# Patient Record
Sex: FEMALE | Race: BLACK OR AFRICAN AMERICAN | HISPANIC OR LATINO | Employment: FULL TIME | URBAN - METROPOLITAN AREA
[De-identification: names, ages, dates, MRNs, and addresses within clinical notes are randomized per-mention and may not be internally consistent; named-entity substitution may affect disease eponyms.]

---

## 2017-05-19 ENCOUNTER — OFFICE VISIT (OUTPATIENT)
Dept: URGENT CARE | Facility: URGENT CARE | Age: 37
End: 2017-05-19
Payer: COMMERCIAL

## 2017-05-19 VITALS
TEMPERATURE: 98.7 F | SYSTOLIC BLOOD PRESSURE: 130 MMHG | WEIGHT: 275 LBS | BODY MASS INDEX: 45.07 KG/M2 | DIASTOLIC BLOOD PRESSURE: 80 MMHG | HEART RATE: 73 BPM

## 2017-05-19 DIAGNOSIS — L21.0 DANDRUFF, ADULT: Primary | ICD-10-CM

## 2017-05-19 PROCEDURE — 99203 OFFICE O/P NEW LOW 30 MIN: CPT | Performed by: NURSE PRACTITIONER

## 2017-05-19 RX ORDER — LABETALOL 200 MG/1
TABLET, FILM COATED ORAL
Refills: 3 | COMMUNITY
Start: 2017-04-02 | End: 2018-02-08

## 2017-05-19 NOTE — MR AVS SNAPSHOT
"              After Visit Summary   2017    Tiffany Sanz    MRN: [id number]           Patient Information     Date Of Birth          1980        Visit Information        Provider Department      2017 12:45 PM Kate Mcneil NP [facility]        Today's Diagnoses     Dandruff, adult    -  1       Follow-ups after your visit        Follow-up notes from your care team     Return if symptoms worsen or fail to improve.      Who to contact     If you have questions or need follow up information about today's clinic visit or your schedule please contact [facility] directly at [phone].  Normal or non-critical lab and imaging results will be communicated to you by Techcafe.iohart, letter or phone within 4 business days after the clinic has received the results. If you do not hear from us within 7 days, please contact the clinic through Techcafe.iohart or phone. If you have a critical or abnormal lab result, we will notify you by phone as soon as possible.  Submit refill requests through oboxo or call your pharmacy and they will forward the refill request to us. Please allow 3 business days for your refill to be completed.          Additional Information About Your Visit        MyChart Information     oboxo lets you send messages to your doctor, view your test results, renew your prescriptions, schedule appointments and more. To sign up, go to [email or web address] . Click on \"Log in\" on the left side of the screen, which will take you to the Welcome page. Then click on \"Sign up Now\" on the right side of the page.     You will be asked to enter the access code listed below, as well as some personal information. Please follow the directions to create your username and password.     Your access code is: [email or web address]  Expires: 2017  1:54 PM     Your access code will  in 90 days. If you need help or a new code, please call your [facility] or [phone].      "   Care EveryWhere ID     This is your Care EveryWhere ID. This could be used by other organizations to access your [place] medical records  [id number]        Your Vitals Were     Pulse Temperature Breastfeeding? BMI (Body Mass Index)          73 98.7  F (37.1  C) (Oral) Yes 45.07 kg/m2         Blood Pressure from Last 3 Encounters:   05/19/17 130/80   05/11/10 120/70   03/03/10 122/60    Weight from Last 3 Encounters:   05/19/17 275 lb (124.7 kg)   05/11/10 224 lb (101.6 kg)   03/03/10 216 lb (98 kg)              Today, you had the following     No orders found for display         Today's Medication Changes          These changes are accurate as of: 5/19/17  1:54 PM.  If you have any questions, ask your nurse or doctor.               Start taking these medicines.        Dose/Directions    pyrithione zinc 1 % Sham   Commonly known as:  AVEENO NOURISH+DANDRUFF CONT   Used for:  Dandruff, adult   Started by:  Kate Mcneil NP        Dose:  1 applicator   Apply 1 mL topically daily as needed for irritation   Quantity:  207 mL   Refills:  0            Where to get your medicines      These medications were sent to [facility]/pharmacy #[id number] - [place], [place] - [address]  [address] [address]     Phone:  [phone]     pyrithione zinc 1 % Sham                Primary Care Provider    None Specified       No primary provider on file.        Thank you!     Thank you for choosing [facility]  for your care. Our goal is always to provide you with excellent care. Hearing back from our patients is one way we can continue to improve our services. Please take a few minutes to complete the written survey that you may receive in the mail after your visit with us. Thank you!             Your Updated Medication List - Protect others around you: Learn how to safely use, store and throw away your medicines at www.disposemymeds.org.          This list is accurate as of: 5/19/17  1:54 PM.   Always use your most recent med list.                   Brand Name Dispense Instructions for use    labetalol 200 MG tablet    NORMODYNE     TAKE 1 TABLET BY MOUTH TWICE A DAY       pyrithione zinc 1 % Sham    AVEENO NOURISH+DANDRUFF CONT    207 mL    Apply 1 mL topically daily as needed for irritation

## 2017-05-19 NOTE — PROGRESS NOTES
SUBJECTIVE:                                                    Tiffany Sanz is a 36 year old female who presents to clinic today for the following health issues:      Rash      Duration: 3 days    Description  Location: scalp   Itching: mild    Intensity:  moderate    Accompanying signs and symptoms: None    History (similar episodes/previous evaluation): None    Precipitating or alleviating factors:  New exposures:  None  Recent travel: was in [place] recently      Therapies tried and outcome: none            Allergies   Allergen Reactions     Nkda [No Known Drug Allergies]        Past Medical History:   Diagnosis Date     NO ACTIVE PROBLEMS          No current outpatient prescriptions on file prior to visit.  No current facility-administered medications on file prior to visit.     Social History   Substance Use Topics     Smoking status: Never Smoker     Smokeless tobacco: Not on file      Comment: Roommate smokes     Alcohol use Yes       ROS:  General: negative for fever  SKIN: + as above      Physcial Exam:  /80 (BP Location: Left arm, Patient Position: Chair, Cuff Size: Adult Large)  Pulse 73  Temp 98.7  F (37.1  C) (Oral)  Wt 275 lb (124.7 kg)  Breastfeeding? Yes  BMI 45.07 kg/m2    GENERAL: alert, no acute distress  EYES: conjunctival clear  RESP: Regular breathing rate  NEURO: awake .  SKIN: patchy 3 rounded scaly areas on the scalp size of a dime, with particles of dandruff.    ASSESSMENT:    ICD-10-CM    1. Dandruff, adult L21.0 pyrithione zinc (AVEENO NOURISH+DANDRUFF CONT) 1 % SHAM       PLAN:  See today's orders.  Follow-up with primary clinic if not improving.  Advised about symptoms which might herald more serious problems.    Kate Mcneil  FNP-BC  Family Nurse Practitoner

## 2017-05-19 NOTE — NURSING NOTE
"Chief Complaint   Patient presents with     Derm Problem     Pt c/o dry skin concern.       Initial /80 (BP Location: Left arm, Patient Position: Chair, Cuff Size: Adult Large)  Pulse 73  Temp 98.7  F (37.1  C) (Oral)  Wt 275 lb (124.7 kg)  Breastfeeding? Yes  BMI 45.07 kg/m2 Estimated body mass index is 45.07 kg/(m^2) as calculated from the following:    Height as of 3/3/10: 5' 5.5\" (1.664 m).    Weight as of this encounter: 275 lb (124.7 kg).  Medication Reconciliation: complete     Ofelia Kaiser CMA (AAMA)      "

## 2017-06-07 ENCOUNTER — OFFICE VISIT - HEALTHEAST (OUTPATIENT)
Dept: FAMILY MEDICINE | Facility: CLINIC | Age: 37
End: 2017-06-07

## 2017-06-07 DIAGNOSIS — R87.610 ATYPICAL SQUAMOUS CELLS OF UNDETERMINED SIGNIFICANCE (ASCUS) ON PAPANICOLAOU SMEAR OF CERVIX: ICD-10-CM

## 2017-06-07 DIAGNOSIS — Z30.432 ENCOUNTER FOR IUD REMOVAL: ICD-10-CM

## 2017-06-07 LAB
HPV ABSTRACT: NORMAL
PAP-ABSTRACT: NORMAL

## 2017-06-07 ASSESSMENT — MIFFLIN-ST. JEOR: SCORE: 1936.62

## 2017-06-10 LAB
HPV INTERPRETATION - HISTORICAL: NORMAL
HPV INTERPRETER - HISTORICAL: NORMAL

## 2017-06-14 LAB
BKR LAB AP ABNORMAL BLEEDING: NO
BKR LAB AP BIRTH CONTROL/HORMONES: NORMAL
BKR LAB AP CERVICAL APPEARANCE: NORMAL
BKR LAB AP GYN ADEQUACY: NORMAL
BKR LAB AP GYN INTERPRETATION: NORMAL
BKR LAB AP GYN OTHER FINDINGS: NORMAL
BKR LAB AP HPV REFLEX: NORMAL
BKR LAB AP LMP: NORMAL
BKR LAB AP PATIENT STATUS: NORMAL
BKR LAB AP PREVIOUS ABNORMAL: NORMAL
BKR LAB AP PREVIOUS NORMAL: NORMAL
HIGH RISK?: NO
PATH REPORT.COMMENTS IMP SPEC: NORMAL
RESULT FLAG (HE HISTORICAL CONVERSION): NORMAL

## 2017-06-15 ENCOUNTER — COMMUNICATION - HEALTHEAST (OUTPATIENT)
Dept: HEALTH INFORMATION MANAGEMENT | Facility: CLINIC | Age: 37
End: 2017-06-15

## 2017-06-16 ENCOUNTER — COMMUNICATION - HEALTHEAST (OUTPATIENT)
Dept: FAMILY MEDICINE | Facility: CLINIC | Age: 37
End: 2017-06-16

## 2017-06-19 ENCOUNTER — OFFICE VISIT - HEALTHEAST (OUTPATIENT)
Dept: FAMILY MEDICINE | Facility: CLINIC | Age: 37
End: 2017-06-19

## 2017-06-19 DIAGNOSIS — R35.0 URINARY FREQUENCY: ICD-10-CM

## 2017-06-19 DIAGNOSIS — Z86.32 HISTORY OF GESTATIONAL DIABETES: ICD-10-CM

## 2017-06-19 DIAGNOSIS — E66.9 OBESITY, UNSPECIFIED: ICD-10-CM

## 2017-06-19 LAB — HBA1C MFR BLD: 5.7 % (ref 3.5–6)

## 2017-06-19 ASSESSMENT — MIFFLIN-ST. JEOR: SCORE: 1923.01

## 2017-07-12 ENCOUNTER — OFFICE VISIT - HEALTHEAST (OUTPATIENT)
Dept: FAMILY MEDICINE | Facility: CLINIC | Age: 37
End: 2017-07-12

## 2017-07-12 DIAGNOSIS — E66.9 OBESITY, UNSPECIFIED: ICD-10-CM

## 2017-07-12 DIAGNOSIS — M25.552 HIP PAIN, ACUTE, LEFT: ICD-10-CM

## 2017-07-12 DIAGNOSIS — N91.2 AMENORRHEA: ICD-10-CM

## 2017-07-12 DIAGNOSIS — M70.62 TROCHANTERIC BURSITIS OF LEFT HIP: ICD-10-CM

## 2017-07-12 ASSESSMENT — MIFFLIN-ST. JEOR: SCORE: 1947.5

## 2017-07-13 ENCOUNTER — COMMUNICATION - HEALTHEAST (OUTPATIENT)
Dept: SURGERY | Facility: CLINIC | Age: 37
End: 2017-07-13

## 2017-07-17 ENCOUNTER — COMMUNICATION - HEALTHEAST (OUTPATIENT)
Dept: SURGERY | Facility: CLINIC | Age: 37
End: 2017-07-17

## 2017-08-31 ENCOUNTER — AMBULATORY - HEALTHEAST (OUTPATIENT)
Dept: SURGERY | Facility: CLINIC | Age: 37
End: 2017-08-31

## 2017-08-31 ENCOUNTER — OFFICE VISIT - HEALTHEAST (OUTPATIENT)
Dept: SURGERY | Facility: CLINIC | Age: 37
End: 2017-08-31

## 2017-08-31 DIAGNOSIS — R06.83 SNORING: ICD-10-CM

## 2017-08-31 DIAGNOSIS — R60.0 LOWER LEG EDEMA: ICD-10-CM

## 2017-08-31 DIAGNOSIS — R20.2 PARESTHESIA OF BOTH FEET: ICD-10-CM

## 2017-08-31 DIAGNOSIS — N92.0 MENORRHAGIA: ICD-10-CM

## 2017-08-31 DIAGNOSIS — E66.01 MORBID OBESITY WITH BMI OF 40.0-44.9, ADULT (H): ICD-10-CM

## 2017-08-31 DIAGNOSIS — D64.9 ANEMIA: ICD-10-CM

## 2017-08-31 DIAGNOSIS — E78.5 DYSLIPIDEMIA: ICD-10-CM

## 2017-08-31 ASSESSMENT — MIFFLIN-ST. JEOR: SCORE: 1923.01

## 2017-09-05 ENCOUNTER — AMBULATORY - HEALTHEAST (OUTPATIENT)
Dept: LAB | Facility: CLINIC | Age: 37
End: 2017-09-05

## 2017-09-05 DIAGNOSIS — E66.01 MORBID OBESITY WITH BMI OF 40.0-44.9, ADULT (H): ICD-10-CM

## 2017-09-05 DIAGNOSIS — N92.0 MENORRHAGIA: ICD-10-CM

## 2017-09-05 DIAGNOSIS — R20.2 PARESTHESIA OF BOTH FEET: ICD-10-CM

## 2017-09-08 ENCOUNTER — COMMUNICATION - HEALTHEAST (OUTPATIENT)
Dept: SURGERY | Facility: CLINIC | Age: 37
End: 2017-09-08

## 2017-10-03 ENCOUNTER — OFFICE VISIT - HEALTHEAST (OUTPATIENT)
Dept: FAMILY MEDICINE | Facility: CLINIC | Age: 37
End: 2017-10-03

## 2017-10-03 DIAGNOSIS — Z11.3 SCREEN FOR STD (SEXUALLY TRANSMITTED DISEASE): ICD-10-CM

## 2017-10-03 DIAGNOSIS — N89.8 VAGINAL DISCHARGE: ICD-10-CM

## 2017-10-03 DIAGNOSIS — B96.89 BACTERIAL VAGINOSIS: ICD-10-CM

## 2017-10-03 DIAGNOSIS — N76.0 BACTERIAL VAGINOSIS: ICD-10-CM

## 2017-10-03 LAB — HIV 1+2 AB+HIV1 P24 AG SERPL QL IA: NEGATIVE

## 2017-10-03 ASSESSMENT — MIFFLIN-ST. JEOR: SCORE: 1852.7

## 2017-10-04 LAB — SYPHILIS RPR SCREEN - HISTORICAL: NORMAL

## 2017-10-31 ENCOUNTER — COMMUNICATION - HEALTHEAST (OUTPATIENT)
Dept: SURGERY | Facility: CLINIC | Age: 37
End: 2017-10-31

## 2017-11-28 ENCOUNTER — OFFICE VISIT - HEALTHEAST (OUTPATIENT)
Dept: FAMILY MEDICINE | Facility: CLINIC | Age: 37
End: 2017-11-28

## 2017-11-28 DIAGNOSIS — Z11.3 SCREEN FOR STD (SEXUALLY TRANSMITTED DISEASE): ICD-10-CM

## 2017-11-28 DIAGNOSIS — E78.5 DYSLIPIDEMIA: ICD-10-CM

## 2017-11-28 DIAGNOSIS — Z00.00 WELL ADULT EXAM: ICD-10-CM

## 2017-11-28 DIAGNOSIS — R11.0 NAUSEA: ICD-10-CM

## 2017-11-28 DIAGNOSIS — D64.9 ANEMIA: ICD-10-CM

## 2017-11-28 DIAGNOSIS — N89.8 VAGINAL DISCHARGE: ICD-10-CM

## 2017-11-28 DIAGNOSIS — E66.01 MORBID OBESITY WITH BMI OF 40.0-44.9, ADULT (H): ICD-10-CM

## 2017-11-28 DIAGNOSIS — L65.9 HAIR LOSS: ICD-10-CM

## 2017-11-28 DIAGNOSIS — R73.03 PREDIABETES: ICD-10-CM

## 2017-11-28 DIAGNOSIS — R35.0 URINARY FREQUENCY: ICD-10-CM

## 2017-11-28 LAB
CHOLEST SERPL-MCNC: 217 MG/DL
FASTING STATUS PATIENT QL REPORTED: NO
HBA1C MFR BLD: 5.6 % (ref 3.5–6)
HDLC SERPL-MCNC: 53 MG/DL
HIV 1+2 AB+HIV1 P24 AG SERPL QL IA: NEGATIVE
LDLC SERPL CALC-MCNC: 141 MG/DL
TRIGL SERPL-MCNC: 116 MG/DL

## 2017-11-28 ASSESSMENT — MIFFLIN-ST. JEOR: SCORE: 1911.61

## 2017-11-29 LAB — SYPHILIS RPR SCREEN - HISTORICAL: NORMAL

## 2018-01-02 ENCOUNTER — OFFICE VISIT - HEALTHEAST (OUTPATIENT)
Dept: FAMILY MEDICINE | Facility: CLINIC | Age: 38
End: 2018-01-02

## 2018-01-02 DIAGNOSIS — R11.0 NAUSEA: ICD-10-CM

## 2018-01-02 DIAGNOSIS — L65.9 HAIR LOSS: ICD-10-CM

## 2018-01-02 DIAGNOSIS — D64.9 ANEMIA: ICD-10-CM

## 2018-01-02 DIAGNOSIS — R19.8 ANAL DISCHARGE: ICD-10-CM

## 2018-01-02 DIAGNOSIS — E66.01 MORBID OBESITY WITH BMI OF 40.0-44.9, ADULT (H): ICD-10-CM

## 2018-01-02 ASSESSMENT — MIFFLIN-ST. JEOR: SCORE: 1929.47

## 2018-01-05 LAB
MISCELLANEOUS TEST DEPT. - HE HISTORICAL: NORMAL
PERFORMING LAB: NORMAL
SPECIMEN STATUS: NORMAL
TEST NAME: NORMAL

## 2018-02-01 ENCOUNTER — OFFICE VISIT - HEALTHEAST (OUTPATIENT)
Dept: SURGERY | Facility: CLINIC | Age: 38
End: 2018-02-01

## 2018-02-01 DIAGNOSIS — D50.9 IRON DEFICIENCY ANEMIA: ICD-10-CM

## 2018-02-01 DIAGNOSIS — E66.01 MORBID OBESITY WITH BMI OF 40.0-44.9, ADULT (H): ICD-10-CM

## 2018-02-01 DIAGNOSIS — N92.4 EXCESSIVE BLEEDING IN PREMENOPAUSAL PERIOD: ICD-10-CM

## 2018-02-01 ASSESSMENT — MIFFLIN-ST. JEOR: SCORE: 1937.75

## 2018-02-08 ENCOUNTER — OFFICE VISIT (OUTPATIENT)
Dept: URGENT CARE | Facility: URGENT CARE | Age: 38
End: 2018-02-08
Payer: COMMERCIAL

## 2018-02-08 VITALS
TEMPERATURE: 98.8 F | DIASTOLIC BLOOD PRESSURE: 86 MMHG | BODY MASS INDEX: 44.57 KG/M2 | WEIGHT: 272 LBS | HEART RATE: 83 BPM | SYSTOLIC BLOOD PRESSURE: 131 MMHG | OXYGEN SATURATION: 100 %

## 2018-02-08 DIAGNOSIS — R07.0 THROAT PAIN: ICD-10-CM

## 2018-02-08 DIAGNOSIS — J06.9 VIRAL URI WITH COUGH: Primary | ICD-10-CM

## 2018-02-08 LAB
DEPRECATED S PYO AG THROAT QL EIA: NORMAL
SPECIMEN SOURCE: NORMAL

## 2018-02-08 PROCEDURE — 99213 OFFICE O/P EST LOW 20 MIN: CPT | Performed by: PHYSICIAN ASSISTANT

## 2018-02-08 PROCEDURE — 87880 STREP A ASSAY W/OPTIC: CPT | Performed by: PHYSICIAN ASSISTANT

## 2018-02-08 PROCEDURE — 87081 CULTURE SCREEN ONLY: CPT | Performed by: PHYSICIAN ASSISTANT

## 2018-02-08 RX ORDER — METRONIDAZOLE 500 MG/1
500 TABLET ORAL
COMMUNITY
Start: 2018-01-25 | End: 2019-02-26

## 2018-02-08 ASSESSMENT — ENCOUNTER SYMPTOMS
PSYCHIATRIC NEGATIVE: 1
CARDIOVASCULAR NEGATIVE: 1
EYES NEGATIVE: 1
NEUROLOGICAL NEGATIVE: 1
GASTROINTESTINAL NEGATIVE: 1
MUSCULOSKELETAL NEGATIVE: 1

## 2018-02-08 NOTE — MR AVS SNAPSHOT
"              After Visit Summary   2018    Tiffany Sanz    MRN: [id number]           Patient Information     Date Of Birth          1980        Visit Information        Provider Department      2018 1:45 PM Berna Hendricks PA-C [facility]        Today's Diagnoses     Viral URI with cough    -  1    Throat pain           Follow-ups after your visit        Who to contact     If you have questions or need follow up information about today's clinic visit or your schedule please contact [facility] directly at [phone].  Normal or non-critical lab and imaging results will be communicated to you by PayrollHerohart, letter or phone within 4 business days after the clinic has received the results. If you do not hear from us within 7 days, please contact the clinic through PayrollHerohart or phone. If you have a critical or abnormal lab result, we will notify you by phone as soon as possible.  Submit refill requests through Wallop or call your pharmacy and they will forward the refill request to us. Please allow 3 business days for your refill to be completed.          Additional Information About Your Visit        MyChart Information     Wallop lets you send messages to your doctor, view your test results, renew your prescriptions, schedule appointments and more. To sign up, go to [email or web address] . Click on \"Log in\" on the left side of the screen, which will take you to the Welcome page. Then click on \"Sign up Now\" on the right side of the page.     You will be asked to enter the access code listed below, as well as some personal information. Please follow the directions to create your username and password.     Your access code is: [email or web address]  Expires: 2018  3:56 PM     Your access code will  in 90 days. If you need help or a new code, please call your [facility] or [phone].        Care EveryWhere ID     This is your Care EveryWhere ID. " This could be used by other organizations to access your [place] medical records  [id number]        Your Vitals Were     Pulse Temperature Pulse Oximetry Breastfeeding? BMI (Body Mass Index)       83 98.8  F (37.1  C) (Oral) 100% No 44.57 kg/m2        Blood Pressure from Last 3 Encounters:   02/08/18 131/86   05/19/17 130/80   05/11/10 120/70    Weight from Last 3 Encounters:   02/08/18 272 lb (123.4 kg)   05/19/17 275 lb (124.7 kg)   05/11/10 224 lb (101.6 kg)              We Performed the Following     Beta strep group A culture     Rapid strep screen        Primary Care Provider Fax #    Physician No Ref-Primary [phone]       No address on file        Equal Access to Services     YUNG ARREOLA : Vignesh Trammell, waaxda luqadaha, qaybta kaalmada valentinoyajulio, talya ching . So [facility] [phone].    ATENCIÓN: Si habla español, tiene a duke disposición servicios gratuitos de asistencia lingüística. Llame al [phone].    We comply with applicable federal civil rights laws and [place] laws. We do not discriminate on the basis of race, color, national origin, age, disability, sex, sexual orientation, or gender identity.            Thank you!     [facility] Our goal is always to provide you with excellent care. Hearing back from our patients is one way we can continue to improve our services. Please take a few minutes to complete the written survey that you may receive in the mail after your visit with us. Thank you!             Your Updated Medication List - Protect others around you: Learn how to safely use, store and throw away your medicines at www.disposemymeds.org.          This list is accurate as of 2/8/18  3:56 PM.  Always use your most recent med list.                   Brand Name Dispense Instructions for use Diagnosis    IRON SUPPLEMENT PO           metroNIDAZOLE 500 MG tablet    FLAGYL     Take 500  mg by mouth        PRENATAL MULTIVIT-IRON PO

## 2018-02-08 NOTE — PROGRESS NOTES
SUBJECTIVE:   Tiffany Sanz is a 37 year old female presenting with a chief complaint of   Chief Complaint   Patient presents with     Throat Pain     Pt c/o throat pain and cough since yesterday.    .    Onset of symptoms was 1 day(s) ago.  Course of illness is worsening.    Severity moderate  Current and Associated symptoms: chills, throat pain, body aches, cough   Treatment measures tried include Fluids and Rest, cough drops.  Predisposing factors include None.    This started feeling ill two days ago  She had a sore throat, body aches, chills, not sure about a fever  Cough - productive with yellow sputum and throat hurts with coughing   No shortness of breath or wheezing  No history of asthma, non smoker    Review of Systems   Constitutional:        As in HPI   HENT:        As in HPI   Eyes: Negative.    Respiratory:        As in HPI   Cardiovascular: Negative.    Gastrointestinal: Negative.    Genitourinary: Negative.    Musculoskeletal: Negative.    Skin: Negative.    Neurological: Negative.    Psychiatric/Behavioral: Negative.          Past Medical History:   Diagnosis Date     NO ACTIVE PROBLEMS      Current Outpatient Prescriptions   Medication Sig Dispense Refill     metroNIDAZOLE (FLAGYL) 500 MG tablet Take 500 mg by mouth       Prenatal Vit-Fe Sulfate-FA (PRENATAL MULTIVIT-IRON PO)        Ferrous Sulfate (IRON SUPPLEMENT PO)        Social History   Substance Use Topics     Smoking status: Never Smoker     Smokeless tobacco: Never Used      Comment: Roommate smokes     Alcohol use Yes       OBJECTIVE  /86 (BP Location: Left arm, Patient Position: Chair, Cuff Size: Adult Large)  Pulse 83  Temp 98.8  F (37.1  C) (Oral)  Wt 272 lb (123.4 kg)  SpO2 100%  Breastfeeding? No  BMI 44.57 kg/m2    Physical Exam   Constitutional: She is oriented to person, place, and time and well-developed, well-nourished, and in no distress.   HENT:   Head: Normocephalic and atraumatic.   Right Ear: Tympanic membrane  and ear canal normal.   Left Ear: Tympanic membrane and ear canal normal.   Nose: Nose normal.   Mouth/Throat: Uvula is midline and mucous membranes are normal. Oropharyngeal exudate, posterior oropharyngeal edema and posterior oropharyngeal erythema present.   Eyes: Conjunctivae and EOM are normal. Pupils are equal, round, and reactive to light.   Neck: Normal range of motion. Neck supple.   Cardiovascular: Normal rate, regular rhythm and normal heart sounds.    Pulmonary/Chest: Effort normal and breath sounds normal.   Neurological: She is alert and oriented to person, place, and time. Gait normal.   Skin: Skin is warm and dry.   Psychiatric: Mood and affect normal.       Labs:  Results for orders placed or performed in visit on 02/08/18 (from the past 24 hour(s))   Rapid strep screen   Result Value Ref Range    Specimen Description Throat     Rapid Strep A Screen       NEGATIVE: No Group A streptococcal antigen detected by immunoassay, await culture report.           ASSESSMENT:      ICD-10-CM    1. Viral URI with cough J06.9     B97.89    2. Throat pain R07.0 Rapid strep screen     Beta strep group A culture        PLAN:    URI Adult:  Tylenol, Ibuprofen, Fluids and Rest    Followup:    If not improving or if condition worsens, follow up with your Primary Care Provider    There are no Patient Instructions on file for this visit.    Berna eHndricks PA-C

## 2018-02-08 NOTE — LETTER
.            [facility]  [address]  [facility] [address]  Phone: [phone]    02/10/18    Tiffany ROACH Yvon  [address] [place]   [facility] [address]      To whom it may concern:     The results of your recent lab results were normal. Enclosed are a copy of the results. Please call us with any questions/concerns regarding your results. Thank you for choosing [facility]. Have a great day!  Results for orders placed or performed in visit on 02/08/18   Rapid strep screen   Result Value Ref Range    Specimen Description Throat     Rapid Strep A Screen       NEGATIVE: No Group A streptococcal antigen detected by immunoassay, await culture report.   Beta strep group A culture   Result Value Ref Range    Specimen Description Throat     Culture Micro No beta hemolytic Streptococcus Group A isolated          Sincerely,      Berna Hendricks PA-C, PAOlvinC

## 2018-02-08 NOTE — NURSING NOTE
"Chief Complaint   Patient presents with     Throat Pain     Pt c/o throat pain and cough since yesterday.        Initial /86 (BP Location: Left arm, Patient Position: Chair, Cuff Size: Adult Large)  Pulse 83  Temp 98.8  F (37.1  C) (Oral)  Wt 272 lb (123.4 kg)  SpO2 100%  Breastfeeding? No  BMI 44.57 kg/m2 Estimated body mass index is 44.57 kg/(m^2) as calculated from the following:    Height as of 3/3/10: 5' 5.5\" (1.664 m).    Weight as of this encounter: 272 lb (123.4 kg).  Medication Reconciliation: complete     Ofelia Kaiser CMA (AAMA)      "

## 2018-02-09 LAB
BACTERIA SPEC CULT: NORMAL
SPECIMEN SOURCE: NORMAL

## 2018-02-21 ENCOUNTER — RECORDS - HEALTHEAST (OUTPATIENT)
Dept: ADMINISTRATIVE | Facility: OTHER | Age: 38
End: 2018-02-21

## 2018-03-02 ENCOUNTER — COMMUNICATION - HEALTHEAST (OUTPATIENT)
Dept: SURGERY | Facility: CLINIC | Age: 38
End: 2018-03-02

## 2018-08-19 ENCOUNTER — TRANSFERRED RECORDS (OUTPATIENT)
Dept: HEALTH INFORMATION MANAGEMENT | Facility: CLINIC | Age: 38
End: 2018-08-19

## 2018-08-19 LAB
C TRACH DNA SPEC QL PROBE+SIG AMP: NEGATIVE
N GONORRHOEA DNA SPEC QL PROBE+SIG AMP: NEGATIVE
SPECIMEN DESCRIP: NORMAL
SPECIMEN DESCRIPTION: NORMAL

## 2018-09-06 ENCOUNTER — OFFICE VISIT (OUTPATIENT)
Dept: FAMILY MEDICINE | Facility: CLINIC | Age: 38
End: 2018-09-06
Payer: COMMERCIAL

## 2018-09-06 VITALS
BODY MASS INDEX: 46.67 KG/M2 | RESPIRATION RATE: 18 BRPM | WEIGHT: 290.4 LBS | TEMPERATURE: 98.3 F | HEART RATE: 90 BPM | HEIGHT: 66 IN | OXYGEN SATURATION: 100 % | DIASTOLIC BLOOD PRESSURE: 82 MMHG | SYSTOLIC BLOOD PRESSURE: 140 MMHG

## 2018-09-06 DIAGNOSIS — E66.01 MORBID OBESITY (H): ICD-10-CM

## 2018-09-06 DIAGNOSIS — R21 RASH: Primary | ICD-10-CM

## 2018-09-06 PROCEDURE — 99213 OFFICE O/P EST LOW 20 MIN: CPT | Performed by: PHYSICIAN ASSISTANT

## 2018-09-06 RX ORDER — HYDROCORTISONE 2.5 %
CREAM (GRAM) TOPICAL
Qty: 30 G | Refills: 0 | Status: SHIPPED | OUTPATIENT
Start: 2018-09-06 | End: 2019-07-02

## 2018-09-06 ASSESSMENT — PAIN SCALES - GENERAL: PAINLEVEL: NO PAIN (0)

## 2018-09-06 NOTE — MR AVS SNAPSHOT
After Visit Summary   9/6/2018    Tiffany Sanz    MRN: [id number]           Patient Information     Date Of Birth          1980        Visit Information        Provider Department      9/6/2018 11:20 AM Fernandez Bennett PA [facility]        Today's Diagnoses     Rash    -  1    Morbid obesity (H)          Care Instructions        Dermatitis (Non-Specific)  Dermatitis is an inflammation of the skin. The exact cause of your rash is not certain. However, this rash does not appear to be an infection or contagious illness. Taking care of the rash at home should help relieve your symptoms.  Home Care:    Keep the areas of rash clean by washing it daily. This also helps to keep the skin moist.    Use a neutral pH soap such as Dove or Lever 2000.    Apply a moisturizing lotion after bathing to prevent dry skin.     Avoid skin irritants (wool or silk clothing, grease, oils, some medicines, harsh soaps, and detergents). Wear absorbent, soft fabrics next to the skin rather than rough or scratchy materials.    Unless another medicine was prescribed, you may use Hydrocortisone cream (which you can get without a prescription) to reduce the inflammation.  Follow Up:  Make an appointment with your doctor in the next 1 to 2 weeks if your symptoms do not improve with the above measures.  Get Prompt Medical Attention  if any of the following occur:    Increasing area of redness or pain in the skin    Yellow crusts or drainage from the rash    Joint pain    New rash that appears in other areas of the body    Fever of 100.4 F (38 C) or higher, or as directed by your healthcare provider    [id number] [address], [place] [address]. All rights reserved. This information is not intended as a substitute for professional medical care. Always follow your healthcare professional's instructions.                  Follow-ups after your visit        Follow-up notes from  "your care team     Return if symptoms worsen or fail to improve.      Your next 10 appointments already scheduled     Sep 06, 2018 11:20 AM CDT   [facility]    [address] [place] [id number] [phone]           Bring a current list of meds and any records pertaining to this visit. For Physicals, please bring immunization records and any forms needing to be filled out. Please arrive 10 minutes early to complete paperwork.              Who to contact     If you have questions or need follow up information about today's clinic visit or your schedule please contact [facility] directly at [phone].  Normal or non-critical lab and imaging results will be communicated to you by MyChart, letter or phone within 4 business days after the clinic has received the results. If you do not hear from us within 7 days, please contact the clinic through MyChart or phone. If you have a critical or abnormal lab result, we will notify you by phone as soon as possible.  Submit refill requests through Digital Marketing Solutions or call your pharmacy and they will forward the refill request to us. Please allow 3 business days for your refill to be completed.          Additional Information About Your Visit        Digital Marketing Solutions Information     Digital Marketing Solutions lets you send messages to your doctor, view your test results, renew your prescriptions, schedule appointments and more. To sign up, go to [email or web address] . Click on \"Log in\" on the left side of the screen, which will take you to the Welcome page. Then click on \"Sign up Now\" on the right side of the page.     You will be asked to enter the access code listed below, as well as some personal information. Please follow the directions to create your username and password.     Your access code is: [email or web address]  Expires: 12/5/2018 10:53 AM     Your access code will " " in 90 days. If you need help or a new code, please call your [place] clinic or [phone].        Care EveryWhere ID     This is your Care EveryWhere ID. This could be used by other organizations to access your [place] medical records  [id number]        Your Vitals Were     Pulse Temperature Respirations Height Last Period Pulse Oximetry    90 98.3  F (36.8  C) (Oral) 18 5' 5.5\" (1.664 m) 2018 (Approximate) 100%    BMI (Body Mass Index)                   47.59 kg/m2            Blood Pressure from Last 3 Encounters:   18 140/82   18 131/86   17 130/80    Weight from Last 3 Encounters:   18 290 lb 6.4 oz (131.7 kg)   18 272 lb (123.4 kg)   17 275 lb (124.7 kg)              Today, you had the following     No orders found for display         Today's Medication Changes          These changes are accurate as of 18 10:53 AM.  If you have any questions, ask your nurse or doctor.               Start taking these medicines.        Dose/Directions    hydrocortisone 2.5 % cream   Used for:  Rash   Started by:  Fernandez Bennett PA        Apply BID to affected region(s) for 7-10 days.   Quantity:  30 g   Refills:  0            Where to get your medicines      These medications were sent to [facility] [address] [place] [address]  [address] [place] [id number]     Phone:  [phone]     hydrocortisone 2.5 % cream                Primary Care Provider Fax #    Physician No Ref-Primary [phone]       No address on file        Equal Access to Services     YUNG ARREOLA : Vignesh Trammell, lyn diggs, hugo cabrera, talya harden. So [facility] [phone].    ATENCIÓN: Si habla español, tiene a duke disposición servicios gratuitos de asistencia lingüística. Llame al [phone].    We comply with applicable federal civil rights laws " and [place] laws. We do not discriminate on the basis of race, color, national origin, age, disability, sex, sexual orientation, or gender identity.            Thank you!     Thank you for choosing [facility]  for your care. Our goal is always to provide you with excellent care. Hearing back from our patients is one way we can continue to improve our services. Please take a few minutes to complete the written survey that you may receive in the mail after your visit with us. Thank you!             Your Updated Medication List - Protect others around you: Learn how to safely use, store and throw away your medicines at www.disposemymeds.org.          This list is accurate as of 9/6/18 10:53 AM.  Always use your most recent med list.                   Brand Name Dispense Instructions for use Diagnosis    hydrocortisone 2.5 % cream     30 g    Apply BID to affected region(s) for 7-10 days.    Rash       IRON SUPPLEMENT PO           metroNIDAZOLE 500 MG tablet    FLAGYL     Take 500 mg by mouth        PRENATAL MULTIVIT-IRON PO

## 2018-09-06 NOTE — PROGRESS NOTES
"  SUBJECTIVE:   Tiffany Sanz is a 38 year old female who presents to clinic today for the following health issues:      Rash  Onset: about 4 months ago    Description:   Location: Lt medial  foot  Character: Blisters  Itching (Pruritis): YES    Progression of Symptoms:  same    Accompanying Signs & Symptoms:  Fever: no   Body aches or joint pain: no   Sore throat symptoms: no   Recent cold symptoms: no     History:   Previous similar rash: no     Precipitating factors:   Exposure to similar rash: YES- poss   New exposures: None   Recent travel: no     Therapies Tried and outcome: None       Pap w/HPV  7/2016 [facility] everywhere was normal     weight increasing           Allergies   Allergen Reactions     Nkda [No Known Drug Allergies]      Sulfa Drugs Itching       Past Medical History:   Diagnosis Date     NO ACTIVE PROBLEMS          Current Outpatient Prescriptions on File Prior to Visit:  Ferrous Sulfate (IRON SUPPLEMENT PO)    metroNIDAZOLE (FLAGYL) 500 MG tablet Take 500 mg by mouth   Prenatal Vit-Fe Sulfate-FA (PRENATAL MULTIVIT-IRON PO)      No current facility-administered medications on file prior to visit.     Social History   Substance Use Topics     Smoking status: Never Smoker     Smokeless tobacco: Never Used      Comment: Roommate smokes     Alcohol use Yes       ROS:  General: negative for fever  SKIN: + as above  GYN- pap 2016 wnl    Physcial Exam:  /82  Pulse 90  Temp 98.3  F (36.8  C) (Oral)  Resp 18  Ht 5' 5.5\" (1.664 m)  Wt 290 lb 6.4 oz (131.7 kg)  LMP 08/29/2018 (Approximate)  SpO2 100%  BMI 47.59 kg/m2    GENERAL: alert, no acute distress  EYES: conjunctival clear  RESP: Regular breathing rate  NEURO: awake .  SKIN: medial left foot with some dry, slightly hyperpigmented patches    ASSESSMENT:    ICD-10-CM    1. Rash R21 hydrocortisone 2.5 % cream   2. Morbid obesity (H) E66.01        PLAN:  See today's orders.  Follow-up with primary clinic routinely for " BP recheck.    Advised about symptoms which might herald more serious problems.

## 2018-09-06 NOTE — PATIENT INSTRUCTIONS
Dermatitis (Non-Specific)  Dermatitis is an inflammation of the skin. The exact cause of your rash is not certain. However, this rash does not appear to be an infection or contagious illness. Taking care of the rash at home should help relieve your symptoms.  Home Care:    Keep the areas of rash clean by washing it daily. This also helps to keep the skin moist.    Use a neutral pH soap such as Dove or Lever 2000.    Apply a moisturizing lotion after bathing to prevent dry skin.     Avoid skin irritants (wool or silk clothing, grease, oils, some medicines, harsh soaps, and detergents). Wear absorbent, soft fabrics next to the skin rather than rough or scratchy materials.    Unless another medicine was prescribed, you may use Hydrocortisone cream (which you can get without a prescription) to reduce the inflammation.  Follow Up:  Make an appointment with your doctor in the next 1 to 2 weeks if your symptoms do not improve with the above measures.  Get Prompt Medical Attention  if any of the following occur:    Increasing area of redness or pain in the skin    Yellow crusts or drainage from the rash    Joint pain    New rash that appears in other areas of the body    Fever of 100.4 F (38 C) or higher, or as directed by your healthcare provider    [id number] [facility], [address] [place] [address], [place] [address]. All rights reserved. This information is not intended as a substitute for professional medical care. Always follow your healthcare professional's instructions.

## 2019-01-10 ENCOUNTER — NURSE TRIAGE (OUTPATIENT)
Dept: NURSING | Facility: CLINIC | Age: 39
End: 2019-01-10

## 2019-01-10 ENCOUNTER — OFFICE VISIT (OUTPATIENT)
Dept: URGENT CARE | Facility: URGENT CARE | Age: 39
End: 2019-01-10
Payer: COMMERCIAL

## 2019-01-10 VITALS
BODY MASS INDEX: 48.15 KG/M2 | HEART RATE: 76 BPM | TEMPERATURE: 98.6 F | SYSTOLIC BLOOD PRESSURE: 153 MMHG | OXYGEN SATURATION: 99 % | WEIGHT: 293 LBS | DIASTOLIC BLOOD PRESSURE: 94 MMHG

## 2019-01-10 DIAGNOSIS — N93.9 VAGINAL BLEEDING: ICD-10-CM

## 2019-01-10 DIAGNOSIS — N89.8 VAGINAL IRRITATION: ICD-10-CM

## 2019-01-10 DIAGNOSIS — D62 ANEMIA DUE TO BLOOD LOSS, ACUTE: Primary | ICD-10-CM

## 2019-01-10 DIAGNOSIS — R30.0 DYSURIA: ICD-10-CM

## 2019-01-10 DIAGNOSIS — R82.90 NONSPECIFIC FINDING ON EXAMINATION OF URINE: ICD-10-CM

## 2019-01-10 LAB
ALBUMIN UR-MCNC: 30 MG/DL
APPEARANCE UR: ABNORMAL
BACTERIA #/AREA URNS HPF: ABNORMAL /HPF
BETA HCG QUAL IFA URINE: NEGATIVE
BILIRUB UR QL STRIP: ABNORMAL
COLOR UR AUTO: ABNORMAL
GLUCOSE UR STRIP-MCNC: NEGATIVE MG/DL
HGB BLD-MCNC: 9.3 G/DL (ref 11.7–15.7)
HGB UR QL STRIP: ABNORMAL
KETONES UR STRIP-MCNC: ABNORMAL MG/DL
LEUKOCYTE ESTERASE UR QL STRIP: ABNORMAL
NITRATE UR QL: POSITIVE
PH UR STRIP: 6.5 PH (ref 5–7)
RBC #/AREA URNS AUTO: >100 /HPF
SOURCE: ABNORMAL
SP GR UR STRIP: >1.03 (ref 1–1.03)
SPECIMEN SOURCE: NORMAL
UROBILINOGEN UR STRIP-ACNC: 0.2 EU/DL (ref 0.2–1)
WBC #/AREA URNS AUTO: ABNORMAL /HPF
WET PREP SPEC: NORMAL

## 2019-01-10 PROCEDURE — 36415 COLL VENOUS BLD VENIPUNCTURE: CPT | Performed by: FAMILY MEDICINE

## 2019-01-10 PROCEDURE — 81001 URINALYSIS AUTO W/SCOPE: CPT | Performed by: FAMILY MEDICINE

## 2019-01-10 PROCEDURE — 87088 URINE BACTERIA CULTURE: CPT | Performed by: FAMILY MEDICINE

## 2019-01-10 PROCEDURE — 85018 HEMOGLOBIN: CPT | Performed by: FAMILY MEDICINE

## 2019-01-10 PROCEDURE — 87210 SMEAR WET MOUNT SALINE/INK: CPT | Performed by: FAMILY MEDICINE

## 2019-01-10 PROCEDURE — 87086 URINE CULTURE/COLONY COUNT: CPT | Performed by: FAMILY MEDICINE

## 2019-01-10 PROCEDURE — 84703 CHORIONIC GONADOTROPIN ASSAY: CPT | Performed by: FAMILY MEDICINE

## 2019-01-10 PROCEDURE — 99214 OFFICE O/P EST MOD 30 MIN: CPT | Performed by: FAMILY MEDICINE

## 2019-01-10 RX ORDER — FERROUS SULFATE 325(65) MG
325 TABLET ORAL
Qty: 90 TABLET | Refills: 1 | Status: SHIPPED | OUTPATIENT
Start: 2019-01-10 | End: 2019-02-26 | Stop reason: ALTCHOICE

## 2019-01-10 ASSESSMENT — ENCOUNTER SYMPTOMS
HEMATURIA: 0
COLOR CHANGE: 0
ARTHRALGIAS: 0
FLANK PAIN: 0
CHILLS: 0
HEADACHES: 1
NAUSEA: 0
SORE THROAT: 0
FEVER: 0
DIARRHEA: 0
COUGH: 0
VOMITING: 0
SHORTNESS OF BREATH: 0
DYSURIA: 1
RHINORRHEA: 0

## 2019-01-10 NOTE — TELEPHONE ENCOUNTER
Has fibroids. Bleeding is bad this week. Passed some large clots today. She wanted to know level of care. She declined a clinic appointment today.  I advised the ER as soon as possible.  She will get there.  She is calling from work.  She is wondering what can be done short of an hysterectomy.  Kassi Calvert RN-[facility]      Reason for Disposition    SEVERE vaginal bleeding (i.e., soaking 2 pads or tampons per hour and present 2 or more hours)    Additional Information    Negative: Shock suspected (e.g., cold/pale/clammy skin, too weak to stand, low BP, rapid pulse)    Negative: Difficult to awaken or acting confused  (e.g., disoriented, slurred speech)    Negative: Passed out (i.e., lost consciousness, collapsed and was not responding)    Negative: Sounds like a life-threatening emergency to the triager    Negative: Followed a genital area injury    Negative: Pregnant > 20 weeks  (5 months or more)    Negative: Pregnant < 20 weeks  (less than 5 months)    Negative: Bleeding occurring > 12 months after menopause    Negative: Bleeding from sexual abuse or rape    Negative: [1] Vaginal discharge is main symptom AND [2] small amount of blood    Negative: SEVERE abdominal pain    Negative: SEVERE dizziness (e.g., unable to stand, requires support to walk, feels like passing out now)    Protocols used: VAGINAL BLEEDING - ABNORMAL-ADULT-

## 2019-01-10 NOTE — PROGRESS NOTES
"SUBJECTIVE:   Tiffany Sanz is a 38 year old female presenting with a chief complaint of   Chief Complaint   Patient presents with     Headache     Patient complains of blood clots and headache.       She is an established patient of [facility].    GYN Complaint    Onset of symptoms was 6 day(s) ago with headache and heavy menstrual cycles. Noted to have been diagnosed with fibroids a \"couple months ago\" via ultrasound. Also noted \"think lining\". Noted the last 4 menstrual cycles seem prolonged and heavy.     Also noted irritation with urination.   Denies abdominal pain or pelvic. Denies dyspareunia.       First vaginal second was a  also had gestational diabetes at that time.  after water broke secondary to fetal position after \"water broke\"      Course of illness is same.    Severity moderate  Current and Associated symptoms: fibroids, hx of anemia for many years. \"not sure what the cause of the anemia\"  \"I do have iron pills from GYN\"  Treatment measures tried include:  None  Sexually active: yes, single partner engaged to be .   Predisposing factors: None  Hx of previous symptom: none  Hx of chlamydia in 2015. Screening test for STD at primary doctor was normal or negative a few months ago.   Denies any concerns for infidelity. Boyfriend does not have any objective symptoms.       Review of Systems   Constitutional: Negative for chills and fever.   HENT: Negative for congestion, ear pain, rhinorrhea and sore throat.    Respiratory: Negative for cough and shortness of breath.    Gastrointestinal: Negative for diarrhea, nausea and vomiting.   Genitourinary: Positive for dysuria, menstrual problem, vaginal bleeding and vaginal discharge. Negative for enuresis, flank pain and hematuria.   Musculoskeletal: Negative for arthralgias.   Skin: Negative for color change and rash.   Neurological: Positive for headaches.       Past Medical History:   Diagnosis Date     NO ACTIVE PROBLEMS      No " family history on file.  Current Outpatient Medications   Medication Sig Dispense Refill     Ferrous Sulfate (IRON SUPPLEMENT PO)        hydrocortisone 2.5 % cream Apply BID to affected region(s) for 7-10 days. 30 g 0     metroNIDAZOLE (FLAGYL) 500 MG tablet Take 500 mg by mouth       Prenatal Vit-Fe Sulfate-FA (PRENATAL MULTIVIT-IRON PO)        Social History     Tobacco Use     Smoking status: Never Smoker     Smokeless tobacco: Never Used     Tobacco comment: Roommate smokes   Substance Use Topics     Alcohol use: Yes       OBJECTIVE  BP (!) 153/94 (BP Location: Left arm, Patient Position: Chair, Cuff Size: Adult Regular)   Pulse 76   Temp 98.6  F (37  C) (Oral)   Wt 133.3 kg (293 lb 12.8 oz)   SpO2 99%   BMI 48.15 kg/m      Physical Exam   Constitutional: She appears well-developed and well-nourished. No distress.   HENT:   Head: Normocephalic and atraumatic.   Right Ear: Tympanic membrane and external ear normal.   Left Ear: Tympanic membrane and external ear normal.   Mouth/Throat: Oropharynx is clear and moist.   Eyes: EOM are normal. Pupils are equal, round, and reactive to light.   Neck: Normal range of motion. Neck supple.   Pulmonary/Chest: Effort normal and breath sounds normal. No respiratory distress.   Lymphadenopathy:     She has no cervical adenopathy.   Neurological: She is alert. No cranial nerve deficit.   Skin: Skin is warm and dry. She is not diaphoretic.   Psychiatric: She has a normal mood and affect.   Nursing note and vitals reviewed.       Results for orders placed or performed in visit on 01/10/19   Beta HCG Qual, Urine - [facility] ([id number])   Result Value Ref Range    Beta HCG Qual IFA Urine Negative NEG^Negative      UA reflex to Microscopic and Culture   Result Value Ref Range    Color Urine Red     Appearance Urine Cloudy     Glucose Urine Negative NEG^Negative mg/dL    Bilirubin Urine Small (A) NEG^Negative    Ketones Urine Trace (A) NEG^Negative mg/dL    Specific  Gravity Urine >1.030 1.003 - 1.035    Blood Urine Large (A) NEG^Negative    pH Urine 6.5 5.0 - 7.0 pH    Protein Albumin Urine 30 (A) NEG^Negative mg/dL    Urobilinogen Urine 0.2 0.2 - 1.0 EU/dL    Nitrite Urine Positive (A) NEG^Negative    Leukocyte Esterase Urine Trace (A) NEG^Negative    Source Midstream Urine    Wet prep   Result Value Ref Range    Specimen Description Vagina     Wet Prep No Trichomonas seen     Wet Prep No clue cells seen     Wet Prep No yeast seen             ASSESSMENT:    ICD-10-CM    1. Anemia due to blood loss, acute D62 ferrous sulfate (IRON SUPPLEMENT) 325 (65 Fe) MG tablet   2. Dysuria R30.0 UA reflex to Microscopic and Culture     Urine Microscopic   3. Vaginal bleeding N93.9 Beta HCG Qual, Urine - [facility] ([id number])     Hemoglobin     ferrous sulfate (IRON SUPPLEMENT) 325 (65 Fe) MG tablet   4. Vaginal irritation N89.8 Wet prep   5. Nonspecific finding on examination of urine R82.90 Urine Culture Aerobic Bacterial          Medical Decision Making:  Close follow-up with GYN regarding ongoing fibroids and further tx options.       PLAN:  Proper use of iron as above was emphasized.   Patient educational/instructional material provided including reasons for follow-up    The patient indicates understanding of these issues and agrees with the plan.  Alex Andujar MD

## 2019-01-12 LAB
BACTERIA SPEC CULT: ABNORMAL
BACTERIA SPEC CULT: ABNORMAL
SPECIMEN SOURCE: ABNORMAL

## 2019-01-28 ENCOUNTER — OFFICE VISIT (OUTPATIENT)
Dept: FAMILY MEDICINE | Facility: CLINIC | Age: 39
End: 2019-01-28
Payer: COMMERCIAL

## 2019-01-28 VITALS
TEMPERATURE: 99.2 F | SYSTOLIC BLOOD PRESSURE: 138 MMHG | BODY MASS INDEX: 47.09 KG/M2 | RESPIRATION RATE: 18 BRPM | HEART RATE: 84 BPM | WEIGHT: 293 LBS | HEIGHT: 66 IN | OXYGEN SATURATION: 99 % | DIASTOLIC BLOOD PRESSURE: 89 MMHG

## 2019-01-28 DIAGNOSIS — L21.9 SEBORRHEIC DERMATITIS: ICD-10-CM

## 2019-01-28 DIAGNOSIS — Z11.3 SCREEN FOR STD (SEXUALLY TRANSMITTED DISEASE): ICD-10-CM

## 2019-01-28 DIAGNOSIS — K12.0 CANKER SORES ORAL: ICD-10-CM

## 2019-01-28 DIAGNOSIS — N30.00 ACUTE CYSTITIS WITHOUT HEMATURIA: Primary | ICD-10-CM

## 2019-01-28 DIAGNOSIS — B00.9 HERPES SIMPLEX VIRUS INFECTION: ICD-10-CM

## 2019-01-28 LAB
ALBUMIN UR-MCNC: NEGATIVE MG/DL
APPEARANCE UR: ABNORMAL
BACTERIA #/AREA URNS HPF: ABNORMAL /HPF
BILIRUB UR QL STRIP: NEGATIVE
COLOR UR AUTO: YELLOW
DEPRECATED S PYO AG THROAT QL EIA: NORMAL
GLUCOSE UR STRIP-MCNC: NEGATIVE MG/DL
HGB UR QL STRIP: NEGATIVE
KETONES UR STRIP-MCNC: ABNORMAL MG/DL
LEUKOCYTE ESTERASE UR QL STRIP: ABNORMAL
NITRATE UR QL: NEGATIVE
NON-SQ EPI CELLS #/AREA URNS LPF: ABNORMAL /LPF
PH UR STRIP: 6.5 PH (ref 5–7)
RBC #/AREA URNS AUTO: ABNORMAL /HPF
SOURCE: ABNORMAL
SP GR UR STRIP: 1.01 (ref 1–1.03)
SPECIMEN SOURCE: NORMAL
UROBILINOGEN UR STRIP-ACNC: 0.2 EU/DL (ref 0.2–1)
WBC #/AREA URNS AUTO: ABNORMAL /HPF

## 2019-01-28 PROCEDURE — 36415 COLL VENOUS BLD VENIPUNCTURE: CPT | Performed by: PHYSICIAN ASSISTANT

## 2019-01-28 PROCEDURE — 87081 CULTURE SCREEN ONLY: CPT | Mod: 59 | Performed by: PHYSICIAN ASSISTANT

## 2019-01-28 PROCEDURE — 86695 HERPES SIMPLEX TYPE 1 TEST: CPT | Performed by: PHYSICIAN ASSISTANT

## 2019-01-28 PROCEDURE — 87880 STREP A ASSAY W/OPTIC: CPT | Performed by: PHYSICIAN ASSISTANT

## 2019-01-28 PROCEDURE — 87389 HIV-1 AG W/HIV-1&-2 AB AG IA: CPT | Performed by: PHYSICIAN ASSISTANT

## 2019-01-28 PROCEDURE — 86780 TREPONEMA PALLIDUM: CPT | Performed by: PHYSICIAN ASSISTANT

## 2019-01-28 PROCEDURE — 87491 CHLMYD TRACH DNA AMP PROBE: CPT | Performed by: PHYSICIAN ASSISTANT

## 2019-01-28 PROCEDURE — 99214 OFFICE O/P EST MOD 30 MIN: CPT | Performed by: PHYSICIAN ASSISTANT

## 2019-01-28 PROCEDURE — 81001 URINALYSIS AUTO W/SCOPE: CPT | Performed by: PHYSICIAN ASSISTANT

## 2019-01-28 PROCEDURE — 87086 URINE CULTURE/COLONY COUNT: CPT | Performed by: PHYSICIAN ASSISTANT

## 2019-01-28 PROCEDURE — 86696 HERPES SIMPLEX TYPE 2 TEST: CPT | Performed by: PHYSICIAN ASSISTANT

## 2019-01-28 PROCEDURE — 87088 URINE BACTERIA CULTURE: CPT | Performed by: PHYSICIAN ASSISTANT

## 2019-01-28 PROCEDURE — 87591 N.GONORRHOEAE DNA AMP PROB: CPT | Performed by: PHYSICIAN ASSISTANT

## 2019-01-28 RX ORDER — PENICILLIN V POTASSIUM 500 MG/1
500 TABLET, FILM COATED ORAL 2 TIMES DAILY
Qty: 20 TABLET | Refills: 0 | Status: SHIPPED | OUTPATIENT
Start: 2019-01-28 | End: 2019-02-26

## 2019-01-28 RX ORDER — KETOCONAZOLE 20 MG/ML
SHAMPOO TOPICAL DAILY PRN
Qty: 120 ML | Refills: 11 | Status: SHIPPED | OUTPATIENT
Start: 2019-01-28 | End: 2019-05-01

## 2019-01-28 ASSESSMENT — PAIN SCALES - GENERAL: PAINLEVEL: NO PAIN (0)

## 2019-01-28 ASSESSMENT — MIFFLIN-ST. JEOR: SCORE: 2016.48

## 2019-01-28 NOTE — PATIENT INSTRUCTIONS
Ketoconazole shampoo apply to head and leave on for 5 minutes , wash every 2 days     Penicillin 1 tablet twice a day for 10 days

## 2019-01-29 LAB
BACTERIA SPEC CULT: ABNORMAL
BACTERIA SPEC CULT: NORMAL
C TRACH DNA SPEC QL NAA+PROBE: NEGATIVE
HIV 1+2 AB+HIV1 P24 AG SERPL QL IA: NONREACTIVE
HSV1 IGG SERPL QL IA: >8 AI (ref 0–0.8)
HSV2 IGG SERPL QL IA: 6 AI (ref 0–0.8)
N GONORRHOEA DNA SPEC QL NAA+PROBE: NEGATIVE
SPECIMEN SOURCE: ABNORMAL
SPECIMEN SOURCE: NORMAL
T PALLIDUM AB SER QL: NONREACTIVE

## 2019-01-31 RX ORDER — VALACYCLOVIR HYDROCHLORIDE 1 G/1
1000 TABLET, FILM COATED ORAL 2 TIMES DAILY
Qty: 20 TABLET | Refills: 0 | Status: SHIPPED | OUTPATIENT
Start: 2019-01-31 | End: 2019-02-26

## 2019-01-31 NOTE — RESULT ENCOUNTER NOTE
Please call the patient with these results:      Urine culture was positive for strep again, she needs to finish her and completely. 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 Its possible that sores in the mouth are caused by herpes. I have sent prescription for Valtrex 1000 mg take 1 tablet twice a day for 10 days to suppress this outbreak.   Becka Tavares PAC

## 2019-02-26 ENCOUNTER — OFFICE VISIT (OUTPATIENT)
Dept: FAMILY MEDICINE | Facility: CLINIC | Age: 39
End: 2019-02-26
Payer: COMMERCIAL

## 2019-02-26 VITALS
HEIGHT: 66 IN | HEART RATE: 81 BPM | WEIGHT: 292 LBS | TEMPERATURE: 97.5 F | BODY MASS INDEX: 46.93 KG/M2 | RESPIRATION RATE: 18 BRPM | OXYGEN SATURATION: 100 % | DIASTOLIC BLOOD PRESSURE: 85 MMHG | SYSTOLIC BLOOD PRESSURE: 139 MMHG

## 2019-02-26 DIAGNOSIS — K12.0 APHTHOUS ULCER: ICD-10-CM

## 2019-02-26 DIAGNOSIS — L02.411 ABSCESS OF AXILLA, RIGHT: Primary | ICD-10-CM

## 2019-02-26 DIAGNOSIS — R73.01 IMPAIRED FASTING GLUCOSE: ICD-10-CM

## 2019-02-26 DIAGNOSIS — E66.01 MORBID OBESITY (H): ICD-10-CM

## 2019-02-26 LAB
ALBUMIN SERPL-MCNC: 3.8 G/DL (ref 3.4–5)
ALP SERPL-CCNC: 77 U/L (ref 40–150)
ALT SERPL W P-5'-P-CCNC: 27 U/L (ref 0–50)
ANION GAP SERPL CALCULATED.3IONS-SCNC: 6 MMOL/L (ref 3–14)
ANISOCYTOSIS BLD QL SMEAR: SLIGHT
AST SERPL W P-5'-P-CCNC: 18 U/L (ref 0–45)
BILIRUB SERPL-MCNC: 0.3 MG/DL (ref 0.2–1.3)
BUN SERPL-MCNC: 8 MG/DL (ref 7–30)
CALCIUM SERPL-MCNC: 8.8 MG/DL (ref 8.5–10.1)
CHLORIDE SERPL-SCNC: 107 MMOL/L (ref 94–109)
CO2 SERPL-SCNC: 27 MMOL/L (ref 20–32)
CREAT SERPL-MCNC: 0.74 MG/DL (ref 0.52–1.04)
DIFFERENTIAL METHOD BLD: ABNORMAL
ELLIPTOCYTES BLD QL SMEAR: SLIGHT
EOSINOPHIL # BLD AUTO: 0.1 10E9/L (ref 0–0.7)
EOSINOPHIL NFR BLD AUTO: 1 %
ERYTHROCYTE [DISTWIDTH] IN BLOOD BY AUTOMATED COUNT: 18.7 % (ref 10–15)
FERRITIN SERPL-MCNC: 15 NG/ML (ref 12–150)
GFR SERPL CREATININE-BSD FRML MDRD: >90 ML/MIN/{1.73_M2}
GLUCOSE SERPL-MCNC: 81 MG/DL (ref 70–99)
HBA1C MFR BLD: 5.4 % (ref 0–5.6)
HCT VFR BLD AUTO: 34.3 % (ref 35–47)
HGB BLD-MCNC: 10.4 G/DL (ref 11.7–15.7)
HYPOCHROMIA BLD QL: PRESENT
LYMPHOCYTES # BLD AUTO: 1.4 10E9/L (ref 0.8–5.3)
LYMPHOCYTES NFR BLD AUTO: 24 %
MCH RBC QN AUTO: 23.9 PG (ref 26.5–33)
MCHC RBC AUTO-ENTMCNC: 30.3 G/DL (ref 31.5–36.5)
MCV RBC AUTO: 79 FL (ref 78–100)
MONOCYTES # BLD AUTO: 0.6 10E9/L (ref 0–1.3)
MONOCYTES NFR BLD AUTO: 11 %
NEUTROPHILS # BLD AUTO: 3.8 10E9/L (ref 1.6–8.3)
NEUTROPHILS NFR BLD AUTO: 64 %
PLATELET # BLD AUTO: 197 10E9/L (ref 150–450)
PLATELET # BLD EST: ABNORMAL 10*3/UL
POIKILOCYTOSIS BLD QL SMEAR: SLIGHT
POTASSIUM SERPL-SCNC: 4 MMOL/L (ref 3.4–5.3)
PROT SERPL-MCNC: 8.1 G/DL (ref 6.8–8.8)
RBC # BLD AUTO: 4.36 10E12/L (ref 3.8–5.2)
SODIUM SERPL-SCNC: 140 MMOL/L (ref 133–144)
TSH SERPL DL<=0.005 MIU/L-ACNC: 1.5 MU/L (ref 0.4–4)
WBC # BLD AUTO: 5.9 10E9/L (ref 4–11)

## 2019-02-26 PROCEDURE — 83036 HEMOGLOBIN GLYCOSYLATED A1C: CPT | Performed by: PREVENTIVE MEDICINE

## 2019-02-26 PROCEDURE — 85025 COMPLETE CBC W/AUTO DIFF WBC: CPT | Performed by: PREVENTIVE MEDICINE

## 2019-02-26 PROCEDURE — 36415 COLL VENOUS BLD VENIPUNCTURE: CPT | Performed by: PREVENTIVE MEDICINE

## 2019-02-26 PROCEDURE — 84443 ASSAY THYROID STIM HORMONE: CPT | Performed by: PREVENTIVE MEDICINE

## 2019-02-26 PROCEDURE — 82306 VITAMIN D 25 HYDROXY: CPT | Performed by: PREVENTIVE MEDICINE

## 2019-02-26 PROCEDURE — 80053 COMPREHEN METABOLIC PANEL: CPT | Performed by: PREVENTIVE MEDICINE

## 2019-02-26 PROCEDURE — 82728 ASSAY OF FERRITIN: CPT | Performed by: PREVENTIVE MEDICINE

## 2019-02-26 PROCEDURE — 99214 OFFICE O/P EST MOD 30 MIN: CPT | Performed by: PREVENTIVE MEDICINE

## 2019-02-26 RX ORDER — DOXYCYCLINE 100 MG/1
100 TABLET ORAL
COMMUNITY
Start: 2019-02-21 | End: 2019-05-24

## 2019-02-26 RX ORDER — TRIAMCINOLONE ACETONIDE 0.1 %
PASTE (GRAM) DENTAL 2 TIMES DAILY
Qty: 5 G | Refills: 1 | Status: SHIPPED | OUTPATIENT
Start: 2019-02-26 | End: 2019-07-02

## 2019-02-26 ASSESSMENT — MIFFLIN-ST. JEOR: SCORE: 2008.31

## 2019-02-26 NOTE — LETTER
February 28, 2019      Tiffany Sanz  [address]   [place] [address]        Dear Tiffany Sanz,     Three month glucose value is normal, you do not have diabetes or pre diabetes.   Basic blood count shows anemia but this is improved from last check.   Electrolytes, glucose, kidney function, liver function, thyroid function and iron stores are normal.   Vitamin D levels are low at 12, should be over 30. Low levels can cause fatigue and joint pains. Please take over the counter VITAMIN D 3 in a dose of 5000 units daily for 3 months.   Please let me know if you have any questions and thank you for choosing [facility].         Resulted Orders   Vitamin D Deficiency   Result Value Ref Range    Vitamin D Deficiency screening 12 (L) 20 - 75 ug/L      Comment:      Season, race, dietary intake, and treatment affect the concentration of   25-hydroxy-Vitamin D. Values may decrease during winter months and increase   during summer months. Values 20-29 ug/L may indicate Vitamin D insufficiency   and values <20 ug/L may indicate Vitamin D deficiency.  Vitamin D determination is routinely performed by an immunoassay specific for   25 hydroxyvitamin D3.  If an individual is on vitamin D2 (ergocalciferol)   supplementation, please specify 25 OH vitamin D2 and D3 level determination by   LCMSMS test VITD23.     TSH with free T4 reflex   Result Value Ref Range    TSH 1.50 0.40 - 4.00 mU/L   CBC with platelets differential   Result Value Ref Range    WBC 5.9 4.0 - 11.0 10e9/L    RBC Count 4.36 3.8 - 5.2 10e12/L    Hemoglobin 10.4 (L) 11.7 - 15.7 g/dL    Hematocrit 34.3 (L) 35.0 - 47.0 %    MCV 79 78 - 100 fl    MCH 23.9 (L) 26.5 - 33.0 pg    MCHC 30.3 (L) 31.5 - 36.5 g/dL      Comment:      Results confirmed by repeat test    RDW 18.7 (H) 10.0 - 15.0 %    Platelet Count 197 150 - 450 10e9/L    % Neutrophils 64.0 %    % Lymphocytes 24.0 %    % Monocytes 11.0 %    % Eosinophils 1.0 %    Absolute Neutrophil 3.8 1.6 - 8.3  10e9/L    Absolute Lymphocytes 1.4 0.8 - 5.3 10e9/L    Absolute Monocytes 0.6 0.0 - 1.3 10e9/L    Absolute Eosinophils 0.1 0.0 - 0.7 10e9/L    Anisocytosis Slight     Poikilocytosis Slight     Elliptocytes Slight     Hypochromasia Present     Platelet Estimate       Automated count confirmed.  Platelet morphology is normal.    Diff Method Automated Method    Comprehensive metabolic panel   Result Value Ref Range    Sodium 140 133 - 144 mmol/L    Potassium 4.0 3.4 - 5.3 mmol/L    Chloride 107 94 - 109 mmol/L    Carbon Dioxide 27 20 - 32 mmol/L    Anion Gap 6 3 - 14 mmol/L    Glucose 81 70 - 99 mg/dL      Comment:      Non Fasting    Urea Nitrogen 8 7 - 30 mg/dL    Creatinine 0.74 0.52 - 1.04 mg/dL    GFR Estimate >90 >60 mL/min/[1.73_m2]      Comment:      Non  GFR Calc  Starting 12/18/2018, serum creatinine based estimated GFR (eGFR) will be   calculated using the Chronic Kidney Disease Epidemiology Collaboration   (CKD-EPI) equation.      GFR Estimate If Black >90 >60 mL/min/[1.73_m2]      Comment:       GFR Calc  Starting 12/18/2018, serum creatinine based estimated GFR (eGFR) will be   calculated using the Chronic Kidney Disease Epidemiology Collaboration   (CKD-EPI) equation.      Calcium 8.8 8.5 - 10.1 mg/dL    Bilirubin Total 0.3 0.2 - 1.3 mg/dL    Albumin 3.8 3.4 - 5.0 g/dL    Protein Total 8.1 6.8 - 8.8 g/dL    Alkaline Phosphatase 77 40 - 150 U/L    ALT 27 0 - 50 U/L    AST 18 0 - 45 U/L   Ferritin   Result Value Ref Range    Ferritin 15 12 - 150 ng/mL   Hemoglobin A1c   Result Value Ref Range    Hemoglobin A1C 5.4 0 - 5.6 %      Comment:      Normal <5.7% Prediabetes 5.7-6.4%  Diabetes 6.5% or higher - adopted from ADA   consensus guidelines.         If you have any questions or concerns, please call the clinic at the number listed above.       Sincerely,        Estefania Berrios MD/[facility]

## 2019-02-26 NOTE — PROGRESS NOTES
SUBJECTIVE:   Tiffany Sanz is a 38 year old female who presents to clinic today for the following health issues:      Concern - Cyst f/u   Onset: Noticed 2 weeks ago     Description:   Right underarm - already drained.     Intensity: tender     Progression of Symptoms:  improving    Accompanying Signs & Symptoms:Per patient it doesn't seem like incision is healing.   Therapies Tried and outcome: Still currently on antibiotic Doxycycline Day 5/7     Has had these before  No fever    Seen at [facility] ER 2/21/19:  Following is a summary from Care Everywhere:    MDM:   Tiffany Sanz is a 38 y.o. female who presents with an abscess. The patient's symptoms are most consistent with an abscess, although I did consider the fact that this was lymphadenopathy, as she had somewhat firm. She does not have any lymphadenopathy elsewhere. Given the fact that it was suddenly tender, and she has a couple of small pea sized areas of tenderness surrounding the main area of tenderness, I thought this was more consistent with abscess. I proceeded with I&D as above. There was purulent material initially, so I do feel that there is abscess here. She can treat it the usual way and follow up in three days to make sure that this is resolving, and that there is nothing more going on. The skin is not markedly cellulitic, but she does have dark skin so it is harder to tell. Given the history of MRSA, I am going to put her on a short course of Doxycycline in the meantime.     DIAGNOSIS:   1. Right axillary abscess      Oral lesions:  White spots in side the mouth, no pain, intermittent, 09/2018.   Has been treated for Herpes Simplex.   The lesion in the mouth, was temporarily better but has increased again   No bleeding   More on the lips or buccal mucosa       Obesity:  -Busy single mom of two children (2 years and 7 years)  -States the main barrier to losing weight is that she has no support at home  -No exercise due to time  constraints and lives on third floor of an apartment (hence not able to exercise at home due to downstairs neighbors)   -had gestational diabetes and impaired glucose at last check     Problem list and histories reviewed & adjusted, as indicated.  Additional history: as documented    Patient Active Problem List   Diagnosis     CARDIOVASCULAR SCREENING; LDL GOAL LESS THAN 160     Morbid obesity (H)     Past Surgical History:   Procedure Laterality Date     NO HISTORY OF SURGERY         Social History     Tobacco Use     Smoking status: Never Smoker     Smokeless tobacco: Never Used     Tobacco comment: Roommate smokes   Substance Use Topics     Alcohol use: Yes     No family history on file.      Current Outpatient Medications   Medication Sig Dispense Refill     doxycycline monohydrate (ADOXA) 100 MG tablet Take 100 mg by mouth       ketoconazole (NIZORAL) 2 % external shampoo Apply topically daily as needed for itching or irritation 120 mL 11     Prenatal Vit-Fe Sulfate-FA (PRENATAL MULTIVIT-IRON PO)        triamcinolone (KENALOG) 0.1 % paste Take by mouth 2 times daily 5 g 1     hydrocortisone 2.5 % cream Apply BID to affected region(s) for 7-10 days. (Patient not taking: Reported on 2/26/2019) 30 g 0     Allergies   Allergen Reactions     Nkda [No Known Drug Allergies]      Sulfa Drugs Itching     BP Readings from Last 3 Encounters:   02/26/19 139/85   01/28/19 138/89   01/10/19 (!) 153/94    Wt Readings from Last 3 Encounters:   02/26/19 132.5 kg (292 lb)   01/28/19 133.3 kg (293 lb 12.8 oz)   01/10/19 133.3 kg (293 lb 12.8 oz)                  Labs reviewed in EPIC    Reviewed and updated as needed this visit by clinical staff  Tobacco  Allergies  Meds  Problems       Reviewed and updated as needed this visit by Provider  Allergies  Problems         ROS:  Constitutional, neuro, ENT, endocrine, pulmonary, cardiac, gastrointestinal, genitourinary, musculoskeletal, integument and psychiatric systems are  "negative, except as otherwise noted.    OBJECTIVE:                                                    /85 (BP Location: Left arm)   Pulse 81   Temp 97.5  F (36.4  C) (Oral)   Resp 18   Ht 1.664 m (5' 5.5\")   Wt 132.5 kg (292 lb)   LMP 01/20/2019 (Approximate)   SpO2 100%   BMI 47.85 kg/m    Body mass index is 47.85 kg/m .  GENERAL APPEARANCE: healthy, alert and no distress  EYES: Eyes grossly normal to inspection and conjunctivae and sclerae normal  RESP: lungs clear to auscultation - no rales, rhonchi or wheezes  CV: regular rates and rhythm, normal S1 S2, no S3 or S4 and no murmur, click or rub  ABDOMEN: soft, non-tender  MS: extremities normal- no gross deformities noted  SKIN: no suspicious lesions or rashes  NEURO: Normal strength and tone, mentation intact and speech normal  PSYCH: mentation appears normal  Right Axilla: 0.5 cms linear incision noted. No erythema, no edema, healing well, mild tenderness, no drainage, no induration, no fluctuance   Mouth: small aphthous ulcer noted on the right buccal mucosa     Diagnostic test results:  Diagnostic Test Results:  Results for orders placed or performed in visit on 02/26/19 (from the past 24 hour(s))   Hemoglobin A1c   Result Value Ref Range    Hemoglobin A1C 5.4 0 - 5.6 %        ASSESSMENT/PLAN:                                                    1. Abscess of axilla, right  -S/P incision and drainage   -Continue with Doxycycline for a total of 7 days   -healing as expected     2. Morbid obesity (H)  - Vitamin D Deficiency  - TSH with free T4 reflex  - CBC with platelets differential  - Comprehensive metabolic panel  - Ferritin  - Hemoglobin A1c  - BARIATRIC ADULT REFERRAL    3. Aphthous ulcer  - triamcinolone (KENALOG) 0.1 % paste; Take by mouth 2 times daily  Dispense: 5 g; Refill: 1    4. Impaired fasting glucose  -Last HbA1C was 5.7, levels today are improved   - Hemoglobin A1c  - BARIATRIC ADULT REFERRAL      Follow up with Provider - if axilla " in not better in 2 weeks      Estefania Berrios MD MPH    [facility]

## 2019-02-28 LAB — DEPRECATED CALCIDIOL+CALCIFEROL SERPL-MC: 12 UG/L (ref 20–75)

## 2019-02-28 NOTE — RESULT ENCOUNTER NOTE
Please send a letter:    Dear Tiffany Sanz,    Three month glucose value is normal, you do not have diabetes or pre diabetes.  Basic blood count shows anemia but this is improved from last check.  Electrolytes, glucose, kidney function, liver function, thyroid function and iron stores are normal.  Vitamin D levels are low at 12, should be over 30. Low levels can cause fatigue and joint pains. Please take over the counter VITAMIN D 3 in a dose of 5000 units daily for 3 months.   Please let me know if you have any questions and thank you for choosing [facility].    Regards,    Estefania Berrios MD MPH

## 2019-03-21 ENCOUNTER — OFFICE VISIT (OUTPATIENT)
Dept: OBGYN | Facility: CLINIC | Age: 39
End: 2019-03-21
Payer: COMMERCIAL

## 2019-03-21 VITALS
SYSTOLIC BLOOD PRESSURE: 134 MMHG | OXYGEN SATURATION: 100 % | HEART RATE: 82 BPM | BODY MASS INDEX: 48.18 KG/M2 | WEIGHT: 293 LBS | DIASTOLIC BLOOD PRESSURE: 82 MMHG

## 2019-03-21 DIAGNOSIS — E66.01 MORBID OBESITY (H): ICD-10-CM

## 2019-03-21 DIAGNOSIS — D25.2 SUBSEROUS LEIOMYOMA OF UTERUS: ICD-10-CM

## 2019-03-21 DIAGNOSIS — E55.9 VITAMIN D DEFICIENCY: Primary | ICD-10-CM

## 2019-03-21 DIAGNOSIS — N92.0 MENORRHAGIA WITH REGULAR CYCLE: ICD-10-CM

## 2019-03-21 DIAGNOSIS — L65.9 ALOPECIA: ICD-10-CM

## 2019-03-21 DIAGNOSIS — Z11.3 SCREEN FOR STD (SEXUALLY TRANSMITTED DISEASE): ICD-10-CM

## 2019-03-21 DIAGNOSIS — D50.0 IRON DEFICIENCY ANEMIA DUE TO CHRONIC BLOOD LOSS: ICD-10-CM

## 2019-03-21 PROBLEM — E78.5 HYPERLIPIDEMIA: Status: ACTIVE | Noted: 2019-03-21

## 2019-03-21 PROCEDURE — 87340 HEPATITIS B SURFACE AG IA: CPT | Performed by: OBSTETRICS & GYNECOLOGY

## 2019-03-21 PROCEDURE — 86803 HEPATITIS C AB TEST: CPT | Performed by: OBSTETRICS & GYNECOLOGY

## 2019-03-21 PROCEDURE — 99203 OFFICE O/P NEW LOW 30 MIN: CPT | Performed by: OBSTETRICS & GYNECOLOGY

## 2019-03-21 PROCEDURE — 36415 COLL VENOUS BLD VENIPUNCTURE: CPT | Performed by: OBSTETRICS & GYNECOLOGY

## 2019-03-21 NOTE — Clinical Note
Please notify patient may now schedule the IV iron treatment at [facility], weight loss management, and Derm consults- orders are in place.

## 2019-03-21 NOTE — PATIENT INSTRUCTIONS
If you have any questions regarding your visit, Please contact your care team.     [facility]: [phone]  [facility] CLINIC HOURS TELEPHONE NUMBER       Jules Barclay M.D.        [place]-    RN-      Omni-Medical Assistant       Monday-[facility]  8:00a.m-4:45 p.m  Tuesday-[place]  9:00a.m-4:00 p.m  Wednesday-[place] 8:00a.m-4:45 p.m.  Thursday-[place]  8:00a.m-4:45 p.m.  Friday-[place]  8:00a.m-4:45 p.m. [facility]  [address]  [place], [place] [address] [phone] ask [facility]  [phone] Fax  Imaging [phone]    [facility]  [address] [place] [address] [address].  [place], [place] [address] [phone]    [facility]  [address] [place]  [place] [address]  [phone] ask [facility]  [phone] Fax  Imaging [phone]     Urgent Care locations:    [place]        [place] Monday-Friday  5 pm - 9 pm  Saturday and Sunday   9 am - 5 pm  Monday-Friday   11 am - 9 pm  Saturday and Sunday   9 am - 5 pm   [phone]   If you need a medication refill, please contact your pharmacy. Please allow 3 business days for your refill to be completed.  As always, Thank you for trusting us with your healthcare needs!

## 2019-03-22 LAB
HBV SURFACE AG SERPL QL IA: NONREACTIVE
HCV AB SERPL QL IA: NONREACTIVE

## 2019-03-22 NOTE — PROGRESS NOTES
OB-GYN Problem-Oriented Visit or Consultation      Tiffany Sanz is a 38 year old year old P 2 who presents with a chief complaint of fibroids and multiple other concerns.  Referred by self.  Patient's last menstrual period was 2019 (approximate).    HPI:     Menses q 28-30 days x 7-8 days with heavy flow and then tapering off. Stable for over 1 yr. No pain. Had evaluation last yr at [facility] and ultrasound noted three 1-3 cm subserosal fibroids, treatment options reviewed and hysterectomy advised but patient reluctant. She is actually open to pregnancy despite AMA, past  section, and obesity risks. Not using contraception and last sex was yest. Has chronic iron def anemia and has used some oral iron and PNVits. Did not tolerated OC in the past (BTB). Recent Hgb 10 and ferritin 15. She wondered about treatment of uterine walls, endometrial ablation?, but not indicated if still planning pregnancy.     Other concerns include medical weight management referral, alopecia since last delivery and with iron def- had past Derm evaluation elsewhere but desires another evaluation. Had folliculitis at [facility] and had right axillary abscess I&D recently. Thinks ex- was dorado and worried about STD although past testing reviewed.     Past medical, obstetrical, surgical, family and social history reviewed and as noted or updated in chart.     Allergies, meds and supplements are as noted or updated in chart.      ROS:   Systems reviewed include:  constitutional, gastrointestinal, genitourinary, integumentary, psychological, hematologic/lymphatic and endocrine.    These systems were negative for significant symptoms except for the following additional: none; see HPI.    EXAM:  VS as noted. /82 (BP Location: Left arm, Patient Position: Chair, Cuff Size: Adult Large)   Pulse 82   Wt 133.4 kg (294 lb)   LMP 2019 (Approximate)   SpO2 100%   Breastfeeding? No   BMI 48.18 kg/m    Constitutionally normal.      Exam not repeated at this time.    Assessment:   Encounter Diagnoses   Name Primary?     Vitamin D deficiency Yes     Screen for STD (sexually transmitted disease)      Subserous leiomyoma of uterus      Menorrhagia with regular cycle      Iron deficiency anemia due to chronic blood loss      Morbid obesity (H)      Alopecia        I reviewed the condition, causes, differential diagnosis, prognosis, evaluation and management considerations and options.  Questions answered and information given. See orders.         Plan and Recommendations: Chronic menorrhagia with secondary anemia. Suspect more DUB though small fibroids may contribute. Observe. IV iron now since the oral iron is not correcting this.   Preconception counseling reviewed as needed including cycle timing and optimization, medical status, meds, vaccines, exposures, nutrition, folic acid, family history, genetic screening (CF carrier scr, etc.), social issues and any applicable specific risk factors.   Check hepatitis tests to complete the STD screening. Vit D def prescription treatment. Referrals as noted.     Total encounter time (physician together with patient) = 40min. Direct counseling, education and care coordination time (physician together with patient) = 25min.     A/P:  Tiffany was seen today for consult.    Diagnoses and all orders for this visit:    Vitamin D deficiency  -     vitamin D3 (CHOLECALCIFEROL) 21092 units capsule; Take 1 capsule (50,000 Units) by mouth every 7 days    Screen for STD (sexually transmitted disease)  -     Hepatitis B surface antigen  -     Hepatitis C antibody    Subserous leiomyoma of uterus    Menorrhagia with regular cycle    Iron deficiency anemia due to chronic blood loss    Morbid obesity (H)  -     BARIATRIC ADULT REFERRAL    Alopecia  -     DERMATOLOGY REFERRAL    Other orders  -     iron sucrose (VENOFER) 300 mg in sodium chloride 0.9 % intermittent infusion  -     INFUSION HYPERSENSITIVITY        Jules  MD Ning

## 2019-04-03 ENCOUNTER — OFFICE VISIT (OUTPATIENT)
Dept: URGENT CARE | Facility: URGENT CARE | Age: 39
End: 2019-04-03
Payer: COMMERCIAL

## 2019-04-03 VITALS
BODY MASS INDEX: 47.85 KG/M2 | SYSTOLIC BLOOD PRESSURE: 169 MMHG | HEART RATE: 84 BPM | WEIGHT: 292 LBS | TEMPERATURE: 98.4 F | OXYGEN SATURATION: 100 % | DIASTOLIC BLOOD PRESSURE: 101 MMHG

## 2019-04-03 DIAGNOSIS — R03.0 ELEVATED BLOOD PRESSURE READING WITHOUT DIAGNOSIS OF HYPERTENSION: ICD-10-CM

## 2019-04-03 DIAGNOSIS — N60.81 CYST, SEBACEOUS, BREAST, RIGHT: Primary | ICD-10-CM

## 2019-04-03 PROCEDURE — 99214 OFFICE O/P EST MOD 30 MIN: CPT | Performed by: NURSE PRACTITIONER

## 2019-04-03 RX ORDER — CEPHALEXIN 500 MG/1
500 CAPSULE ORAL 3 TIMES DAILY
Qty: 30 CAPSULE | Refills: 0 | Status: SHIPPED | OUTPATIENT
Start: 2019-04-03 | End: 2019-05-01

## 2019-04-03 ASSESSMENT — ENCOUNTER SYMPTOMS
CHILLS: 0
DIARRHEA: 0
SORE THROAT: 0
VOMITING: 0
FATIGUE: 1
SHORTNESS OF BREATH: 0
RHINORRHEA: 0
HEADACHES: 0
FEVER: 0
COUGH: 0
NAUSEA: 0

## 2019-04-04 NOTE — PATIENT INSTRUCTIONS
Patient Education     Epidermoid Cyst (Sebaceous Cyst), Infected (Antibiotic Treatment)  You have an epidermoid cyst. This is a small, painless lump under your skin. An epidermoid cyst (often called a sebaceous cyst, epidermal cyst, or epidermal inclusion cyst) is a term most often used for 2 similar types of cysts:    Epidermoid cysts. These cysts form slowly under the skin. They can be found on most parts of the body. But they are most often found on areas with more hair such as the scalp, face, upper back, and genitals.    Pilar cysts. These are similar to epidermoid cysts. But they start from a different part of the hair follicle. They are more likely to be on the scalp.  Here are some general facts about these cysts:    A cyst is a sac filled with material that is often cheesy, fatty, oily, or stringy. The material inside can be thick. Or it can be a liquid.    You can usually move the cyst slightly if you try.    The cysts can be smaller than a pea or as large as a few inches.    The cysts are usually not painful, unless they become inflamed or infected.    The area around the cyst may smell bad. If the cyst breaks open, the material inside it often smells bad as well.  Your cyst became infected and your healthcare provider wanted to treat it with antibiotics. If the antibiotics don t clear up the infection, the cyst will need to be drained by making a small cut (incision). Local anesthesia will be used to numb the area.  Home care    Resist the temptation to squeeze or pop the cyst, stick a needle in it, or cut it open. This often leads to a worsening infection and scarring.    Take the antibiotic as directed until it is all used up.    Soak the affected area in hot water or apply a hot pack (a thin, clean towel soaked in hot water) for 20 minutes at a time. Do this 3 to 4 times a day.    Apply antibiotic cream or ointment 3 times a day.    You may use over-the-counter pain medicine to control pain, unless  another medicine was given. If you have chronic liver or kidney disease or ever had a stomach ulcer or GI bleeding, talk with your healthcare provider before using these medicines.  Prevention  Once this infection has healed, reduce the risk of future infections by:    Keeping the cyst area clean by bathing or showering daily    Avoiding tight-fitting clothing in the cyst area  Follow-up care  Follow up with your healthcare provider, or as advised. If a gauze packing was put in your wound, it should be removed in a few days as advised by your healthcare provider. Check your wound every day for the signs listed below.  When to seek medical advice  Call your healthcare provider right away if any of these occur:    Pus coming from the cyst    Increasing redness around the wound    Increasing local pain or swelling    Fever of 100.4 F (38 C) or higher, or as directed by your provider  Date Last Reviewed: 10/5/2016    [id number] [employer]. [address] [place] [address], [place] [address]. All rights reserved. This information is not intended as a substitute for professional medical care. Always follow your healthcare professional's instructions.

## 2019-04-04 NOTE — PROGRESS NOTES
SUBJECTIVE:   Tiffany Sanz is a 38 year old female presenting with a chief complaint of   Chief Complaint   Patient presents with     Breast Problem     Patient complains of red spot on right breast area        She is an established patient of [facility].    Cyst on right breast  Onset of symptoms was 3 day(s) ago.  Course of illness is worsening.    Severity moderate  Current and Associated symptoms: tender, drainage, fatigue and hard swelling  Treatment measures tried include None tried.  Predisposing factors include History of boils.        Review of Systems   Constitutional: Positive for fatigue. Negative for chills and fever.   HENT: Negative for congestion, ear pain, rhinorrhea and sore throat.    Respiratory: Negative for cough and shortness of breath.    Gastrointestinal: Negative for diarrhea, nausea and vomiting.   Skin:        Boil on right breast   Neurological: Negative for headaches.   All other systems reviewed and are negative.      Past Medical History:   Diagnosis Date     Chlamydia 2016     Genital herpes      Hyperlipidemia 3/21/2019    Overview:  Created by Conversion     Morbid obesity (H) 9/6/2018     No family history on file.  Current Outpatient Medications   Medication Sig Dispense Refill     cephALEXin (KEFLEX) 500 MG capsule Take 1 capsule (500 mg) by mouth 3 times daily for 10 days 30 capsule 0     doxycycline monohydrate (ADOXA) 100 MG tablet Take 100 mg by mouth       IRON PO        ketoconazole (NIZORAL) 2 % external shampoo Apply topically daily as needed for itching or irritation 120 mL 11     Prenatal Vit-Fe Sulfate-FA (PRENATAL MULTIVIT-IRON PO)        triamcinolone (KENALOG) 0.1 % paste Take by mouth 2 times daily 5 g 1     vitamin D3 (CHOLECALCIFEROL) 85954 units capsule Take 1 capsule (50,000 Units) by mouth every 7 days 8 capsule 0     hydrocortisone 2.5 % cream Apply BID to affected region(s) for 7-10 days. (Patient not taking: Reported on 2/26/2019) 30 g 0     Social History      Tobacco Use     Smoking status: Never Smoker     Smokeless tobacco: Never Used   Substance Use Topics     Alcohol use: Yes       OBJECTIVE  BP (!) 169/101 (BP Location: Left arm, Patient Position: Chair, Cuff Size: Adult Regular)   Pulse 84   Temp 98.4  F (36.9  C) (Oral)   Wt 132.5 kg (292 lb)   SpO2 100%   BMI 47.85 kg/m      Physical Exam   Constitutional: No distress.   HENT:   Right Ear: Tympanic membrane normal.   Left Ear: Tympanic membrane normal.   Eyes: EOM are normal. Pupils are equal, round, and reactive to light.   Neck: Normal range of motion. Neck supple.   Cardiovascular: Normal rate and normal heart sounds.   Pulmonary/Chest: Effort normal and breath sounds normal. No respiratory distress.   Musculoskeletal: Normal range of motion.   Lymphadenopathy:     She has no cervical adenopathy.   Neurological: She is alert. No cranial nerve deficit.   Skin: Skin is warm and dry. She is not diaphoretic.   A sebaceous cyst on the right breast, draining yellow pus, feels firm to touch and tender.     Psychiatric: She has a normal mood and affect.   Nursing note and vitals reviewed.    ASSESSMENT:      ICD-10-CM    1. Cyst, sebaceous, breast, right N60.81 cephALEXin (KEFLEX) 500 MG capsule   2. Elevated blood pressure reading without diagnosis of hypertension R03.0       PLAN:  I recommend follow up with PCP in 3 days or sooner if symptoms are getting worse  Side effects of medications discussed  All questions are answered and patient is in agreement with treatment plan  Kate Mcneil  [facility]  Family Nurse Practitoner      Tiffany to follow up with Primary Care provider regarding elevated blood pressure.      Patient Instructions     Patient Education     Epidermoid Cyst (Sebaceous Cyst), Infected (Antibiotic Treatment)  You have an epidermoid cyst. This is a small, painless lump under your skin. An epidermoid cyst (often called a sebaceous cyst, epidermal cyst, or epidermal inclusion cyst) is a term most  often used for 2 similar types of cysts:    Epidermoid cysts. These cysts form slowly under the skin. They can be found on most parts of the body. But they are most often found on areas with more hair such as the scalp, face, upper back, and genitals.    Pilar cysts. These are similar to epidermoid cysts. But they start from a different part of the hair follicle. They are more likely to be on the scalp.  Here are some general facts about these cysts:    A cyst is a sac filled with material that is often cheesy, fatty, oily, or stringy. The material inside can be thick. Or it can be a liquid.    You can usually move the cyst slightly if you try.    The cysts can be smaller than a pea or as large as a few inches.    The cysts are usually not painful, unless they become inflamed or infected.    The area around the cyst may smell bad. If the cyst breaks open, the material inside it often smells bad as well.  Your cyst became infected and your healthcare provider wanted to treat it with antibiotics. If the antibiotics don t clear up the infection, the cyst will need to be drained by making a small cut (incision). Local anesthesia will be used to numb the area.  Home care    Resist the temptation to squeeze or pop the cyst, stick a needle in it, or cut it open. This often leads to a worsening infection and scarring.    Take the antibiotic as directed until it is all used up.    Soak the affected area in hot water or apply a hot pack (a thin, clean towel soaked in hot water) for 20 minutes at a time. Do this 3 to 4 times a day.    Apply antibiotic cream or ointment 3 times a day.    You may use over-the-counter pain medicine to control pain, unless another medicine was given. If you have chronic liver or kidney disease or ever had a stomach ulcer or GI bleeding, talk with your healthcare provider before using these medicines.  Prevention  Once this infection has healed, reduce the risk of future infections by:    Keeping  the cyst area clean by bathing or showering daily    Avoiding tight-fitting clothing in the cyst area  Follow-up care  Follow up with your healthcare provider, or as advised. If a gauze packing was put in your wound, it should be removed in a few days as advised by your healthcare provider. Check your wound every day for the signs listed below.  When to seek medical advice  Call your healthcare provider right away if any of these occur:    Pus coming from the cyst    Increasing redness around the wound    Increasing local pain or swelling    Fever of 100.4 F (38 C) or higher, or as directed by your provider  Date Last Reviewed: 10/5/2016    [phone] [employer]. [address] [place] [address], [place] [address]. All rights reserved. This information is not intended as a substitute for professional medical care. Always follow your healthcare professional's instructions.

## 2019-04-09 ENCOUNTER — OFFICE VISIT (OUTPATIENT)
Dept: FAMILY MEDICINE | Facility: CLINIC | Age: 39
End: 2019-04-09
Payer: COMMERCIAL

## 2019-04-09 VITALS
TEMPERATURE: 98.6 F | BODY MASS INDEX: 47.09 KG/M2 | RESPIRATION RATE: 16 BRPM | OXYGEN SATURATION: 100 % | WEIGHT: 293 LBS | SYSTOLIC BLOOD PRESSURE: 135 MMHG | HEART RATE: 86 BPM | HEIGHT: 66 IN | DIASTOLIC BLOOD PRESSURE: 89 MMHG

## 2019-04-09 DIAGNOSIS — K42.9 UMBILICAL HERNIA WITHOUT OBSTRUCTION AND WITHOUT GANGRENE: ICD-10-CM

## 2019-04-09 DIAGNOSIS — L72.3 INFECTED SEBACEOUS CYST: Primary | ICD-10-CM

## 2019-04-09 DIAGNOSIS — L08.9 INFECTED SEBACEOUS CYST: Primary | ICD-10-CM

## 2019-04-09 DIAGNOSIS — Z32.02 PREGNANCY EXAMINATION OR TEST, NEGATIVE RESULT: ICD-10-CM

## 2019-04-09 LAB — HCG UR QL: NEGATIVE

## 2019-04-09 PROCEDURE — 99214 OFFICE O/P EST MOD 30 MIN: CPT | Performed by: NURSE PRACTITIONER

## 2019-04-09 PROCEDURE — 81025 URINE PREGNANCY TEST: CPT | Performed by: NURSE PRACTITIONER

## 2019-04-09 ASSESSMENT — PAIN SCALES - GENERAL: PAINLEVEL: NO PAIN (0)

## 2019-04-09 ASSESSMENT — MIFFLIN-ST. JEOR: SCORE: 2017.39

## 2019-04-09 NOTE — PATIENT INSTRUCTIONS
Continue cephalexin and doxycycline  Follow up with general surgery for both the breast abscess and concern for umbilical hernia    At [facility], we strive to deliver an exceptional experience to you, every time we see you.  If you receive a survey in the mail, please send us back your thoughts. We really do value your feedback.    Based on your medical history, these are the current health maintenance/preventive care services that you are due for (some may have been done at this visit.)  Health Maintenance Due   Topic Date Due     PREVENTIVE CARE VISIT  1979     INFLUENZA VACCINE (1) 09/01/2018         Suggested websites for health information:  Www[employer] : Up to date and easily searchable information on multiple topics.  Www.medlineplus.gov : medication info, interactive tutorials, watch real surgeries online  Www.familydoctor.org : good info from the Academy of Family Physicians  Www.cdc.gov : public health info, travel advisories, epidemics (H1N1)  Www.aap.org : children's health info, normal development, vaccinations  Www.health.[facility].[place].us : [place] dept of health, public health issues in [place], N1N1    Your care team:                            Family Medicine Internal Medicine   MD Yvan Sloan MD Shantel Branch-Fleming, MD Katya Georgiev PA-C Nam Ho, MD Pediatrics   EBONY Wellington, CHRISS Lang APRN CNP   MD Mey James MD Deborah Mielke, MD Kim Thein, APRN [facility]      Clinic hours: Monday - Thursday 7 am-7 pm; Fridays 7 am-5 pm.   Urgent care: Monday - Friday 11 am-9 pm; Saturday and Sunday 9 am-5 pm.  Pharmacy : Monday -Thursday 8 am-8 pm; Friday 8 am-6 pm; Saturday and Sunday 9 am-5 pm.     Clinic: [phone]   Pharmacy: [phone]

## 2019-04-09 NOTE — PROGRESS NOTES
"  SUBJECTIVE:   Tiffany Sanz is a 38 year old female who presents to clinic today for the following   health issues:      Concern - Recheck Infection on breast  Onset: ongoing    Description:   infection on right breast    Intensity: moderate    Progression of Symptoms:  improving    Accompanying Signs & Symptoms:  None    Previous history of similar problem:   None    Precipitating factors:   Worsened by: None    Alleviating factors:  Improved by: None    Therapies Tried and outcome: Antibiotics: effective    Abdominal mass: lower abdomen  Noticed 2 days ago  Mild discomfort    Seen in UC and then emergency department for breast abscess. On keflex and doxycycline. Had it drained in the emergency department. No fevers. Feels like it's getting better. Wants to talk to surgeon about getting it removed completely.    Noticed mass around belly button few days ago. It is \"sore.\" normal BMs. Doesn't hurt when she touches it.    Would like pregnancy test. Took one at home an it said \"?\"        Additional history: as documented    Reviewed  and updated as needed this visit by clinical staff         Reviewed and updated as needed this visit by Provider  Tobacco  Allergies  Meds  Problems  Med Hx  Surg Hx  Fam Hx         Patient Active Problem List   Diagnosis     CARDIOVASCULAR SCREENING; LDL GOAL LESS THAN 160     Morbid obesity (H)     Iron deficiency anemia due to chronic blood loss     Subserous leiomyoma of uterus     Hyperlipidemia     Past Surgical History:   Procedure Laterality Date      SECTION  2017     HC INSERTION INTRAUTERINE DEVICE  2017    [facility]     HC REMOVE INTRAUTERINE DEVICE  2017       Social History     Tobacco Use     Smoking status: Never Smoker     Smokeless tobacco: Never Used   Substance Use Topics     Alcohol use: Yes     History reviewed. No pertinent family history.      Current Outpatient Medications   Medication Sig Dispense Refill     cephALEXin (KEFLEX) 500 MG capsule " "Take 1 capsule (500 mg) by mouth 3 times daily for 10 days 30 capsule 0     doxycycline monohydrate (ADOXA) 100 MG tablet Take 100 mg by mouth       Prenatal Vit-Fe Sulfate-FA (PRENATAL MULTIVIT-IRON PO)        hydrocortisone 2.5 % cream Apply BID to affected region(s) for 7-10 days. (Patient not taking: Reported on 2/26/2019) 30 g 0     IRON PO        ketoconazole (NIZORAL) 2 % external shampoo Apply topically daily as needed for itching or irritation (Patient not taking: Reported on 4/9/2019) 120 mL 11     triamcinolone (KENALOG) 0.1 % paste Take by mouth 2 times daily (Patient not taking: Reported on 4/9/2019) 5 g 1     vitamin D3 (CHOLECALCIFEROL) 91178 units capsule Take 1 capsule (50,000 Units) by mouth every 7 days (Patient not taking: Reported on 4/9/2019) 8 capsule 0     Allergies   Allergen Reactions     Nkda [No Known Drug Allergies]      Sulfa Drugs Itching     BP Readings from Last 3 Encounters:   04/09/19 135/89   04/03/19 (!) 169/101   03/21/19 134/82    Wt Readings from Last 3 Encounters:   04/09/19 133.4 kg (294 lb)   04/03/19 132.5 kg (292 lb)   03/21/19 133.4 kg (294 lb)                  Labs reviewed in EPIC    ROS:  Constitutional, HEENT, cardiovascular, pulmonary, GI, , musculoskeletal, neuro, skin, endocrine and psych systems are negative, except as otherwise noted.    OBJECTIVE:     /89   Pulse 86   Temp 98.6  F (37  C) (Oral)   Resp 16   Ht 1.664 m (5' 5.5\")   Wt 133.4 kg (294 lb)   LMP  (LMP Unknown)   SpO2 100%   Breastfeeding? No   BMI 48.18 kg/m    Body mass index is 48.18 kg/m .  GENERAL: healthy, alert and no distress  NECK: no adenopathy, no asymmetry, masses, or scars and thyroid normal to palpation  RESP: lungs clear to auscultation - no rales, rhonchi or wheezes  BREAST: mild erythema and warmth to right breast. It is less than where the marking is from her emergency department visit  CV: regular rate and rhythm, normal S1 S2, no S3 or S4, no murmur, click or rub, " no peripheral edema and peripheral pulses strong  ABDOMEN: obese, suspect small umbilical hernia  MS: no gross musculoskeletal defects noted, no edema  PSYCH: mentation appears normal, affect normal/bright    Diagnostic Test Results:  Results for orders placed or performed in visit on 04/09/19 (from the past 24 hour(s))   HCG Qual, Urine ([id number])   Result Value Ref Range    HCG Qual Urine Negative NEG^Negative       ASSESSMENT/PLAN:     1. Infected sebaceous cyst  Continue antibiotics. Follow up with general surgery.  - GENERAL SURG ADULT REFERRAL    2. Umbilical hernia without obstruction and without gangrene  Follow up with general surgery to determine if interventions is needed.  - GENERAL SURG ADULT REFERRAL    3. Pregnancy examination or test, negative result  Negative in clinic. Not trying to conceive.  - HCG Qual, Urine ([id number])    See Patient Instructions    The benefits, risks and potential side effects were discussed in detail. Black box warnings discussed as relevant. All patient questions were answered. The patient was instructed to follow up immediately if any adverse reactions develop.    Return precautions discussed, including when to seek urgent/emergent care.    Patient verbalizes understanding and agrees with plan of care. Patient stable for discharge.      MAYANK Sawyer [facility]

## 2019-05-01 ENCOUNTER — OFFICE VISIT (OUTPATIENT)
Dept: FAMILY MEDICINE | Facility: CLINIC | Age: 39
End: 2019-05-01
Payer: COMMERCIAL

## 2019-05-01 VITALS
HEART RATE: 89 BPM | DIASTOLIC BLOOD PRESSURE: 86 MMHG | HEIGHT: 66 IN | OXYGEN SATURATION: 100 % | BODY MASS INDEX: 46.61 KG/M2 | TEMPERATURE: 98.3 F | WEIGHT: 290 LBS | SYSTOLIC BLOOD PRESSURE: 136 MMHG

## 2019-05-01 DIAGNOSIS — N60.81 SEBACEOUS CYST OF SKIN OF RIGHT BREAST: Primary | ICD-10-CM

## 2019-05-01 PROCEDURE — 99213 OFFICE O/P EST LOW 20 MIN: CPT | Performed by: NURSE PRACTITIONER

## 2019-05-01 ASSESSMENT — MIFFLIN-ST. JEOR: SCORE: 1999.24

## 2019-05-01 ASSESSMENT — PAIN SCALES - GENERAL: PAINLEVEL: NO PAIN (0)

## 2019-05-01 NOTE — PROGRESS NOTES
SUBJECTIVE:   Tiffany Sanz is a 38 year old female who presents to clinic today for the following   health issues:      Concern - Breast Pain  Onset: 1-2 weeks ago    Description:   Stabbing pain in right breast, feels like something is moving in nipple    Intensity: mild    Progression of Symptoms:  same    Accompanying Signs & Symptoms:  None    Previous history of similar problem:   yes    Precipitating factors:   Worsened by: None    Alleviating factors:  Improved by: None    Therapies Tried and outcome: None    Feels like something is poking her in the right breast where abscess was few weeks ago. No warmth or drainage. Has follow up with general surgery tomorrow        Additional history: as documented    Reviewed  and updated as needed this visit by clinical staff  Tobacco  Allergies  Meds  Problems  Med Hx  Surg Hx  Fam Hx  Soc Hx          Reviewed and updated as needed this visit by Provider  Tobacco  Allergies  Meds  Problems  Med Hx  Surg Hx  Fam Hx         Patient Active Problem List   Diagnosis     CARDIOVASCULAR SCREENING; LDL GOAL LESS THAN 160     Morbid obesity (H)     Iron deficiency anemia due to chronic blood loss     Subserous leiomyoma of uterus     Hyperlipidemia     Past Surgical History:   Procedure Laterality Date      SECTION  2017     HC INSERTION INTRAUTERINE DEVICE  2017    [facility]     HC REMOVE INTRAUTERINE DEVICE  2017       Social History     Tobacco Use     Smoking status: Never Smoker     Smokeless tobacco: Never Used   Substance Use Topics     Alcohol use: Yes     History reviewed. No pertinent family history.      Current Outpatient Medications   Medication Sig Dispense Refill     IRON PO        doxycycline monohydrate (ADOXA) 100 MG tablet Take 100 mg by mouth       hydrocortisone 2.5 % cream Apply BID to affected region(s) for 7-10 days. (Patient not taking: Reported on 2019) 30 g 0     Prenatal Vit-Fe Sulfate-FA (PRENATAL MULTIVIT-IRON PO)     "    triamcinolone (KENALOG) 0.1 % paste Take by mouth 2 times daily (Patient not taking: Reported on 4/9/2019) 5 g 1     vitamin D3 (CHOLECALCIFEROL) 35598 units capsule Take 1 capsule (50,000 Units) by mouth every 7 days (Patient not taking: Reported on 4/9/2019) 8 capsule 0     Allergies   Allergen Reactions     Nkda [No Known Drug Allergies]      Sulfa Drugs Itching     BP Readings from Last 3 Encounters:   05/01/19 136/86   04/09/19 135/89   04/03/19 (!) 169/101    Wt Readings from Last 3 Encounters:   05/01/19 131.5 kg (290 lb)   04/09/19 133.4 kg (294 lb)   04/03/19 132.5 kg (292 lb)                    ROS:  Constitutional, HEENT, cardiovascular, pulmonary, GI, , musculoskeletal, neuro, skin, endocrine and psych systems are negative, except as otherwise noted.    OBJECTIVE:     /86 (BP Location: Left arm)   Pulse 89   Temp 98.3  F (36.8  C) (Oral)   Ht 1.664 m (5' 5.5\")   Wt 131.5 kg (290 lb)   LMP 04/21/2019 (Exact Date)   SpO2 100%   Breastfeeding? No   BMI 47.52 kg/m    Body mass index is 47.52 kg/m .  GENERAL: healthy, alert and no distress  RESP: lungs clear to auscultation - no rales, rhonchi or wheezes  BREAST: normal without masses, tenderness or nipple discharge and no palpable axillary masses or adenopathy  CV: regular rate and rhythm, normal S1 S2, no S3 or S4, no murmur, click or rub, no peripheral edema and peripheral pulses strong  MS: no gross musculoskeletal defects noted, no edema  SKIN: no suspicious lesions or rashes  PSYCH: mentation appears normal, affect normal/bright    Diagnostic Test Results:  none     ASSESSMENT/PLAN:     1. Sebaceous cyst of skin of right breast  I don't appreciate any abnormality today. She has follow up with general surgeon tomorrow so will wait for their recommendations. Could consider US to see if lesion returning.       See Patient Instructions    The benefits, risks and potential side effects were discussed in detail. Black box warnings " discussed as relevant. All patient questions were answered. The patient was instructed to follow up immediately if any adverse reactions develop.    Return precautions discussed, including when to seek urgent/emergent care.    Patient verbalizes understanding and agrees with plan of care. Patient stable for discharge.      MAYANK Sawyer [facility]

## 2019-05-09 ENCOUNTER — TELEPHONE (OUTPATIENT)
Dept: SURGERY | Facility: CLINIC | Age: 39
End: 2019-05-09

## 2019-05-09 NOTE — TELEPHONE ENCOUNTER
Pre Visit Call and Assessment    Date of call:  2019    Phone numbers:  Home number on file [phone] (home)    Reached patient/confirmed appointment:  Yes  Patient care team/Primary provider:  No Ref-Primary, Physician  Preferred outpatient pharmacy:    [id number] - [place], [place] - [address] [facility] [address]  [address]  [facility] [address]  Phone: [phone] Fax: [phone]    [facility] [place], [place] - [address]  [address] [address]  Phone: [phone] Fax: [phone] Alternate Fax: [phone], [phone]    [id number] - [place], [place] - [address] [facility]  [address] [facility] [address]  Phone: [phone] Fax: [phone]    [facility] [place], [place] - [address] [facility] AT [facility]  [address] [facility] [address]  Phone: [phone] Fax: [phone]    Referred to:  Dr. Say Burnham    Reason for visit:  Hernia (Umbilical    Problem List:    Patient Active Problem List   Diagnosis     CARDIOVASCULAR SCREENING; LDL GOAL LESS THAN 160     Morbid obesity (H)     Iron deficiency anemia due to chronic blood loss     Subserous leiomyoma of uterus     Hyperlipidemia       Allergies:     Allergies   Allergen Reactions     Nkda [No Known Drug Allergies]      Sulfa Drugs Itching       History:      Past Medical History:   Diagnosis Date     Chlamydia 2016     Genital herpes      Hyperlipidemia 3/21/2019    Overview:  Created by Conversion     Morbid obesity (H) 2018       Past Surgical History:   Procedure Laterality Date      SECTION  2017     HC INSERTION INTRAUTERINE DEVICE  2017    [facility]     HC REMOVE INTRAUTERINE DEVICE  2017       No family history on file.    Social History     Tobacco Use     Smoking status: Never Smoker     Smokeless tobacco: Never Used   Substance Use Topics     Alcohol use: Yes

## 2019-05-10 ENCOUNTER — OFFICE VISIT (OUTPATIENT)
Dept: SURGERY | Facility: CLINIC | Age: 39
End: 2019-05-10
Attending: NURSE PRACTITIONER
Payer: COMMERCIAL

## 2019-05-10 VITALS
DIASTOLIC BLOOD PRESSURE: 81 MMHG | WEIGHT: 293 LBS | OXYGEN SATURATION: 99 % | SYSTOLIC BLOOD PRESSURE: 139 MMHG | BODY MASS INDEX: 47.09 KG/M2 | HEART RATE: 86 BPM | HEIGHT: 66 IN

## 2019-05-10 DIAGNOSIS — K42.9 UMBILICAL HERNIA WITHOUT OBSTRUCTION AND WITHOUT GANGRENE: Primary | ICD-10-CM

## 2019-05-10 ASSESSMENT — MIFFLIN-ST. JEOR: SCORE: 2015.57

## 2019-05-10 ASSESSMENT — PAIN SCALES - GENERAL: PAINLEVEL: NO PAIN (0)

## 2019-05-10 NOTE — PATIENT INSTRUCTIONS
You met with Dr. Say Burnham.      Today's visit instructions:    Dr. Say Burnham would like you to lose weight to a BMI of 30 and maintain this lifestyle for at least 6 months prior to considering hernia surgery.  Please return to the clinic when these goals have been achieved for a follow up visit and to discuss surgery.  Please call [phone] to arrange an appointment.  If you would like a referral for Medical Weight Management or smoking cessation please contact our office at [phone].   If you have questions please contact Bakari RN or Esperanza RN during regular clinic hours, Monday through Friday 7:30 AM - 4:00 PM, or you can contact us via [employer] at anytime.       If you have urgent needs after-hours, weekends, or holidays please call the hospital at [phone] and ask to speak with our on-call General Surgery Team.    Appointment [phone], option #1   Nurse Advice (Bakari or Esperanza): [phone]   Surgery Scheduler ([place]): [phone]  Fax: [phone]

## 2019-05-10 NOTE — LETTER
5/10/2019     RE: Tiffany Sanz  [address]  [facility] [address]     Dear Colleague,    Thank you for referring your patient, Tiffany Sanz, to the [facility] GENERAL SURGERY at [facility]. Please see a copy of my visit note below.    Tiffany Sanz is a 38 year old female with a many  year history of an umbilical hernia with the following symptoms of lump.    Finding was not work related.  Onset did not occur with lifting.  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    Past medical history, medications, allergies, family history, and social history were reviewed with the patient.    Past Medical History:   Diagnosis Date     Chlamydia 2016     Genital herpes      Hyperlipidemia 3/21/2019    Overview:  Created by Conversion     Morbid obesity (H) 2018     Past Surgical History:   Procedure Laterality Date      SECTION  2017     HC INSERTION INTRAUTERINE DEVICE  2017    [facility]     HC REMOVE INTRAUTERINE DEVICE  2017       ROS: 10 point review of systems negative except noted in HPI  PHYSICAL EXAM  General appearance- healthy, alert, and in no distress.  Skin- Skin color and turgor normal.  No obvious rashes.  Neck- Neck is supple without obvious adenopathy.  Lungs- Respiratory effort unlabored.  Gait- Normal.  BMI 48  Abdomen - soft non distended, non tender with periumbilical mass unable to evaluate fascial defect size.  Impression: umbilical (possible incisional) hernia in obese patient. At this point risks of surgery out weigh benefits.  Recommend: weight management referral to help return weight closer to BMI 30. Once there can reconsider surgical repair.   Use of binder also encouraged.  The total time spent with this patient was 30 minutes.  Of this time, greater than 50% was spent counseling and coordinating care.      Again,  thank you for allowing me to participate in the care of your patient.      Sincerely,    Say Burnham MD

## 2019-05-10 NOTE — NURSING NOTE
"Chief Complaint   Patient presents with     New Patient     New hernia consult       Vitals:    05/10/19 1344   BP: 156/90   Pulse: 86   SpO2: 99%   Weight: 133.2 kg (293 lb 9.6 oz)   Height: 1.664 m (5' 5.5\")       Body mass index is 48.11 kg/m .    Kathie Regan CMA    "

## 2019-05-10 NOTE — PROGRESS NOTES
Tiffany Sanz is a 38 year old female with a many  year history of an umbilical hernia with the following symptoms of lump.    Finding was not work related.  Onset did not occur with lifting.  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    Past medical history, medications, allergies, family history, and social history were reviewed with the patient.    Past Medical History:   Diagnosis Date     Chlamydia 2016     Genital herpes      Hyperlipidemia 3/21/2019    Overview:  Created by Conversion     Morbid obesity (H) 2018     Past Surgical History:   Procedure Laterality Date      SECTION  2017     HC INSERTION INTRAUTERINE DEVICE  2017    [facility]     HC REMOVE INTRAUTERINE DEVICE  2017       ROS: 10 point review of systems negative except noted in HPI  PHYSICAL EXAM  General appearance- healthy, alert, and in no distress.  Skin- Skin color and turgor normal.  No obvious rashes.  Neck- Neck is supple without obvious adenopathy.  Lungs- Respiratory effort unlabored.  Gait- Normal.  BMI 48  Abdomen - soft non distended, non tender with periumbilical mass unable to evaluate fascial defect size.    Impression: umbilical (possible incisional) hernia in obese patient. At this point risks of surgery out weigh benefits.  Recommend: weight management referral to help return weight closer to BMI 30. Once there can reconsider surgical repair.   Use of binder also encouraged.  The total time spent with this patient was 30 minutes.  Of this time, greater than 50% was spent counseling and coordinating care.

## 2019-05-21 ENCOUNTER — OFFICE VISIT (OUTPATIENT)
Dept: URGENT CARE | Facility: URGENT CARE | Age: 39
End: 2019-05-21
Payer: COMMERCIAL

## 2019-05-21 VITALS
TEMPERATURE: 98.5 F | SYSTOLIC BLOOD PRESSURE: 139 MMHG | DIASTOLIC BLOOD PRESSURE: 81 MMHG | OXYGEN SATURATION: 99 % | HEART RATE: 89 BPM | RESPIRATION RATE: 18 BRPM | BODY MASS INDEX: 47.85 KG/M2 | WEIGHT: 292 LBS

## 2019-05-21 DIAGNOSIS — N64.4 BREAST PAIN, RIGHT: Primary | ICD-10-CM

## 2019-05-21 PROCEDURE — 99214 OFFICE O/P EST MOD 30 MIN: CPT | Performed by: NURSE PRACTITIONER

## 2019-05-21 ASSESSMENT — ENCOUNTER SYMPTOMS
RHINORRHEA: 0
VOMITING: 0
HEADACHES: 0
SORE THROAT: 0
SHORTNESS OF BREATH: 0
FEVER: 0
DIARRHEA: 0
COUGH: 0
NAUSEA: 0
CHILLS: 1

## 2019-05-21 NOTE — PROGRESS NOTES
SUBJECTIVE:   Tiffany Sanz is a 38 year old female presenting with a chief complaint of   Chief Complaint   Patient presents with     Chills     hx of staph infection recently.       She is an established patient of [facility].    Ms Suárez is here with the complaints of right breast pain and chills.  She reports that she feels a pulling inside the breast which she is sometimes painful.  This patient was treated with infected sebaceous cyst of the right breast in 4/3/2019. On 04/06/2019 there was worsening pain on the right breast and shee went to the ER where they drained the cyst.  Patient returned back on 4/9/2019 for recheck of the breast and she continued to take her Keflex and doxycycline at that time the right breast was improving.  She was given a referral to see a general surgery.  On 05/10/2019 was seen by general surgeon but patient states nothing was done at that time.  Today she reports that the right breast cyst has healed but there is pooling and pain inside the breast.      Review of Systems   Constitutional: Positive for chills. Negative for fever.   HENT: Negative for congestion, ear pain, rhinorrhea and sore throat.    Respiratory: Negative for cough and shortness of breath.    Gastrointestinal: Negative for diarrhea, nausea and vomiting.   Skin:        Right breast pain and pulling   Neurological: Negative for headaches.   All other systems reviewed and are negative.      Past Medical History:   Diagnosis Date     Chlamydia 2016     Genital herpes      Hyperlipidemia 3/21/2019    Overview:  Created by Conversion     Morbid obesity (H) 9/6/2018     History reviewed. No pertinent family history.  Current Outpatient Medications   Medication Sig Dispense Refill     IRON PO        doxycycline monohydrate (ADOXA) 100 MG tablet Take 100 mg by mouth       hydrocortisone 2.5 % cream Apply BID to affected region(s) for 7-10 days. (Patient not taking: Reported on 2/26/2019) 30 g 0     Prenatal Vit-Fe  Sulfate-FA (PRENATAL MULTIVIT-IRON PO)        triamcinolone (KENALOG) 0.1 % paste Take by mouth 2 times daily (Patient not taking: Reported on 4/9/2019) 5 g 1     vitamin D3 (CHOLECALCIFEROL) 58320 units capsule Take 1 capsule (50,000 Units) by mouth every 7 days (Patient not taking: Reported on 4/9/2019) 8 capsule 0     Social History     Tobacco Use     Smoking status: Never Smoker     Smokeless tobacco: Never Used   Substance Use Topics     Alcohol use: Yes       OBJECTIVE  /81 (BP Location: Left arm, Patient Position: Chair, Cuff Size: Adult Large)   Pulse 89   Temp 98.5  F (36.9  C) (Oral)   Resp 18   Wt 132.5 kg (292 lb)   LMP 04/21/2019 (Exact Date)   SpO2 99%   BMI 47.85 kg/m      Physical Exam   Constitutional: No distress.   HENT:   Head: Normocephalic and atraumatic.   Right Ear: Tympanic membrane and external ear normal.   Left Ear: Tympanic membrane and external ear normal.   Mouth/Throat: Oropharynx is clear and moist.   Eyes: Pupils are equal, round, and reactive to light. EOM are normal.   Neck: Normal range of motion. Neck supple.   Cardiovascular: Normal rate and normal heart sounds.   Pulmonary/Chest: Effort normal and breath sounds normal. No respiratory distress.   Abdominal: Soft. Bowel sounds are normal.   Musculoskeletal: Normal range of motion.   Lymphadenopathy:     She has no cervical adenopathy.   Neurological: She is alert. No cranial nerve deficit.   Skin: Skin is warm and dry. She is not diaphoretic.   Examination of the right breast reveals a healed skin scar about 1 inch to the left of right breast.  No palpable masses felt.  Breast sizes on both sides have the same.  No nipple discharge or discoloration on the breast.   Psychiatric: She has a normal mood and affect.   Nursing note and vitals reviewed.      ASSESSMENT:      ICD-10-CM    1. Breast pain, right N64.4           PLAN:  The patient is concerned that something wrong is going on inside the right breast.  So far  the cyst has healed.  I will do a referral to her PCP for further evaluation.  This has been discussed with the patient and we are making an appointment to be seen in a week.      Patient Instructions     Patient Education     Breast Anatomy    Breasts come in all shapes and sizes. But they all share the same features. You can learn about the parts, or anatomy, of your breasts. This will help you know what you're seeing and feeling. Then you can learn what's normal for your breasts.     The areola is a dark [place] of skin that surrounds the nipple.  The nipple is the outlet for milk during breastfeeding.  Fibrous tissue supports your breasts, making them feel firm.  Lobules (mammary glands) produce milk during pregnancy and breastfeeding.  Ducts carry milk from the lobules during breastfeeding.  Fatty tissue fills the spaces around the ducts and lobules.  Axillary lymph nodes filter lymph fluid from your breast and help your body fight infection.  Ribs can be felt beneath the skin.  The clavicle (collarbone) marks the upper boundary of the breast tissue.  The sternum (breastbone) can be felt beneath the skin.  Chest muscles help move your arm.  Date Last Reviewed: 10/1/2017    2719-1946 [employer]. [address] [place] [address], [place] [address]. All rights reserved. This information is not intended as a substitute for professional medical care. Always follow your healthcare professional's instructions.

## 2019-05-21 NOTE — PATIENT INSTRUCTIONS
Patient Education     Breast Anatomy    Breasts come in all shapes and sizes. But they all share the same features. You can learn about the parts, or anatomy, of your breasts. This will help you know what you're seeing and feeling. Then you can learn what's normal for your breasts.     The areola is a dark [place] of skin that surrounds the nipple.  The nipple is the outlet for milk during breastfeeding.  Fibrous tissue supports your breasts, making them feel firm.  Lobules (mammary glands) produce milk during pregnancy and breastfeeding.  Ducts carry milk from the lobules during breastfeeding.  Fatty tissue fills the spaces around the ducts and lobules.  Axillary lymph nodes filter lymph fluid from your breast and help your body fight infection.  Ribs can be felt beneath the skin.  The clavicle (collarbone) marks the upper boundary of the breast tissue.  The sternum (breastbone) can be felt beneath the skin.  Chest muscles help move your arm.  Date Last Reviewed: 10/1/2017    5951-8624 [employer] [address] [place] [address], [place] [address]. All rights reserved. This information is not intended as a substitute for professional medical care. Always follow your healthcare professional's instructions.

## 2019-05-24 ENCOUNTER — OFFICE VISIT (OUTPATIENT)
Dept: FAMILY MEDICINE | Facility: CLINIC | Age: 39
End: 2019-05-24
Payer: COMMERCIAL

## 2019-05-24 VITALS
TEMPERATURE: 99 F | HEART RATE: 92 BPM | RESPIRATION RATE: 20 BRPM | DIASTOLIC BLOOD PRESSURE: 70 MMHG | BODY MASS INDEX: 47.09 KG/M2 | HEIGHT: 66 IN | OXYGEN SATURATION: 100 % | WEIGHT: 293 LBS | SYSTOLIC BLOOD PRESSURE: 120 MMHG

## 2019-05-24 DIAGNOSIS — E66.01 MORBID OBESITY (H): ICD-10-CM

## 2019-05-24 DIAGNOSIS — D50.0 IRON DEFICIENCY ANEMIA DUE TO CHRONIC BLOOD LOSS: ICD-10-CM

## 2019-05-24 DIAGNOSIS — Z86.32 H/O GESTATIONAL DIABETES IN PRIOR PREGNANCY, CURRENTLY PREGNANT: ICD-10-CM

## 2019-05-24 DIAGNOSIS — Z32.01 PREGNANCY CONFIRMED BY POSITIVE URINE TEST: Primary | ICD-10-CM

## 2019-05-24 DIAGNOSIS — O09.299 H/O GESTATIONAL DIABETES IN PRIOR PREGNANCY, CURRENTLY PREGNANT: ICD-10-CM

## 2019-05-24 LAB — HCG UR QL: POSITIVE

## 2019-05-24 PROCEDURE — 81025 URINE PREGNANCY TEST: CPT | Performed by: PHYSICIAN ASSISTANT

## 2019-05-24 PROCEDURE — 99213 OFFICE O/P EST LOW 20 MIN: CPT | Performed by: PHYSICIAN ASSISTANT

## 2019-05-24 RX ORDER — PRENATAL VIT/IRON FUM/FOLIC AC 27MG-0.8MG
1 TABLET ORAL DAILY
Qty: 90 TABLET | Refills: 3 | Status: SHIPPED | OUTPATIENT
Start: 2019-05-24 | End: 2019-07-02

## 2019-05-24 ASSESSMENT — PAIN SCALES - GENERAL: PAINLEVEL: NO PAIN (0)

## 2019-05-24 ASSESSMENT — MIFFLIN-ST. JEOR: SCORE: 2014.66

## 2019-05-24 NOTE — PROGRESS NOTES
Subjective     Tiffany Sanz is a 38 year old female who presents to clinic today for the following health issues:    HPI   Patient is here today to confirm pregnancy. Patient states that she took two tests at home and they were both positive.        Patient Active Problem List   Diagnosis     CARDIOVASCULAR SCREENING; LDL GOAL LESS THAN 160     Morbid obesity (H)     Iron deficiency anemia due to chronic blood loss     Subserous leiomyoma of uterus     Hyperlipidemia     H/O gestational diabetes in prior pregnancy, currently pregnant     Past Surgical History:   Procedure Laterality Date      SECTION  2017     HC INSERTION INTRAUTERINE DEVICE  2017    [facility]     HC REMOVE INTRAUTERINE DEVICE  2017       Social History     Tobacco Use     Smoking status: Never Smoker     Smokeless tobacco: Never Used   Substance Use Topics     Alcohol use: Yes     Family History   Family history unknown: Yes         Current Outpatient Medications   Medication Sig Dispense Refill     IRON PO        Prenatal Vit-Fe Fumarate-FA (PRENATAL MULTIVITAMIN W/IRON) 27-0.8 MG tablet Take 1 tablet by mouth daily 90 tablet 3     hydrocortisone 2.5 % cream Apply BID to affected region(s) for 7-10 days. (Patient not taking: Reported on 2019) 30 g 0     triamcinolone (KENALOG) 0.1 % paste Take by mouth 2 times daily (Patient not taking: Reported on 2019) 5 g 1     vitamin D3 (CHOLECALCIFEROL) 13063 units capsule Take 1 capsule (50,000 Units) by mouth every 7 days (Patient not taking: Reported on 2019) 8 capsule 0     Allergies   Allergen Reactions     Sulfa Drugs Itching     Reviewed and updated as needed this visit by Provider  Tobacco  Allergies  Meds  Problems  Med Hx  Surg Hx  Fam Hx       Review of Systems   ROS COMP: Constitutional, HEENT, cardiovascular, pulmonary, gi and gu systems are negative, except as otherwise noted.      Objective    /70 (BP Location: Right arm, Patient Position: Sitting, Cuff  "Size: Adult Large)   Pulse 92   Temp 99  F (37.2  C) (Oral)   Resp 20   Ht 1.664 m (5' 5.5\")   Wt 133.1 kg (293 lb 6.4 oz)   LMP 03/31/2019   SpO2 100%   Breastfeeding? No   BMI 48.08 kg/m    Body mass index is 48.08 kg/m .  Physical Exam   GENERAL: alert, no distress and obese  NECK: no adenopathy, no asymmetry, masses, or scars and thyroid normal to palpation  RESP: lungs clear to auscultation - no rales, rhonchi or wheezes  CV: regular rate and rhythm, normal S1 S2, no S3 or S4, no murmur, click or rub, no peripheral edema and peripheral pulses strong  ABDOMEN: soft, nontender, no hepatosplenomegaly, no masses and bowel sounds normal  MS: no gross musculoskeletal defects noted, no edema    Diagnostic Test Results:  Results for orders placed or performed in visit on 05/24/19 (from the past 24 hour(s))   HCG Qual, Urine ([id number])   Result Value Ref Range    HCG Qual Urine Positive (A) NEG^Negative           Assessment & Plan       ICD-10-CM    1. Pregnancy confirmed by positive urine test Z32.01 HCG Qual, Urine ([id number])     OB/GYN REFERRAL     Prenatal Vit-Fe Fumarate-FA (PRENATAL MULTIVITAMIN W/IRON) 27-0.8 MG tablet   2. H/O gestational diabetes in prior pregnancy, currently pregnant O09.299 OB/GYN REFERRAL    Z86.32    3. Morbid obesity (H) E66.01    4. Iron deficiency anemia due to chronic blood loss D50.0       LMP 3/31/19  EGA 7 weeks 5 days  DAVID 1/5/2020  Make appointment with OBGYN as soon as possible   Start prenatal vitamins,       Patient has h/o iron def anemia due to fibroids. Since patient isn't bleeding during pregnancy the risk is minimized. Patient will continue iron tablets but only 1 per day vs 3     BMI:   Estimated body mass index is 48.08 kg/m  as calculated from the following:    Height as of this encounter: 1.664 m (5' 5.5\").    Weight as of this encounter: 133.1 kg (293 lb 6.4 oz).   Weight management plan: Discussed healthy diet and exercise guidelines  Declined nutrition " referral today, will follow up with OBGYN           Return in about 1 week (around 5/31/2019) for OBGYN.    Jazmyne Tavares PA-C  [facility]

## 2019-05-24 NOTE — PATIENT INSTRUCTIONS
LMP 3/31/19  EGA 7 weeks 5 days  DAVID 1/5/2020    Make appointment with OBGYN as soon as possible     Start prenatal vitamins  Patient Education     Healthy Eating Habits During Pregnancy    It s important to develop healthy eating habits while you are pregnant, for you as well as for your baby. Here are some ways to stay healthy.  Aim for a healthy weight  A slow, steady rate of weight gain is often best. After the first trimester, you may gain about a pound a week. If you were overweight before pregnancy, you need to gain fewer pounds. Your healthcare provider can give you a healthy weight goal for your pregnancy.  Don t diet  Now is not the time to diet. You may not get enough of the nutrients you and your baby need. Instead, learn how to be a healthy eater. Start by doing it for your baby. Soon, you may do it for yourself.  Vitamins and supplements  Talk with your healthcare provider about taking these and other prenatal vitamins and supplements.    Iron makes the extra blood you need now.    Calcium and vitamin D help build and keep strong bones.    Folic acid helps prevent certain birth defects.    Iodine helps the thyroid work right.    Some vitamins may not be safe to take. Your healthcare provider will tell you which ones to avoid.  Fluids  Drink at least 8 to 10 cups of fluid daily. Your baby needs fluids. Fluids also decrease constipation, flush out toxins and waste, limit swelling, and help prevent bladder infections. Water is best. Other good choices are:    Water or seltzer water with a slice of lemon or lime (These can also help ease an upset stomach.)    Clear soups that are low in salt    Low-fat or fat-free milk, soy or rice milk with calcium added    Popsicles or gelatin  Things to avoid  Some things might harm your growing baby. Don t eat or drink:    Alcohol    Unpasteurized dairy foods and juices    Raw or undercooked meat, poultry, fish, or eggs    Unwashed fruits and vegetables    Prepared  meats, like deli meats or hot dogs, unless heated until steaming hot    Fish that are high in mercury, like shark, swordfish, cori mackerel, tilefish, and albacore tuna  Things to limit  Ask your healthcare provider whether it s safe to eat or drink:    Caffeine    Artificial sweeteners    Organ meats    Certain types of fish    Fish and shellfish that contain mercury in lower amounts, like shrimp, canned light tuna, salmon, pollock, and catfish  Date Last Reviewed: 2/1/2018 2000-2018 [employer]. [address] [place] [address]. All rights reserved. This information is not intended as a substitute for professional medical care. Always follow your healthcare professional's instructions.

## 2019-06-04 ENCOUNTER — PRENATAL OFFICE VISIT (OUTPATIENT)
Dept: OBGYN | Facility: CLINIC | Age: 39
End: 2019-06-04
Payer: COMMERCIAL

## 2019-06-04 VITALS
OXYGEN SATURATION: 97 % | HEART RATE: 87 BPM | WEIGHT: 292.6 LBS | HEIGHT: 66 IN | SYSTOLIC BLOOD PRESSURE: 134 MMHG | BODY MASS INDEX: 47.02 KG/M2 | DIASTOLIC BLOOD PRESSURE: 80 MMHG

## 2019-06-04 DIAGNOSIS — O34.219 PREVIOUS CESAREAN DELIVERY, ANTEPARTUM CONDITION OR COMPLICATION: ICD-10-CM

## 2019-06-04 DIAGNOSIS — D50.0 IRON DEFICIENCY ANEMIA DUE TO CHRONIC BLOOD LOSS: ICD-10-CM

## 2019-06-04 DIAGNOSIS — O09.529 ANTEPARTUM MULTIGRAVIDA OF ADVANCED MATERNAL AGE: ICD-10-CM

## 2019-06-04 DIAGNOSIS — E66.01 MORBID OBESITY (H): Primary | ICD-10-CM

## 2019-06-04 LAB
BASOPHILS # BLD AUTO: 0 10E9/L (ref 0–0.2)
BASOPHILS NFR BLD AUTO: 0.3 %
DIFFERENTIAL METHOD BLD: ABNORMAL
EOSINOPHIL # BLD AUTO: 0.1 10E9/L (ref 0–0.7)
EOSINOPHIL NFR BLD AUTO: 1.4 %
ERYTHROCYTE [DISTWIDTH] IN BLOOD BY AUTOMATED COUNT: 17.8 % (ref 10–15)
HBA1C MFR BLD: 5.3 % (ref 0–5.6)
HCT VFR BLD AUTO: 34.6 % (ref 35–47)
HGB BLD-MCNC: 10.3 G/DL (ref 11.7–15.7)
IMM GRANULOCYTES # BLD: 0 10E9/L (ref 0–0.4)
IMM GRANULOCYTES NFR BLD: 0.3 %
LYMPHOCYTES # BLD AUTO: 1.4 10E9/L (ref 0.8–5.3)
LYMPHOCYTES NFR BLD AUTO: 19.1 %
MCH RBC QN AUTO: 23.8 PG (ref 26.5–33)
MCHC RBC AUTO-ENTMCNC: 29.8 G/DL (ref 31.5–36.5)
MCV RBC AUTO: 80 FL (ref 78–100)
MONOCYTES # BLD AUTO: 0.7 10E9/L (ref 0–1.3)
MONOCYTES NFR BLD AUTO: 9.2 %
NEUTROPHILS # BLD AUTO: 4.9 10E9/L (ref 1.6–8.3)
NEUTROPHILS NFR BLD AUTO: 69.7 %
PLATELET # BLD AUTO: 179 10E9/L (ref 150–450)
RBC # BLD AUTO: 4.32 10E12/L (ref 3.8–5.2)
WBC # BLD AUTO: 7.1 10E9/L (ref 4–11)

## 2019-06-04 PROCEDURE — 36415 COLL VENOUS BLD VENIPUNCTURE: CPT | Performed by: OBSTETRICS & GYNECOLOGY

## 2019-06-04 PROCEDURE — 86850 RBC ANTIBODY SCREEN: CPT | Performed by: OBSTETRICS & GYNECOLOGY

## 2019-06-04 PROCEDURE — 87088 URINE BACTERIA CULTURE: CPT | Performed by: OBSTETRICS & GYNECOLOGY

## 2019-06-04 PROCEDURE — 87491 CHLMYD TRACH DNA AMP PROBE: CPT | Performed by: OBSTETRICS & GYNECOLOGY

## 2019-06-04 PROCEDURE — 87591 N.GONORRHOEAE DNA AMP PROB: CPT | Performed by: OBSTETRICS & GYNECOLOGY

## 2019-06-04 PROCEDURE — 87186 SC STD MICRODIL/AGAR DIL: CPT | Performed by: OBSTETRICS & GYNECOLOGY

## 2019-06-04 PROCEDURE — 86780 TREPONEMA PALLIDUM: CPT | Performed by: OBSTETRICS & GYNECOLOGY

## 2019-06-04 PROCEDURE — 87086 URINE CULTURE/COLONY COUNT: CPT | Performed by: OBSTETRICS & GYNECOLOGY

## 2019-06-04 PROCEDURE — 86901 BLOOD TYPING SEROLOGIC RH(D): CPT | Performed by: OBSTETRICS & GYNECOLOGY

## 2019-06-04 PROCEDURE — 86762 RUBELLA ANTIBODY: CPT | Performed by: OBSTETRICS & GYNECOLOGY

## 2019-06-04 PROCEDURE — 86900 BLOOD TYPING SEROLOGIC ABO: CPT | Performed by: OBSTETRICS & GYNECOLOGY

## 2019-06-04 PROCEDURE — 85025 COMPLETE CBC W/AUTO DIFF WBC: CPT | Performed by: OBSTETRICS & GYNECOLOGY

## 2019-06-04 PROCEDURE — 87340 HEPATITIS B SURFACE AG IA: CPT | Performed by: OBSTETRICS & GYNECOLOGY

## 2019-06-04 PROCEDURE — 83036 HEMOGLOBIN GLYCOSYLATED A1C: CPT | Performed by: OBSTETRICS & GYNECOLOGY

## 2019-06-04 PROCEDURE — 87389 HIV-1 AG W/HIV-1&-2 AB AG IA: CPT | Performed by: OBSTETRICS & GYNECOLOGY

## 2019-06-04 ASSESSMENT — MIFFLIN-ST. JEOR: SCORE: 2016.22

## 2019-06-04 ASSESSMENT — PATIENT HEALTH QUESTIONNAIRE - PHQ9: SUM OF ALL RESPONSES TO PHQ QUESTIONS 1-9: 4

## 2019-06-04 NOTE — PATIENT INSTRUCTIONS
If you have any questions regarding your visit, Please contact your care team.  [employer] [facility]: [phone]  [facility] CLINIC HOURS TELEPHONE NUMBER   Cephas Agbeh, M.D. Lisa -       Malgorzata Linn         Monday-[place]    8:00a.m-4:45 p.m    Tuesday--[place]     8:00a.m-4:45 p.m.    Thursday-[place]    8:00a.m-4:45 p.m.    Friday-[place]    8:00a.m-4:45 p.m    [facility]   [address] [place] [address]   [phone]-Ask for [facility]   Fax [phone]   Oqacofc-[phone]     [facility]   [address] [place] [address] [place] [address]   [phone]    [facility]  [address] [facility] [address]  [phone]  [id number]-[phone]   Urgent Care locations:    [facility] Monday-Friday  5 pm - 9 pm  Saturday and Sunday   9 am - 5 pm   Monday-Friday   5 pm - 9 pm  Saturday and Sunday  9 am - 5 pm    [phone]   If you need a medication refill, please contact your pharmacy. Please allow 3 business days for your refill to be completed.  As always, Thank you for trusting us with your healthcare needs!

## 2019-06-04 NOTE — PROGRESS NOTES
Tiffany is a 39 year old  @  9w2d weeks here for new ob visit.    H/O chronic anemia. She dis not comply with iron infusion ordered by Dr. Barclay.  Pregnancy unplanned but not on contraception.  Last delivery was by C/S . Induction for GDM2( insulin) and HTN Records to be sent for.    Will discuss TOLAC vs Repeat C/S pending operative records.    See Ob questionnaire for pertinent components of HPI.  Current Issues include: Sab symptoms:  none    OB History    Para Term  AB Living   4 2 2 0 1 2   SAB TAB Ectopic Multiple Live Births   1 0 0 0 2      # Outcome Date GA Lbr Roque/2nd Weight Sex Delivery Anes PTL Lv   4 Current            3 Term 2017    M -SEC   DAVID      Birth Comments: transfusion PRBC      Complications: Gestational diabetes mellitus (GDM), antepartum, Postpartum hemorrhage      Name: Loki   2 Term     M Vag-Spont   DAVID      Birth Comments: transfusion PRBC      Complications: Postpartum hemorrhage      Name: Amir   1 SAB      SAB          Past Surgical History:   Procedure Laterality Date      SECTION  2017     HC INSERTION INTRAUTERINE DEVICE  2017    [facility]     HC REMOVE INTRAUTERINE DEVICE       Past Medical History:   Diagnosis Date     Chlamydia 2016     Genital herpes      Hyperlipidemia 3/21/2019    Overview:  Created by Conversion     Morbid obesity (H) 2018     Past Surgical History:   Procedure Laterality Date      SECTION  2017     HC INSERTION INTRAUTERINE DEVICE  2017    [facility]     HC REMOVE INTRAUTERINE DEVICE       Patient Active Problem List    Diagnosis Date Noted     Antepartum multigravida of advanced maternal age 2019     Priority: Medium     H/O gestational diabetes in prior pregnancy, currently pregnant 2019     Priority: Medium     Iron deficiency anemia due to chronic blood loss 2019     Priority: Medium     No Show for IV iron       Subserous leiomyoma of uterus 2019     Priority: Medium      "Hyperlipidemia 2019     Priority: Medium     Overview:   Created by Conversion       Morbid obesity (H) 2018     Priority: Medium     CARDIOVASCULAR SCREENING; LDL GOAL LESS THAN 160 10/31/2010     Priority: Medium        Allergies   Allergen Reactions     Sulfa Drugs Itching     Current Outpatient Medications   Medication Sig Dispense Refill     IRON PO        Prenatal Vit-Fe Fumarate-FA (PRENATAL MULTIVITAMIN W/IRON) 27-0.8 MG tablet Take 1 tablet by mouth daily 90 tablet 3     hydrocortisone 2.5 % cream Apply BID to affected region(s) for 7-10 days. (Patient not taking: Reported on 2019) 30 g 0     triamcinolone (KENALOG) 0.1 % paste Take by mouth 2 times daily (Patient not taking: Reported on 2019) 5 g 1     vitamin D3 (CHOLECALCIFEROL) 10935 units capsule Take 1 capsule (50,000 Units) by mouth every 7 days (Patient not taking: Reported on 2019) 8 capsule 0       Past Medical History of Father of Baby:   No significant medical history    Physical Exam: /80   Pulse 87   Ht 1.68 m (5' 6.14\")   Wt 132.7 kg (292 lb 9.6 oz)   LMP 2019 (Approximate)   SpO2 97%   Breastfeeding? No   BMI 47.02 kg/m    General: Obese  Skin: Normal  HEENT: Normal  Neck: Supple,no adenopathy,thyroid normal  Chest: Clear  Heart: Regular rate, rhythm,No murmur, rub, gallop  Breasts: No masses, skin, nipple or axillary changes   Abdomen:MILLINE INCISION. Benign,Soft, flat, non-tender,No masses, organomegaly,No inguinal nodes,Bowel sounds normoactive   Extremities: Normal  Neurological: Normal   Perineum: Intact   Vulva: Normal  Vagina: Normal mucosa, no discharge  Cervix: Parous, closed, mobile, no discharge  Uterus: 9 weeks, Normal shape, position and consistency   Adnexa: Normal  Rectum: deferred, Normal without lesion or mass   Bony Pelvis: Adequate       A/P 39 year old  at  9w2d weeks    ICD-10-CM    1. Morbid obesity (H) E66.01 ABO/Rh type and screen     CBC with platelets differential "     Chlamydia trachomatis PCR     Neisseria gonorrhoeae PCR     Hepatitis B surface antigen     HIV Antigen Antibody Combo     Rubella Antibody IgG Quantitative     Treponema Abs w Reflex to RPR and Titer     Urine Culture Aerobic Bacterial     Hemoglobin A1c     US OB >14 Weeks Follow Up   2. Antepartum multigravida of advanced maternal age O09.529 ABO/Rh type and screen     CBC with platelets differential     Chlamydia trachomatis PCR     Neisseria gonorrhoeae PCR     Hepatitis B surface antigen     HIV Antigen Antibody Combo     Rubella Antibody IgG Quantitative     Treponema Abs w Reflex to RPR and Titer     Urine Culture Aerobic Bacterial     Hemoglobin A1c     US OB >14 Weeks Follow Up   3. Iron deficiency anemia due to chronic blood loss D50.0 ABO/Rh type and screen     CBC with platelets differential     Chlamydia trachomatis PCR     Neisseria gonorrhoeae PCR     Hepatitis B surface antigen     HIV Antigen Antibody Combo     Rubella Antibody IgG Quantitative     Treponema Abs w Reflex to RPR and Titer     Urine Culture Aerobic Bacterial     Hemoglobin A1c     US OB >14 Weeks Follow Up   4. Previous  delivery, antepartum condition or complication O34.219 ABO/Rh type and screen     CBC with platelets differential     Chlamydia trachomatis PCR     Neisseria gonorrhoeae PCR     Hepatitis B surface antigen     HIV Antigen Antibody Combo     Rubella Antibody IgG Quantitative     Treponema Abs w Reflex to RPR and Titer     Urine Culture Aerobic Bacterial     Hemoglobin A1c     US OB >14 Weeks Follow Up       - Discussed physician coverage, tertiary support, diet, exercise, weight gain, schedule of visits, routine and indicated ultrasounds, and childbirth education.    - Options for  testing for chromosomal and birth defects were discussed with the patient.  Diagnostic tests include CVS and Amniocentesis.  We discussed that these tests are definitive but invasive and do carry a risk of fetal loss.     Screening tests include nuchal translucency/blood marker testing in the first trimester and quad screening in the second trimester.  We discussed that these are screening tests and not diagnostic tests and that false positives and negatives are a distinct possibility.  .  MFM referral. She will decide on first trimester testing later.      return to clinic in 4 weeks.    CEPHAS AGBEH, MD.

## 2019-06-05 LAB
ABO + RH BLD: NORMAL
ABO + RH BLD: NORMAL
BLD GP AB SCN SERPL QL: NORMAL
BLOOD BANK CMNT PATIENT-IMP: NORMAL
C TRACH DNA SPEC QL NAA+PROBE: NEGATIVE
HBV SURFACE AG SERPL QL IA: NONREACTIVE
HIV 1+2 AB+HIV1 P24 AG SERPL QL IA: NONREACTIVE
N GONORRHOEA DNA SPEC QL NAA+PROBE: NEGATIVE
RUBV IGG SERPL IA-ACNC: 24 IU/ML
SPECIMEN EXP DATE BLD: NORMAL
SPECIMEN SOURCE: NORMAL
SPECIMEN SOURCE: NORMAL
T PALLIDUM AB SER QL: NONREACTIVE

## 2019-06-08 LAB
BACTERIA SPEC CULT: ABNORMAL
BACTERIA SPEC CULT: ABNORMAL
SPECIMEN SOURCE: ABNORMAL

## 2019-06-11 ENCOUNTER — ANCILLARY PROCEDURE (OUTPATIENT)
Dept: ULTRASOUND IMAGING | Facility: CLINIC | Age: 39
End: 2019-06-11
Attending: OBSTETRICS & GYNECOLOGY
Payer: COMMERCIAL

## 2019-06-11 DIAGNOSIS — E66.01 MORBID OBESITY (H): ICD-10-CM

## 2019-06-11 DIAGNOSIS — O09.529 ANTEPARTUM MULTIGRAVIDA OF ADVANCED MATERNAL AGE: ICD-10-CM

## 2019-06-11 DIAGNOSIS — O34.219 PREVIOUS CESAREAN DELIVERY, ANTEPARTUM CONDITION OR COMPLICATION: ICD-10-CM

## 2019-06-11 DIAGNOSIS — D50.0 IRON DEFICIENCY ANEMIA DUE TO CHRONIC BLOOD LOSS: ICD-10-CM

## 2019-06-11 PROCEDURE — 76817 TRANSVAGINAL US OBSTETRIC: CPT | Performed by: STUDENT IN AN ORGANIZED HEALTH CARE EDUCATION/TRAINING PROGRAM

## 2019-06-11 PROCEDURE — 76801 OB US < 14 WKS SINGLE FETUS: CPT | Mod: 59 | Performed by: STUDENT IN AN ORGANIZED HEALTH CARE EDUCATION/TRAINING PROGRAM

## 2019-07-02 ENCOUNTER — PRENATAL OFFICE VISIT (OUTPATIENT)
Dept: OBGYN | Facility: CLINIC | Age: 39
End: 2019-07-02
Payer: COMMERCIAL

## 2019-07-02 VITALS
WEIGHT: 293 LBS | OXYGEN SATURATION: 100 % | HEART RATE: 96 BPM | SYSTOLIC BLOOD PRESSURE: 132 MMHG | DIASTOLIC BLOOD PRESSURE: 80 MMHG | BODY MASS INDEX: 47.89 KG/M2

## 2019-07-02 DIAGNOSIS — O09.899 SUPERVISION OF OTHER HIGH RISK PREGNANCY, ANTEPARTUM: ICD-10-CM

## 2019-07-02 DIAGNOSIS — O23.41 URINARY TRACT INFECTION IN MOTHER DURING FIRST TRIMESTER OF PREGNANCY: Primary | ICD-10-CM

## 2019-07-02 DIAGNOSIS — E78.5 HYPERLIPIDEMIA, UNSPECIFIED HYPERLIPIDEMIA TYPE: ICD-10-CM

## 2019-07-02 DIAGNOSIS — D50.0 IRON DEFICIENCY ANEMIA DUE TO CHRONIC BLOOD LOSS: ICD-10-CM

## 2019-07-02 DIAGNOSIS — A60.00 GENITAL HERPES SIMPLEX, UNSPECIFIED SITE: ICD-10-CM

## 2019-07-02 DIAGNOSIS — O34.219 PREVIOUS CESAREAN DELIVERY, ANTEPARTUM CONDITION OR COMPLICATION: ICD-10-CM

## 2019-07-02 DIAGNOSIS — Z32.01 PREGNANCY CONFIRMED BY POSITIVE URINE TEST: ICD-10-CM

## 2019-07-02 LAB
FERRITIN SERPL-MCNC: 26 NG/ML (ref 12–150)
HBA1C MFR BLD: 5.6 % (ref 0–5.6)
HGB BLD-MCNC: 10.9 G/DL (ref 11.7–15.7)
IRON SATN MFR SERPL: 9 % (ref 15–46)
IRON SERPL-MCNC: 34 UG/DL (ref 35–180)
TIBC SERPL-MCNC: 397 UG/DL (ref 240–430)

## 2019-07-02 PROCEDURE — 83540 ASSAY OF IRON: CPT | Performed by: OBSTETRICS & GYNECOLOGY

## 2019-07-02 PROCEDURE — 82306 VITAMIN D 25 HYDROXY: CPT | Performed by: OBSTETRICS & GYNECOLOGY

## 2019-07-02 PROCEDURE — 85018 HEMOGLOBIN: CPT | Performed by: OBSTETRICS & GYNECOLOGY

## 2019-07-02 PROCEDURE — 83550 IRON BINDING TEST: CPT | Performed by: OBSTETRICS & GYNECOLOGY

## 2019-07-02 PROCEDURE — 99207 ZZC PRENATAL VISIT: CPT | Performed by: OBSTETRICS & GYNECOLOGY

## 2019-07-02 PROCEDURE — 82728 ASSAY OF FERRITIN: CPT | Performed by: OBSTETRICS & GYNECOLOGY

## 2019-07-02 PROCEDURE — 83036 HEMOGLOBIN GLYCOSYLATED A1C: CPT | Performed by: OBSTETRICS & GYNECOLOGY

## 2019-07-02 PROCEDURE — 36415 COLL VENOUS BLD VENIPUNCTURE: CPT | Performed by: OBSTETRICS & GYNECOLOGY

## 2019-07-02 PROCEDURE — 87086 URINE CULTURE/COLONY COUNT: CPT | Performed by: OBSTETRICS & GYNECOLOGY

## 2019-07-02 PROCEDURE — 87088 URINE BACTERIA CULTURE: CPT | Performed by: OBSTETRICS & GYNECOLOGY

## 2019-07-02 PROCEDURE — 87186 SC STD MICRODIL/AGAR DIL: CPT | Performed by: OBSTETRICS & GYNECOLOGY

## 2019-07-02 RX ORDER — PRENATAL VIT/IRON FUM/FOLIC AC 27MG-0.8MG
1 TABLET ORAL DAILY
Qty: 90 TABLET | Refills: 3 | Status: SHIPPED | OUTPATIENT
Start: 2019-07-02 | End: 2019-09-19

## 2019-07-02 NOTE — PATIENT INSTRUCTIONS
If you have any questions regarding your visit, Please contact your care team.     [employer] [facility]: [phone]  [facility] CLINIC HOURS TELEPHONE NUMBER       Jules Barclay M.D.        Marisol-    Malgorzata Gonzales-Medical Assistant       Monday-[place]  8:00a.m-4:45 p.m  Tuesday-[place]  9:00a.m-4:00 p.m  Wednesday-[place] 8:00a.m-4:45 p.m.  Thursday-[place]  8:00a.m-4:45 p.m.  Friday-[place]  8:00a.m-4:45 p.m. [facility]  [address] [place] [address]  [phone] ask for [facility]  [phone] Fax  Imaging [phone]    [facility] Labor and Delivery  [address] [place] [address] [place] [address]  [phone]    [facility]  [address] [place]  [place] [address]  [phone] ask [facility]  [phone] Fax  Imaging [phone]     Urgent Care locations:    [facility] Monday-Friday  5 pm - 9 pm  Saturday and Sunday   9 am - 5 pm  Monday-Friday   11 am - 9 pm  Saturday and Sunday   9 am - 5 pm   [phone]   If you need a medication refill, please contact your pharmacy. Please allow 3 business days for your refill to be completed.  As always, Thank you for trusting us with your healthcare needs!

## 2019-07-02 NOTE — Clinical Note
Please arrange first trimester screen (this includes ultrasound, labs, and genetic counseling) and possible cell free DNA with MFM at  site if possible. This is for AMA. I will sign the paper referral order later as needed.

## 2019-07-03 ENCOUNTER — MEDICAL CORRESPONDENCE (OUTPATIENT)
Dept: HEALTH INFORMATION MANAGEMENT | Facility: CLINIC | Age: 39
End: 2019-07-03

## 2019-07-03 NOTE — PROGRESS NOTES
No signif signs, symptoms or concerns except worried about delivery. Semi-planned pregnancy. Mod nausea- improved with peppermint. History of preeclampsia, gest DM, postpartum hemorrhage x 2,  section and op note requested again, morbid obesity, AMA, and inactive HSV. Considering TL. May need IV iron now.   Discussed special diagnostic and screening tests and plans (y = yes, n = no/declined, p = possibly/considering): first trimester scr with NT and later AFP or with cell free DNA and later AFP = y, cell free DNA= y, CF carrier scr = n, hemoglobinopathy scr= n, SMA, fragile X  and other genetic scr= n, genetic amnio/CVS = n, quad scr = n, survey u/s = n, Level 2 survey u/s with MFM = y.  Advice appropriate to gestational age reviewed including pertinent Checklist items. Discussed condition, tests and care plan including RBA. Problem list updated. Start ASA 81 mg/d and 1h GTT next.   A/P:  Tiffany was seen today for prenatal care.    Diagnoses and all orders for this visit:    Supervision of other high risk pregnancy, antepartum  -     Hemoglobin A1c  -     Hemoglobin  -     Urine Culture Aerobic Bacterial  -     Vitamin D Deficiency    Previous  delivery, antepartum condition or complication    Iron deficiency anemia due to chronic blood loss  -     Iron and iron binding capacity  -     Ferritin    Pregnancy confirmed by positive urine test  -     Prenatal Vit-Fe Fumarate-FA (PRENATAL MULTIVITAMIN W/IRON) 27-0.8 MG tablet; Take 1 tablet by mouth daily    Hyperlipidemia, unspecified hyperlipidemia type    Genital herpes simplex, unspecified site        Total encounter time (physician together with patient) = 15min. Direct counseling, education and care coordination time (physician together with patient) = 10min.  Jules Barclay MD

## 2019-07-04 LAB
BACTERIA SPEC CULT: ABNORMAL
BACTERIA SPEC CULT: ABNORMAL
SPECIMEN SOURCE: ABNORMAL

## 2019-07-07 RX ORDER — AMOXICILLIN 250 MG/1
250 CAPSULE ORAL 3 TIMES DAILY
Qty: 21 CAPSULE | Refills: 0 | Status: SHIPPED | OUTPATIENT
Start: 2019-07-07 | End: 2019-07-23

## 2019-07-08 LAB — DEPRECATED CALCIDIOL+CALCIFEROL SERPL-MC: 27 UG/L (ref 20–75)

## 2019-07-17 ENCOUNTER — TRANSFERRED RECORDS (OUTPATIENT)
Dept: HEALTH INFORMATION MANAGEMENT | Facility: CLINIC | Age: 39
End: 2019-07-17

## 2019-07-23 ENCOUNTER — PRENATAL OFFICE VISIT (OUTPATIENT)
Dept: OBGYN | Facility: CLINIC | Age: 39
End: 2019-07-23
Payer: COMMERCIAL

## 2019-07-23 VITALS
HEART RATE: 96 BPM | BODY MASS INDEX: 48.86 KG/M2 | DIASTOLIC BLOOD PRESSURE: 87 MMHG | TEMPERATURE: 98.4 F | SYSTOLIC BLOOD PRESSURE: 142 MMHG | WEIGHT: 293 LBS

## 2019-07-23 DIAGNOSIS — O24.419 GESTATIONAL DIABETES MELLITUS (GDM) IN FIRST TRIMESTER, GESTATIONAL DIABETES METHOD OF CONTROL UNSPECIFIED: ICD-10-CM

## 2019-07-23 DIAGNOSIS — O09.899 SUPERVISION OF OTHER HIGH RISK PREGNANCY, ANTEPARTUM: ICD-10-CM

## 2019-07-23 DIAGNOSIS — R82.71 BACTERIURIA DURING PREGNANCY IN FIRST TRIMESTER: ICD-10-CM

## 2019-07-23 DIAGNOSIS — O09.529 ANTEPARTUM MULTIGRAVIDA OF ADVANCED MATERNAL AGE: ICD-10-CM

## 2019-07-23 DIAGNOSIS — O99.891 BACTERIURIA DURING PREGNANCY IN FIRST TRIMESTER: ICD-10-CM

## 2019-07-23 LAB — GLUCOSE 1H P 50 G GLC PO SERPL-MCNC: 250 MG/DL (ref 60–129)

## 2019-07-23 PROCEDURE — 36415 COLL VENOUS BLD VENIPUNCTURE: CPT | Performed by: OBSTETRICS & GYNECOLOGY

## 2019-07-23 PROCEDURE — 82950 GLUCOSE TEST: CPT | Performed by: OBSTETRICS & GYNECOLOGY

## 2019-07-23 PROCEDURE — 99207 ZZC PRENATAL VISIT: CPT | Performed by: OBSTETRICS & GYNECOLOGY

## 2019-07-23 NOTE — PROGRESS NOTES
No signif signs, symptoms or concerns except had postcoital bleeding and neg ED evaluation. Reports neg first trim screening and neg Innatal test. UTI was treated. IV iron advised. FHT not heard today but bladder empty. Start aspirin 81 mg/d. Advice appropriate to gestational age reviewed including pertinent Checklist items. Discussed condition, tests and care plan including RBA. Problem list updated. AFP next.   A/P:  Tiffany was seen today for prenatal care.    Diagnoses and all orders for this visit:    Supervision of other high risk pregnancy, antepartum  -     Glucose tolerance gest screen 1 hour    Bacteriuria during pregnancy in first trimester    Antepartum multigravida of advanced maternal age        Total encounter time (physician together with patient) = 15min. Direct counseling, education and care coordination time (physician together with patient) = 10min.  Jules Barclay MD

## 2019-07-24 ENCOUNTER — NURSE TRIAGE (OUTPATIENT)
Dept: NURSING | Facility: CLINIC | Age: 39
End: 2019-07-24

## 2019-07-24 PROBLEM — O24.419 GESTATIONAL DIABETES MELLITUS (GDM) IN FIRST TRIMESTER: Status: ACTIVE | Noted: 2019-07-24

## 2019-07-24 NOTE — TELEPHONE ENCOUNTER
"14 weeks pregnant/ has slight spotting after intercourse.  No other symptoms  Boo SepulvedaREA -[phone]    Additional Information    Negative: Shock suspected (e.g., cold/pale/clammy skin, too weak to stand, low BP, rapid pulse)    Negative: Difficult to awaken or acting confused (e.g., disoriented, slurred speech)    Negative: Passed out (i.e., lost consciousness, collapsed and was not responding)    Negative: Sounds like a life-threatening emergency to the triager    Negative: [1] Vaginal bleeding AND [2] pregnant > 20 weeks    Negative: Not pregnant or pregnancy status unknown    Negative: SEVERE abdominal pain    Negative: [1] SEVERE vaginal bleeding (i.e., soaking 2 pads / hour, large blood clots) AND [2] present 2 or more hours    Negative: SEVERE dizziness (e.g., unable to stand, requires support to walk, feels like passing out)    Negative: [1] MODERATE vaginal bleeding (i.e., soaking 1 pad / hour; clots) AND [2] present > 6 hours    Negative: [1] MODERATE vaginal bleeding (i.e., soaking 1 pad / hour; clots) AND [2] pregnant > 12 weeks    Negative: Passed tissue (e.g., gray-white)    Negative: Shoulder pain    Negative: Pale skin (pallor) of new onset or worsening    Negative: Patient sounds very sick or weak to the triager    Negative: [1] Constant abdominal pain AND [2] present > 2 hours    Negative: Fever > 100.4 F (38.0 C)    Negative: [1] Intermittent lower abdominal pain (e.g., cramping) AND [2] present > 24 hours    Negative: Prior history of \"ectopic pregnancy\" or previous tubal surgery (e.g., tubal ligation)    Negative: Pain or burning with urination    Negative: MODERATE vaginal bleeding (i.e., soaking 1 pad / hour; clots)    Negative: Has IUD    Negative: MILD vaginal bleeding (i.e., less than 1 pad / hour; less than patient's usual menstrual bleeding; not just spotting)    Negative: SPOTTING lasts > 48 hours or spotting happens more than once in a week    Negative: Unusual vaginal " discharge (e.g., bad smelling, yellow, green, or foamy-white)    Negative: Not feeling pregnant any longer (e.g., breast tenderness or nausea has disappeared)    SPOTTING after sexual intercourse (single or brief episode)    Protocols used: PREGNANCY - VAGINAL BLEEDING LESS THAN 20 WEEKS EGA-A-AH

## 2019-07-25 ENCOUNTER — TELEPHONE (OUTPATIENT)
Dept: OBGYN | Facility: CLINIC | Age: 39
End: 2019-07-25

## 2019-07-25 NOTE — TELEPHONE ENCOUNTER
Diabetes Education Scheduling Outreach #1:    Call to patient to schedule. She is currently in the hospital. Had some phone connection and she said she will call us back.    Plan for 2nd outreach attempt within 1 business day.    Ava Hoyt OnCall  Diabetes and Nutrition Scheduling

## 2019-08-14 ENCOUNTER — TELEPHONE (OUTPATIENT)
Dept: OBGYN | Facility: CLINIC | Age: 39
End: 2019-08-14

## 2019-08-14 NOTE — TELEPHONE ENCOUNTER
[facility] Call Center    Phone Message    May a detailed message be left on voicemail: yes    Reason for Call: Other: Pt is calling and requests that Dr. Barclay put in an updated referral for an iron infusion.  Please call pt once ordered is placed or if you have any questions.     Action Taken: Message routed to:  [facility] p [phone]

## 2019-08-15 NOTE — TELEPHONE ENCOUNTER
I have updated the start date for the IV iron infusion plan. Please notify and arrange.   Jules Barclay MD

## 2019-08-15 NOTE — TELEPHONE ENCOUNTER
"RN called and was able to speak to patient. She spent the day at [facility] for insurance. Because of her marriage and circumstances outside her control, they had additional questions and required her to reapply. She hopes to complete this process by tomorrow, however, is not sure how long it will take to go through and become \"active.\"    Patient is scheduled for an prenatal appt on Monday 8/19 and is in need of Iron Infusion. Financial services through infusion center were notified of circumstances, state they will keep an eye out, usually once application is completed it becomes active soon after.    Lola Francis RN on 8/15/2019 at 3:19 PM      "

## 2019-08-15 NOTE — TELEPHONE ENCOUNTER
RN verified placed order, called pharmacy to verify they had Iron Sucrose in stock- they confirmed that they do. RN reached out to financial services through infusion center- Emma Tracy  for approval of treatment. Once approved will reach out to infusion scheduling to get patient scheduled.     Lola Francis RN on 8/15/2019 at 8:46 AM

## 2019-08-15 NOTE — TELEPHONE ENCOUNTER
"RN speaking to Emma Tracy, Patient's insurance was terminated on 7/31 and shows \"inactive\". Per Emma it appears patient has coverage through the state and she should contact them and ask about why it still is \"inactive\".     RN called patient, states insurance should not be inactive as she just received a card from insurance company. RN asked her to call insurance to verify it is active.    Lola Francis RN on 8/15/2019 at 10:07 AM    "

## 2019-08-16 NOTE — TELEPHONE ENCOUNTER
Patient may re-schedule OB visit if desired or may keep the visit if any new active problems and is able to be seen.  Jules Barclay MD

## 2019-08-19 ENCOUNTER — PRENATAL OFFICE VISIT (OUTPATIENT)
Dept: OBGYN | Facility: CLINIC | Age: 39
End: 2019-08-19
Payer: MEDICAID

## 2019-08-19 VITALS
SYSTOLIC BLOOD PRESSURE: 116 MMHG | BODY MASS INDEX: 49.5 KG/M2 | WEIGHT: 293 LBS | OXYGEN SATURATION: 97 % | HEART RATE: 94 BPM | DIASTOLIC BLOOD PRESSURE: 67 MMHG

## 2019-08-19 DIAGNOSIS — O99.891 BACTERIURIA DURING PREGNANCY IN SECOND TRIMESTER: ICD-10-CM

## 2019-08-19 DIAGNOSIS — O24.419 GESTATIONAL DIABETES MELLITUS (GDM) IN FIRST TRIMESTER, GESTATIONAL DIABETES METHOD OF CONTROL UNSPECIFIED: ICD-10-CM

## 2019-08-19 DIAGNOSIS — O09.899 SUPERVISION OF OTHER HIGH RISK PREGNANCY, ANTEPARTUM: ICD-10-CM

## 2019-08-19 DIAGNOSIS — R82.71 BACTERIURIA DURING PREGNANCY IN SECOND TRIMESTER: ICD-10-CM

## 2019-08-19 DIAGNOSIS — O34.219 PREVIOUS CESAREAN DELIVERY, ANTEPARTUM CONDITION OR COMPLICATION: ICD-10-CM

## 2019-08-19 LAB
SPECIMEN SOURCE: ABNORMAL
WET PREP SPEC: ABNORMAL

## 2019-08-19 PROCEDURE — 99213 OFFICE O/P EST LOW 20 MIN: CPT | Performed by: OBSTETRICS & GYNECOLOGY

## 2019-08-19 PROCEDURE — 87210 SMEAR WET MOUNT SALINE/INK: CPT | Performed by: OBSTETRICS & GYNECOLOGY

## 2019-08-19 PROCEDURE — 87086 URINE CULTURE/COLONY COUNT: CPT | Performed by: OBSTETRICS & GYNECOLOGY

## 2019-08-19 PROCEDURE — 99000 SPECIMEN HANDLING OFFICE-LAB: CPT | Performed by: OBSTETRICS & GYNECOLOGY

## 2019-08-19 PROCEDURE — 82105 ALPHA-FETOPROTEIN SERUM: CPT | Mod: 90 | Performed by: OBSTETRICS & GYNECOLOGY

## 2019-08-19 PROCEDURE — 87088 URINE BACTERIA CULTURE: CPT | Performed by: OBSTETRICS & GYNECOLOGY

## 2019-08-19 PROCEDURE — 87186 SC STD MICRODIL/AGAR DIL: CPT | Performed by: OBSTETRICS & GYNECOLOGY

## 2019-08-19 PROCEDURE — 36415 COLL VENOUS BLD VENIPUNCTURE: CPT | Performed by: OBSTETRICS & GYNECOLOGY

## 2019-08-19 NOTE — TELEPHONE ENCOUNTER
Unable to reach patient via phone. Left message to call clinic back at [phone] and ask for [facility].    Calling to find out if patient would like to keep her appointment with Dr. Barclay today and get update on status of insurance. If insurance is not yet active patient can consider rescheduling.     Lola Francis, RN on 8/19/2019 at 8:21 AM

## 2019-08-19 NOTE — Clinical Note
Please arrange Level 2 ultrasound with MFM at [facility] site if possible. This is for AMA. I will sign the paper referral order later as needed.

## 2019-08-19 NOTE — PATIENT INSTRUCTIONS
If you have any questions regarding your visit, Please contact your care team.     [email or web address] [facility]: [phone]  [facility] CLINIC HOURS TELEPHONE NUMBER       Jules Barclay M.D.        Marisol-    Malgorzata Gonzales-Medical Assistant       Monday-[place]  8:00a.m-4:45 p.m  Tuesday-[place]  9:00a.m-4:00 p.m  Wednesday-[place] 8:00a.m-4:45 p.m.  Thursday-[place]  8:00a.m-4:45 p.m.  Friday-[place]  8:00a.m-4:45 p.m. [facility]  [address] [place] [address]  [phone] ask for [facility]  [phone] Fax  Imaging [phone]    [facility] Labor and Delivery  [address] [place] [address] [place] [address]  [phone]    [facility]  [address]  [place] [address]  [phone] ask [facility]  [phone] Fax  Imaging [phone]     Urgent Care locations:    [facility] Monday-Friday  5 pm - 9 pm  Saturday and Sunday   9 am - 5 pm  Monday-Friday   11 am - 9 pm  Saturday and Sunday   9 am - 5 pm   [phone]   If you need a medication refill, please contact your pharmacy. Please allow 3 business days for your refill to be completed.  As always, Thank you for trusting us with your healthcare needs!

## 2019-08-20 ENCOUNTER — TELEPHONE (OUTPATIENT)
Dept: OBGYN | Facility: CLINIC | Age: 39
End: 2019-08-20

## 2019-08-20 ENCOUNTER — MEDICAL CORRESPONDENCE (OUTPATIENT)
Dept: HEALTH INFORMATION MANAGEMENT | Facility: CLINIC | Age: 39
End: 2019-08-20

## 2019-08-20 LAB
# FETUSES US: NORMAL
# FETUSES: 1
AFP ADJ MOM AMN: 0.65
AFP SERPL-MCNC: 17 NG/ML
AGE - REPORTED: 40.7 YR
CURRENT SMOKER: NO
CURRENT SMOKER: NO
DIABETES STATUS PATIENT: NO
FAMILY MEMBER DISEASES HX: NO
FAMILY MEMBER DISEASES HX: NO
GA METHOD: NORMAL
GA METHOD: NORMAL
GA: NORMAL WK
IDDM PATIENT QL: NO
INTEGRATED SCN PATIENT-IMP: NORMAL
LMP START DATE: NORMAL
MONOCHORIONIC TWINS: NO
SERVICE CMNT-IMP: NO
SPECIMEN DRAWN SERPL: NORMAL
VALPROIC/CARBAMAZEPINE STATUS: NO
WEIGHT UNITS: NORMAL

## 2019-08-20 NOTE — PROGRESS NOTES
No signif signs, symptoms or concerns except waiting for medical insurance to be reinstated before IV iron. Has focal right areola 6 mm subcutaneous tender smooth shallow nodule at site of previous skin treatment- may try Bactroban and observe. Some dysuria and vaginal irritation. C/S records pending- will request again. TOLAC? Gest DM = type 2 DM and follow-up education on diet planned. Advice appropriate to gestational age reviewed including pertinent Checklist items. Discussed condition, tests and care plan including RBA. Problem list updated. Level 2 ultrasound next.  A/P:  Tiffany was seen today for prenatal care.    Diagnoses and all orders for this visit:    Supervision of other high risk pregnancy, antepartum  -     Alpha fetoprotein maternal screen  -     Wet prep  -     Urine Culture Aerobic Bacterial    Gestational diabetes mellitus (GDM) in first trimester, gestational diabetes method of control unspecified    Previous  delivery, antepartum condition or complication        Total encounter time (physician together with patient) = 15min. Direct counseling, education and care coordination time (physician together with patient) = 10min.  Jules Barclay MD

## 2019-08-20 NOTE — TELEPHONE ENCOUNTER
Jules Barclay MD  P  Ob/Gyn Ma/Lpn             Please arrange Level 2 ultrasound with MFM at  site if possible. This is for AMA. I will sign the paper referral order later as needed.      Referral filled out and signed by Dr. Agbeh. Faxed over and sent to scanning    Dea Colonbach  Women[facility]

## 2019-08-21 PROBLEM — R82.71 BACTERIURIA DURING PREGNANCY IN SECOND TRIMESTER: Status: ACTIVE | Noted: 2019-08-21

## 2019-08-21 PROBLEM — O99.891 BACTERIURIA DURING PREGNANCY IN SECOND TRIMESTER: Status: ACTIVE | Noted: 2019-08-21

## 2019-08-21 LAB
BACTERIA SPEC CULT: ABNORMAL
SPECIMEN SOURCE: ABNORMAL

## 2019-08-21 RX ORDER — CEPHALEXIN 500 MG/1
500 CAPSULE ORAL 3 TIMES DAILY
Qty: 21 CAPSULE | Refills: 0 | Status: SHIPPED | OUTPATIENT
Start: 2019-08-21 | End: 2019-09-09

## 2019-08-22 ENCOUNTER — TELEPHONE (OUTPATIENT)
Dept: OBGYN | Facility: CLINIC | Age: 39
End: 2019-08-22

## 2019-08-22 NOTE — TELEPHONE ENCOUNTER
[facility] Call Center    Phone Message    May a detailed message be left on voicemail: yes    Reason for Call: Other: Pt returned voicemail regarding results. No response to [facility] message. Please advise.     Action Taken: Message routed to:  [facility] p [phone]

## 2019-08-26 ENCOUNTER — ALLIED HEALTH/NURSE VISIT (OUTPATIENT)
Dept: EDUCATION SERVICES | Facility: CLINIC | Age: 39
End: 2019-08-26
Payer: MEDICAID

## 2019-08-26 VITALS — WEIGHT: 293 LBS | BODY MASS INDEX: 49.42 KG/M2

## 2019-08-26 DIAGNOSIS — O24.419: Primary | ICD-10-CM

## 2019-08-26 PROCEDURE — G0108 DIAB MANAGE TRN  PER INDIV: HCPCS

## 2019-08-26 RX ORDER — BLOOD-GLUCOSE METER
1 EACH MISCELLANEOUS ONCE
Qty: 1 KIT | Refills: 0 | COMMUNITY
Start: 2019-08-26 | End: 2020-03-04

## 2019-08-26 NOTE — PATIENT INSTRUCTIONS
1. Check blood sugar 4 times a day, before breakfast and 1 hour after the start of each meal.     2. Check urine ketones when you wake up every morning for 7 days. If negative everyday, reduce testing to once a week.    3. Follow the recommended meal plan: eat something every 2-3 hours, include protein/fat and carbohydrate at every meal and snack, have 2-3 carbs at breakfast, 3-4 carbs at lunch, 3-4 carbs at supper, 1-2 carbs at 3 snacks per day.     4. Add activity to every day, try walking or being active after each meal to help control blood sugar levels.    5.Call or e-mail educator if 3 or more blood sugars are above goal in 1 week. Call or e-mail with questions or concerns.    Mariya Lemon RD, LD, CDE    [place] [facility] for the [facility]:  For Your Diabetes Education or Nutrition Appointments Call:  [phone]   For Diabetes Education and Nutrition Related Questions:   Phone: [phone]  E-mail: [email or web address]  Fax: [phone]   If you need a medication refill please contact your pharmacy. Please allow 3 business days for your refills to be completed.       Snack Ideas for your Meal Plan     Protein (1 serving = 6-8 grams of protein each)    Beef Jerky ( 2 pieces)    Beef Sticks, plain (1 stick)    Cheese/Cheese Stick (1 oz)    Chicken breast (1 oz)    Cottage cheese, low fat (1/4 cup)    Crab, Imitation (2 oz)    Deli Meat (2 oz)    Edamame, boiled (1/2 cup)    Egg, hardboiled (1)    Shrimp, small (10 pieces)     Turkey Breast (1 oz)    Tuna, canned in water (1 oz)    Fairlife Milk (1/2 cup)    Yogurt, Greek : Siggis, Oikos Triple Zero, Dannon Light and Fit, etc (6 oz)    Carb (1 carb choice/serving = 15 grams)     Apple (medium)    Applesauce, unsweetened (1/2 cup)    Apricots, dried (4 whole)    Banana, medium (1/2)    Bread, 1 slice     Cantaloupe/Melon (1 cup)    Crackers 4-6 (varies by brand)    Cherries (15)    Cranberries, Dried (2  tbsp)    Dark Chocolate (1 oz)    Dates, dried (2-3 pieces)    Fruit cocktail, in own juice (1/2 cup)    Granola Bar (vary by brand)    Grapes (1/2 cup)    Ice cream, slow churned/low fat (1/2 cup)    Kiwi (~2)    Mixed Berries (1 cup)    Orange (1 medium)    Peach (1 medium)    Pear (1/2 medium)    Plums (2)    Pomegranate (1 small)    Popcorn, stove popped (2 cups)    Pretzels (3/4 oz)    Pudding, sugar free (1/2 cup)    Raisins (2 Tbsp)    Rice Cake, (2)    Skim or 1% milk (1 cup)    Tortilla Chips (1 oz)    Yogurt (greek or plain)   cup    Fat (1 serving = 100 calories)    Almonds (2 Tbsp)    Avocado (1/3 fruit OR 3 Tbsp)    Cashews (11 whole)    Cheese (1 oz)    Chocolate, dark (3/4 oz)    Coconut, unsweetened (1/3 c shredded)    Hummus (3 Tbsp)    Peanuts (20 pieces)    Peanut/Nut Butter (1 Tbsp)    Pecans (10 halves)    Pumpkin seeds, unshelled (2 Tbsp)    Salad dressing  (1-2 Tbsp)    Sunflower seeds (2 Tbsp)    Vegetable Dip (1-2 Tbsp)    Walnuts (8 halves)    Free Foods    Bell Peppers    Broccoli    Carrots     Cauliflower     Celery    Coffee, black (regular or decaf)    Cucumber    Gelatin, Sugar Free    Herbal Tea, unsweetened     Pea Pods    Pickles (high in sodium)     Popsicle, Sugar Free    Salsa (2 Tbsp)    Tomatoes    Combination Snacks    Apple + 1 Tbsp peanut butter (carbohydrate + fat)    Handful crackers + 1 oz cheese (carbohydrate + fat or protein)    Carrot sticks + Hummus (free food + fat)    1 piece of peanut butter toast (carbohydrate + fat)    Greek yogurt + 2 Tbsp sunflower seeds (carbohydrate + fat)    Hard-boiled egg +   cup grapes (protein + carbohydrate)    1 piece of avocado toast (carbohydrate + fat)    Cucumbers + dip (free food + fat)

## 2019-08-26 NOTE — PROGRESS NOTES
Diabetes Self-Management Education & Support    Gestational Diabetes Self-Management Education & Support    SUBJECTIVE/OBJECTIVE:  Presents for education related to gestational diabetes.    Accompanied by: Self  Diabetes management related comments/concerns: Tiffany had GDM with her last pregnancy, which was about 2 years ago. She had education, but she was down in [place] and she didn't feel that it was great education/diabetes care. She does mention that she followed a meal plan, but she can't remember what it consisted of.     She has had a lot of N/V with this pregnancy, which is triggered by certain foods.  She has a pretty limited selection of foods that don't make her nauseated, which makes following a healthy diet plan very challenging for Tiffany.  She does note that this is improving, but she is still careful.     Cultural Influences/Ethnic Background:  American    Estimated Date of Delivery: 2020    1 hour OGTT  Lab Results   Component Value Date    GLU1 250 (H) 2019       3 hour OGTT    Fasting  No results found for: GLF    1 hour  No results found for: GL1    2 hour  No results found for: GL2    3 hour  No results found for: GL3      It looks like the 1 hour test resulted in a 250 mg/dl reading, therefor a 3 hour test was not completed      Lifestyle and Health Behaviors:  Pre-pregnancy weight (lbs): 280  Exercise:: Currently not exercising  Days per week of moderate to strenuous exercise (like a brisk walk): 0  Meals include: Breakfast, Lunch, Dinner, Snacks(hard to eat fruit, but she likes melons)  Beverages: Water  Cultural/Druze diet restrictions?: No  Biggest challenges to healthy eating: Other(foods are making her nauseas)  Pre- vitamin?: Yes(was making her nauseas, but she just started taking it)    Breakfast: boiled eggs and toast with water OR McDonalds breakfast sandwich with water   Snack: Cashews (but these gave her dry heaves)  Lunch: deli sandwich with hawaiian bread OR  green vegetable with rice   Snack: yogurt and cheese did not work for her, she tried milk but cannot drink it plain so she has 2 oatmeal cookies and then she got sick from the sweets  Dinner: Light soup- tomatoes, chicken and beef, okra, with Fufu  Snack: sometimes has a bedtime snack, she used to get up and have cereal but this is making her sick      Healthy Coping:       Current Management:  Taking medications for gestational diabetes?: No  Difficulty affording diabetes management supplies?: No    ASSESSMENT:  Reviewed target blood glucose values, sharps disposal. Patient advised to call if 3 blood glucose elevated before returns next week. Instructed on ketone checking and told to call if unable to get ketones negative.     Discussed carbohydrate sources and impact on blood glucose. Reviewed basics of healthy eating and incorporating a variety of foods into meal plan. Instructed on carbohydrate counting and recommended patient consume 2-3 CHO for breakfast, 3-4 CHO for lunch and dinner and 1-2 CHO for each snack, 3 snacks a day. This meal plan will provide 11-17 CHO/day so informed patient to call if struggling since may be able to adjust meal plan if needed.  To also help prevent against ketone formation, protein was encouraged with meals and snacks, especially with the night snack. Reviewed benefits of exercising to help lower blood glucose and encouraged 30 minutes a day as tolerated and per MD approval, and to target exercise after meals if feel consumed too many CHO or having problem with BG being elevated after a meal. Pt verbalized understanding of concepts discussed and recommendations provided.    INTERVENTION:  Patient was instructed on Accu-Chek Guide meter and was able to provide an accurate return demonstration. Patient's blood glucose reading today was 100 mg/dL, about 2 hours after breakfast.    Educational topics covered today:  Means of controlling GDM, Using a Blood Glucose Monitor, Blood  Glucose Goals, Logging and Interpreting Glucose Results, Ketone Testing, When to Call a Diabetes Educator or OB Provider, Healthy Eating During Pregnancy, Counting Carbohydrates, Meal Planning for GDM, and Physical Activity    Educational materials provided today:   [facility] Understanding Gestational Diabetes  GDM Log Book  Sharps Disposal  Care After Delivery  Accu-Chek Guide meter kit    Pt verbalized understanding of concepts discussed and recommendations provided today.     PLAN:  Check glucose 4 times daily, before breakfast and 1 hour after each meal.     Check Ketones daily for one week, if negative, reduce testing to once a week.     Physical activity recommended: 10-15 minutes after meals.    Meal plan: 2-3 carbs at breakfast, 3-4 carbs at lunch, 3-4 carbs at supper, 1-2 carbs at 3 snacks a day.  Follow consistent CHO meal plan, eat CHO and protein/fat at all meals/snacks.    Call/e-mail/MyChart message diabetes educator if 3 or more blood sugars are above the goal in 1 week, if ketones are positive, or with questions/concerns.    Mariya Lemon, TENZIN, LD, CDE  Time Spent: 60 minutes  Encounter Type: Individual    Any diabetes medication dose changes were made via the CDE Protocol and Collaborative Practice Agreement with the patient's referring provider. A copy of this encounter was shared with the provider.

## 2019-08-26 NOTE — Clinical Note
Adin Bo!Just PHILIPPE Allen is scheduled with you next week. She has had GDM before, but education was in [place].  I only had 60 minutes, so I only covered testing BG and ketones and carbohydrate counting, I didn't have time to go through pathophysiology, risks of uncontrolled GDM, preventing diabetes. Hopefully she comes to follow-up!Mariya Lemon, RD, LD, CDE

## 2019-08-27 ENCOUNTER — TELEPHONE (OUTPATIENT)
Dept: OBGYN | Facility: CLINIC | Age: 39
End: 2019-08-27

## 2019-08-27 DIAGNOSIS — O34.219 PREVIOUS CESAREAN DELIVERY, ANTEPARTUM CONDITION OR COMPLICATION: ICD-10-CM

## 2019-08-27 NOTE — TELEPHONE ENCOUNTER
called out to [facility] to see what was the time delay for the records we had requested. This patient is not in their system. They tried a few different ways to search but still could not find patient     Dea Valerio  Women's Health

## 2019-09-06 ENCOUNTER — PATIENT OUTREACH (OUTPATIENT)
Dept: EDUCATION SERVICES | Facility: CLINIC | Age: 39
End: 2019-09-06
Payer: COMMERCIAL

## 2019-09-06 DIAGNOSIS — O24.419: Primary | ICD-10-CM

## 2019-09-06 RX ORDER — LANCETS
EACH MISCELLANEOUS
Qty: 102 EACH | Refills: 1 | Status: SHIPPED | OUTPATIENT
Start: 2019-09-06 | End: 2020-03-04

## 2019-09-06 NOTE — PROGRESS NOTES
Pt left voicemail saying she needs needles, it looks like she needs a lancet script.      Prescription sent for Accu-Chek Fastclix lancets.  Called Tiffany and left message  Loli at [employer] -I sent the prescription for lancets to [facility] on [facility] [facility].    Sharlene Hayden RD, LD, CDE

## 2019-09-09 ENCOUNTER — INFUSION THERAPY VISIT (OUTPATIENT)
Dept: INFUSION THERAPY | Facility: CLINIC | Age: 39
End: 2019-09-09
Payer: COMMERCIAL

## 2019-09-09 VITALS
RESPIRATION RATE: 16 BRPM | HEART RATE: 84 BPM | SYSTOLIC BLOOD PRESSURE: 118 MMHG | TEMPERATURE: 98.5 F | DIASTOLIC BLOOD PRESSURE: 76 MMHG | BODY MASS INDEX: 49.9 KG/M2 | WEIGHT: 293 LBS | OXYGEN SATURATION: 98 %

## 2019-09-09 DIAGNOSIS — T45.4X5A ADVERSE EFFECT OF PREPARATION OF IRON COMPOUND, INITIAL ENCOUNTER: ICD-10-CM

## 2019-09-09 DIAGNOSIS — D50.0 IRON DEFICIENCY ANEMIA DUE TO CHRONIC BLOOD LOSS: Primary | ICD-10-CM

## 2019-09-09 PROCEDURE — 96365 THER/PROPH/DIAG IV INF INIT: CPT | Performed by: NURSE PRACTITIONER

## 2019-09-09 PROCEDURE — 99207 ZZC NO CHARGE LOS: CPT

## 2019-09-09 PROCEDURE — 96366 THER/PROPH/DIAG IV INF ADDON: CPT | Performed by: NURSE PRACTITIONER

## 2019-09-09 RX ORDER — DIPHENHYDRAMINE HCL 25 MG
25 CAPSULE ORAL EVERY 6 HOURS PRN
Status: DISCONTINUED | OUTPATIENT
Start: 2019-09-09 | End: 2019-09-09 | Stop reason: HOSPADM

## 2019-09-09 RX ORDER — ASPIRIN 81 MG/1
81 TABLET, CHEWABLE ORAL DAILY
COMMUNITY
End: 2020-03-04

## 2019-09-09 RX ADMIN — Medication 25 MG: at 17:00

## 2019-09-09 ASSESSMENT — PAIN SCALES - GENERAL: PAINLEVEL: NO PAIN (0)

## 2019-09-09 NOTE — PROGRESS NOTES
Infusion Nursing Note:  Tiffany Sanz presents today for 1st, of 3, Venofer infusions.    Patient seen by provider today: No   present during visit today: Not Applicable.    Note:   Oriented patient to the department, including when/how to use call light.    Verbally informed patient of intended effects, as well as potential side effects from Venofer.  Provided patient with written patient education, from [facility]oree, regarding Venofer.    After 60 minutes of infusing, of 90 minutes, patient put on light and said she has had mild itching on arms and legs for past 15 minutes.  Itching did not progress.  No hives.  Stopped Venofer and itching subsided within 10 minutes.  Paged Dr Barclay for guidance.  did not respond to page.  Discussed case with Fany Cardoso NP whom ordered Benadryl 25 mg po.  Oral Benadryl given.  Completed infusion without any further difficulty.    Intravenous Access:  Peripheral IV placed.    Treatment Conditions:  Not Applicable.      Post Infusion Assessment:  Patient tolerated infusion with mild itching that resolved.  Blood return noted pre and post infusion.  Site patent and intact, free from redness, edema or discomfort.  No evidence of extravasations.  Access discontinued per protocol.  Informed patient she can take a second Benadryl 25 mg po this evening if itching resumes and to follow up with ordering provider with any questions/concerns about symptoms/side effects.       Discharge Plan:   Patient will return 9/16/19 for next appointment.   Patient discharged in stable condition accompanied by: self.  Departure Mode: Ambulatory.    Monie New, RN, RN

## 2019-09-09 NOTE — PROGRESS NOTES
Was informed by infusion nurse of reaction to iron with itchy skin noted x 15 min when reported by pt 200 mls into 290 ml infusion. Infusion was stopped and itching stopped. MD was paged and no answer. Pt was held x 30 min. Reviewed with this provider who ordered diphenhydramine 25 mg and restart last of the iron infusion.   Pt may follow with 25 mg more tonight as needed. Should start next infusion with the diphenhydramine prior. SGermscheid,CNP

## 2019-09-13 ENCOUNTER — TELEPHONE (OUTPATIENT)
Dept: OBGYN | Facility: CLINIC | Age: 39
End: 2019-09-13

## 2019-09-13 NOTE — TELEPHONE ENCOUNTER
Health Call Center    Phone Message    May a detailed message be left on voicemail: no    Reason for Call: Symptoms or Concerns     If patient has red-flag symptoms, warm transfer to triage line   Pt asking for a call back today.     Current symptom or concern: ingrown hair or bumps under right arm.  Pt states she had a staph infection previously right breast and nipple.  Pt states the area burns.     Symptoms have been present for:  8 months. Pt had it drained at [facility] previously. Pt is 5 months pregnant.     Has patient previously been seen for this? Yes        Action Taken: Message routed to:  [facility] p [phone]

## 2019-09-13 NOTE — TELEPHONE ENCOUNTER
Pt has an in grown hair on her armpit or pimple. Pt popped it and now it hurts. Pt is afraid this could be cancer. Pt had a staph infection on the other arm and is afraid it spread. Pt is also concerned it is cancer because her nipples are sore.     Writer tried to explain to pt that this doesn't sound like cancer at this point, that her labs look normal, that nipple soreness in pregnancy is extremely common, that a staph infection was drained and cleared. That we would not usually see a staph infection spread to another limb.     Pt wanted to be see on Monday by Dr. Barclay, writer informed pt that we had no openings, writer advised pt call and be seen by FP.       Pt will call FP to be seen.       Louise Greer RN on 9/13/2019 at 1:32 PM

## 2019-09-16 ENCOUNTER — INFUSION THERAPY VISIT (OUTPATIENT)
Dept: INFUSION THERAPY | Facility: CLINIC | Age: 39
End: 2019-09-16
Payer: COMMERCIAL

## 2019-09-16 VITALS
HEART RATE: 100 BPM | SYSTOLIC BLOOD PRESSURE: 135 MMHG | RESPIRATION RATE: 18 BRPM | HEIGHT: 66 IN | TEMPERATURE: 97.8 F | DIASTOLIC BLOOD PRESSURE: 74 MMHG | WEIGHT: 293 LBS | BODY MASS INDEX: 47.09 KG/M2 | OXYGEN SATURATION: 100 %

## 2019-09-16 DIAGNOSIS — D50.0 IRON DEFICIENCY ANEMIA DUE TO CHRONIC BLOOD LOSS: Primary | ICD-10-CM

## 2019-09-16 PROCEDURE — 99207 ZZC NO CHARGE LOS: CPT

## 2019-09-16 PROCEDURE — 96366 THER/PROPH/DIAG IV INF ADDON: CPT | Performed by: NURSE PRACTITIONER

## 2019-09-16 PROCEDURE — 96365 THER/PROPH/DIAG IV INF INIT: CPT | Performed by: NURSE PRACTITIONER

## 2019-09-16 RX ORDER — DIPHENHYDRAMINE HCL 25 MG
25 CAPSULE ORAL ONCE
Status: COMPLETED | OUTPATIENT
Start: 2019-09-16 | End: 2019-09-16

## 2019-09-16 RX ORDER — DIPHENHYDRAMINE HCL 25 MG
25 CAPSULE ORAL ONCE
Status: CANCELLED
Start: 2019-09-18

## 2019-09-16 RX ADMIN — Medication 25 MG: at 12:09

## 2019-09-16 ASSESSMENT — PAIN SCALES - GENERAL: PAINLEVEL: NO PAIN (0)

## 2019-09-16 ASSESSMENT — MIFFLIN-ST. JEOR: SCORE: 2098.78

## 2019-09-16 NOTE — PROGRESS NOTES
Infusion Nursing Note:  Tiffany Sanz presents today for Venofer #2/3.    Patient seen by provider today: No   present during visit today: Not Applicable.    Note: patient has itching near end of last venofer.  Oral benedryl given 30 min prior to start of infusion.  Patient has no c/o throughout infusion today.    Intravenous Access:  Peripheral IV placed.    Treatment Conditions:  Not Applicable.      Post Infusion Assessment:  Patient tolerated infusion without incident.       Discharge Plan:   RTC as scheduled for dose #3 Venofer.    ELAINE KNOWLES, RN, RN

## 2019-09-18 ENCOUNTER — INFUSION THERAPY VISIT (OUTPATIENT)
Dept: INFUSION THERAPY | Facility: CLINIC | Age: 39
End: 2019-09-18
Payer: COMMERCIAL

## 2019-09-18 VITALS
SYSTOLIC BLOOD PRESSURE: 125 MMHG | OXYGEN SATURATION: 98 % | DIASTOLIC BLOOD PRESSURE: 80 MMHG | HEART RATE: 90 BPM | TEMPERATURE: 97.9 F | RESPIRATION RATE: 18 BRPM

## 2019-09-18 DIAGNOSIS — D50.0 IRON DEFICIENCY ANEMIA DUE TO CHRONIC BLOOD LOSS: Primary | ICD-10-CM

## 2019-09-18 PROCEDURE — 96365 THER/PROPH/DIAG IV INF INIT: CPT | Performed by: NURSE PRACTITIONER

## 2019-09-18 PROCEDURE — 99207 ZZC NO CHARGE LOS: CPT

## 2019-09-18 PROCEDURE — 96366 THER/PROPH/DIAG IV INF ADDON: CPT | Performed by: NURSE PRACTITIONER

## 2019-09-18 RX ORDER — DIPHENHYDRAMINE HCL 25 MG
25 CAPSULE ORAL ONCE
Status: COMPLETED | OUTPATIENT
Start: 2019-09-18 | End: 2019-09-18

## 2019-09-18 RX ORDER — DIPHENHYDRAMINE HCL 25 MG
25 CAPSULE ORAL ONCE
Status: CANCELLED
Start: 2019-09-18

## 2019-09-18 RX ADMIN — Medication 25 MG: at 11:50

## 2019-09-18 ASSESSMENT — PAIN SCALES - GENERAL: PAINLEVEL: NO PAIN (0)

## 2019-09-18 NOTE — PROGRESS NOTES
Infusion Nursing Note:  Tiffany Sanz presents today for Venofer # 3 of 3.    Patient seen by provider today: No   present during visit today: Not Applicable.    Note:   Gave Benadryl 25 mg po for h/o pruritis with first infusion.  Slept during infusion.  Tolerated infusion without any difficulties.    Intravenous Access:  Peripheral IV placed.    Treatment Conditions:  Not Applicable.      Post Infusion Assessment:  Patient tolerated infusion without incident.  Blood return noted pre and post infusion.  Site patent and intact, free from redness, edema or discomfort.  No evidence of extravasations.  Access discontinued per protocol.       Discharge Plan:   Patient completed 3 of 3 Venofer infusions.  Does not need future infusions at this time.   Patient discharged in stable condition accompanied by: self.  Departure Mode: Ambulatory.    Monie New, RN, RN

## 2019-09-19 ENCOUNTER — PRENATAL OFFICE VISIT (OUTPATIENT)
Dept: OBGYN | Facility: CLINIC | Age: 39
End: 2019-09-19
Payer: COMMERCIAL

## 2019-09-19 VITALS
HEART RATE: 91 BPM | OXYGEN SATURATION: 100 % | DIASTOLIC BLOOD PRESSURE: 81 MMHG | WEIGHT: 293 LBS | BODY MASS INDEX: 49.98 KG/M2 | SYSTOLIC BLOOD PRESSURE: 121 MMHG

## 2019-09-19 DIAGNOSIS — O09.899 SUPERVISION OF OTHER HIGH RISK PREGNANCY, ANTEPARTUM: ICD-10-CM

## 2019-09-19 DIAGNOSIS — R82.71 BACTERIURIA DURING PREGNANCY IN SECOND TRIMESTER: ICD-10-CM

## 2019-09-19 DIAGNOSIS — O99.891 BACTERIURIA DURING PREGNANCY IN SECOND TRIMESTER: ICD-10-CM

## 2019-09-19 DIAGNOSIS — Z32.01 PREGNANCY CONFIRMED BY POSITIVE URINE TEST: ICD-10-CM

## 2019-09-19 DIAGNOSIS — O34.219 PREVIOUS CESAREAN DELIVERY, ANTEPARTUM CONDITION OR COMPLICATION: ICD-10-CM

## 2019-09-19 PROCEDURE — 87086 URINE CULTURE/COLONY COUNT: CPT | Performed by: OBSTETRICS & GYNECOLOGY

## 2019-09-19 PROCEDURE — 99207 ZZC PRENATAL VISIT: CPT | Performed by: OBSTETRICS & GYNECOLOGY

## 2019-09-19 RX ORDER — PRENATAL VIT/IRON FUM/FOLIC AC 27MG-0.8MG
1 TABLET ORAL DAILY
Qty: 90 TABLET | Refills: 3 | Status: SHIPPED | OUTPATIENT
Start: 2019-09-19 | End: 2020-02-21

## 2019-09-19 NOTE — PATIENT INSTRUCTIONS
If you have any questions regarding your visit, Please contact your care team.     [employer]: [phone]  Women s Health CLINIC HOURS TELEPHONE NUMBER       Jules Barclay M.D.      Malgorzata Gonzales-Medical Assistant     Monday-[place]  8:00a.m-4:45 p.m  Tuesday-[place]  9:00a.m-4:00 p.m  Wednesday-[place] 8:00a.m-4:45 p.m.  Thursday-[place]  8:00a.m-4:45 p.m.  Friday-[place]  8:00a.m-4:45 p.m. [facility]  [address] [place] [address]  [phone] ask [facility]  [phone] Fax  Imaging [phone]    [facility]  [address] [place] [address] [place] [address]  [phone]    [facility]  [address]  [place] [address]  [phone] [facility]  [phone] Fax  Imaging [phone]     Urgent Care locations:    [place]        [place] Monday-Friday  5 pm - 9 pm  Saturday and Sunday   9 am - 5 pm  Monday-Friday   11 am - 9 pm  Saturday and Sunday   9 am - 5 pm   [phone]   If you need a medication refill, please contact your pharmacy. Please allow 3 business days for your refill to be completed.  As always, Thank you for trusting us with your healthcare needs!

## 2019-09-20 LAB
BACTERIA SPEC CULT: NORMAL
SPECIMEN SOURCE: NORMAL

## 2019-09-20 NOTE — PROGRESS NOTES
No significant signs, symptoms or concerns except some persistent right areola sensitivity and will start Bactroban. Interval axillary lump resolved. Medical insurance in place and completed IV iron and feels better. Plans to re-connect with diabetes team and working at diet. Repeat  section planned and to consider TL. Has fetal Echo planned. Advice appropriate to gestational age reviewed including pertinent Checklist items. Discussed condition, tests and care plan including RBA. Problem list updated. Hgb next.  A/P:  Tiffany was seen today for prenatal care.    Diagnoses and all orders for this visit:    Supervision of other high risk pregnancy, antepartum  -     Urine Culture Aerobic Bacterial    Bacteriuria during pregnancy in second trimester  -     Urine Culture Aerobic Bacterial    Pregnancy confirmed by positive urine test  -     Prenatal Vit-Fe Fumarate-FA (PRENATAL MULTIVITAMIN W/IRON) 27-0.8 MG tablet; Take 1 tablet by mouth daily    Previous  delivery, antepartum condition or complication        Total encounter time (physician together with patient) = 15min. Direct counseling, education and care coordination time (physician together with patient) = 10min.  Jules Barclay MD

## 2019-10-24 ENCOUNTER — PRENATAL OFFICE VISIT (OUTPATIENT)
Dept: OBGYN | Facility: CLINIC | Age: 39
End: 2019-10-24
Payer: COMMERCIAL

## 2019-10-24 VITALS
BODY MASS INDEX: 50.95 KG/M2 | OXYGEN SATURATION: 99 % | WEIGHT: 293 LBS | HEART RATE: 101 BPM | DIASTOLIC BLOOD PRESSURE: 88 MMHG | SYSTOLIC BLOOD PRESSURE: 138 MMHG

## 2019-10-24 DIAGNOSIS — O34.219 PREVIOUS CESAREAN DELIVERY, ANTEPARTUM CONDITION OR COMPLICATION: ICD-10-CM

## 2019-10-24 DIAGNOSIS — O09.899 SUPERVISION OF OTHER HIGH RISK PREGNANCY, ANTEPARTUM: ICD-10-CM

## 2019-10-24 LAB
ERYTHROCYTE [DISTWIDTH] IN BLOOD BY AUTOMATED COUNT: 16.5 % (ref 10–15)
HCT VFR BLD AUTO: 36.1 % (ref 35–47)
HGB BLD-MCNC: 11.3 G/DL (ref 11.7–15.7)
MCH RBC QN AUTO: 27.6 PG (ref 26.5–33)
MCHC RBC AUTO-ENTMCNC: 31.3 G/DL (ref 31.5–36.5)
MCV RBC AUTO: 88 FL (ref 78–100)
PLATELET # BLD AUTO: 143 10E9/L (ref 150–450)
RBC # BLD AUTO: 4.1 10E12/L (ref 3.8–5.2)
WBC # BLD AUTO: 7.9 10E9/L (ref 4–11)

## 2019-10-24 PROCEDURE — 36415 COLL VENOUS BLD VENIPUNCTURE: CPT | Performed by: OBSTETRICS & GYNECOLOGY

## 2019-10-24 PROCEDURE — 99207 ZZC PRENATAL VISIT: CPT | Performed by: OBSTETRICS & GYNECOLOGY

## 2019-10-24 PROCEDURE — 85027 COMPLETE CBC AUTOMATED: CPT | Performed by: OBSTETRICS & GYNECOLOGY

## 2019-10-24 NOTE — PROGRESS NOTES
No significant signs, symptoms or concerns except had ED and Labor and Delivery neg evaluation yest for fatigue and pelvic heaviness. Plans to stop work soon. Hemorrhoids with occasional bleeding. Plan ultrasound for fetal growth here at 32 weeks and fetal surveillance thereafter. Desires TL at repeat  section and consent signed today. Plan repeat ML incision. Education visit soon for type 2 DM.    Total encounter time (physician together with patient) = 15min. Direct counseling, education and care coordination time (physician together with patient) = 10min. This counseling included the following: Advice appropriate to gestational age reviewed including pertinent Checklist items. Discussed condition, tests and care plan including risks, benefits, and alternatives. Problem list updated.   A/P:  Tiffany was seen today for prenatal care.    Diagnoses and all orders for this visit:    Supervision of other high risk pregnancy, antepartum  -     CBC with platelets  -     Joann-Operative Worksheet    Previous  delivery, antepartum condition or complication  -     Joann-Operative Worksheet        Jules Barclay MD

## 2019-10-24 NOTE — PATIENT INSTRUCTIONS
If you have any questions regarding your visit, Please contact your care team.     [employer]: [phone]  Women s Health CLINIC HOURS TELEPHONE NUMBER       Jules Barclay M.D.      Malgorzata Gonzales-Medical Assistant     Monday-[place]  8:00a.m-4:45 p.m  Tuesday-[place]  9:00a.m-4:00 p.m  Wednesday-[place] 8:00a.m-4:45 p.m.  Thursday-[place]  8:00a.m-4:45 p.m.  Friday-[place]  8:00a.m-4:45 p.m. [facility]  [address] [place] [address]  [phone] ask [facility]  [phone] Fax  Imaging [phone]    [facility] Labor and Delivery  [address] [place] [address] [place] [address]  [phone]    [facility]  [address]  [place] [address]  [phone] [facility]  [phone] Fax  Imaging [phone]     Urgent Care locations:    [place]        [place] Monday-Friday  5 pm - 9 pm  Saturday and Sunday   9 am - 5 pm  Monday-Friday   11 am - 9 pm  Saturday and Sunday   9 am - 5 pm   [phone]   If you need a medication refill, please contact your pharmacy. Please allow 3 business days for your refill to be completed.  As always, Thank you for trusting us with your healthcare needs!

## 2019-10-28 ENCOUNTER — TELEPHONE (OUTPATIENT)
Dept: OBGYN | Facility: CLINIC | Age: 39
End: 2019-10-28

## 2019-10-28 NOTE — TELEPHONE ENCOUNTER
Left message on machine to call back  Sasha Silvestre, [facility]  October 28, 2019 11:54 AM

## 2019-10-28 NOTE — TELEPHONE ENCOUNTER
Surgery Pre-Certification    Medical Record Number: [id number]  Tiffany Sanz  YOB: 1980   Phone: [phone] (home)   Primary Provider: No Ref-Primary, Physician    Reason for Admit:  Repeat  and elective sterilization    Surgeon: Jules Barclay MD  Surgical Procedure: , bilateral partial salpingectomy  ICD-9 Coded: O34.219 and Z30.2  Date of Surgery: 20  Consent signed? Yes    Date signed: 10/24/19  Hospital: [facility]  Inpatient- Length of stay:  3 days.    Requestor:  Sasha Silvestre CMA     Location:  [facility] [phone]      Surgery Scheduled.    Date of Surgery 20 Time of Surgery 7:30  Procedure: repeat , elective sterilization  Hospital/Surgical Facility: [facility]  Surgeon: Dr. Barclay  Type of Anesthesia Anticipated: Spinal  Pre-op: to be completed by physician at an OB visit  6 week post op 3/3/20 with Dr. Barclay at 10:00  Pre-certification 10/28/19  Consent signed: yes  Hospital Stay yes        surgery packet mailed to patient's home address.  Patient instructed NPO 10 hours prior to surgery, arrive 1.5 hours prior to surgery.  Patient understood and agrees to the plan.      Sasha Silvestre CMA        Procedure name(s) - multi select  Delivery, bilateral partial salpingectomy    Reason for procedure repeat , elective sterilization    Surgeon: Ning    Is this a multi surgeon case? No    Laterality N/A    Request for additional equipment  None   Anesthesia Spinal    Positioning (if different from preference card): Supine    Is the patient known to have any of the following? Diabetes    Initiate Pre-op orders for above procedure: Yes, as ordered in Epic additional orders noted there also   Location of Case: [facility]    Sterilization or Hysterectomy consent signed in advance: Yes (note date signed in comments) 10/24/19   Operating room  requested: Yes    Urgency of Surgery:  Routine    Surgeon Procedure Time (incision to closure) in minutes (per procedure as applicable) 60    Note:  Surgical Case Time Needed (in minutes)   Surgery Date: 39-40 weeks 1-20-20   Patient Class (for admit prior to surgery, specify number of days in comments): Surgery admit admit day of surgery   Length of Stay: 3 days    H&P To Be Completed By: Surgeon    Where is the note? In EpicProgress Note     needed? No    Post-Op Appointment 6 weeks    Office visit with surgeon prior to surgery: preop at future OB visit

## 2019-10-30 ENCOUNTER — TELEPHONE (OUTPATIENT)
Dept: EDUCATION SERVICES | Facility: CLINIC | Age: 39
End: 2019-10-30

## 2019-10-30 NOTE — TELEPHONE ENCOUNTER
"Reason for Call:  Other appointment and call back    Detailed comments: Patient had to cancel today's appointment due to an \"emergency with my child.\"  She is requesting to be fit into a schedule ASAP as she states she is not feeling well and she already had to wait for this appointment and states the middle of November is too far out.  Please call to schedule.    Phone Number Patient can be reached at:  [phone] (home)    Best Time: Any    Can we leave a detailed message on this number? YES    Call taken on 10/30/2019 at 10:21 AM by Yecenia Damon    "

## 2019-11-14 ENCOUNTER — PRENATAL OFFICE VISIT (OUTPATIENT)
Dept: OBGYN | Facility: CLINIC | Age: 39
End: 2019-11-14
Payer: COMMERCIAL

## 2019-11-14 VITALS
BODY MASS INDEX: 51.27 KG/M2 | SYSTOLIC BLOOD PRESSURE: 121 MMHG | TEMPERATURE: 97.9 F | WEIGHT: 293 LBS | DIASTOLIC BLOOD PRESSURE: 78 MMHG | HEART RATE: 95 BPM

## 2019-11-14 DIAGNOSIS — O09.899 SUPERVISION OF OTHER HIGH RISK PREGNANCY, ANTEPARTUM: ICD-10-CM

## 2019-11-14 PROCEDURE — 99207 ZZC PRENATAL VISIT: CPT | Performed by: OBSTETRICS & GYNECOLOGY

## 2019-11-14 NOTE — PATIENT INSTRUCTIONS
If you have any questions regarding your visit, Please contact your care team.     [employer]: [phone]  [facility] CLINIC HOURS TELEPHONE NUMBER       Jules Barclay M.D.      Lola-REA Camposi-Medical Assistant     Monday-[place]  8:00a.m-4:45 p.m  Tuesday-[place]  9:00a.m-4:00 p.m  Wednesday-[place] 8:00a.m-4:45 p.m.  Thursday-[place]  8:00a.m-4:45 p.m.  Friday-[place]  8:00a.m-4:45 p.m. [facility]  [address]  [place], [place] [address] [phone] ask [facility]  [phone] Fax  Imaging [phone]    [facility] Labor and Delivery  [address] [place] [address] Dr.  [place], [place] [address] [phone]    [facility]  [address] [place]  [place] [address]  [phone] ask [facility]  [phone] Fax  Imaging [phone]     Urgent Care locations:    [place]        [place] Monday-Friday  5 pm - 9 pm  Saturday and Sunday   9 am - 5 pm  Monday-Friday   11 am - 9 pm  Saturday and Sunday   9 am - 5 pm   [phone]   If you need a medication refill, please contact your pharmacy. Please allow 3 business days for your refill to be completed.  As always, Thank you for trusting us with your healthcare needs!

## 2019-11-15 NOTE — PROGRESS NOTES
No significant signs, symptoms or concerns except viral bronchitis with cough treated at ED. Needs follow-up for type 2 DM.    Total encounter time (physician together with patient) = 15min. Direct counseling, education and care coordination time (physician together with patient) = 10min. This counseling included the following: Advice appropriate to gestational age reviewed including pertinent Checklist items. Discussed condition, tests and care plan including risks, benefits, and alternatives. Problem list updated. Ultrasound follow-up next.  A/P:  Tiffany was seen today for prenatal care.    Diagnoses and all orders for this visit:    Supervision of other high risk pregnancy, antepartum  -     US OB > 14 Weeks; Future        Jules Barclay MD

## 2019-11-19 ENCOUNTER — ALLIED HEALTH/NURSE VISIT (OUTPATIENT)
Dept: EDUCATION SERVICES | Facility: CLINIC | Age: 39
End: 2019-11-19
Payer: COMMERCIAL

## 2019-11-19 VITALS — BODY MASS INDEX: 51.11 KG/M2 | WEIGHT: 293 LBS

## 2019-11-19 DIAGNOSIS — O24.419: Primary | ICD-10-CM

## 2019-11-19 PROCEDURE — G0108 DIAB MANAGE TRN  PER INDIV: HCPCS

## 2019-11-19 NOTE — PATIENT INSTRUCTIONS
1. Check glucose 4 times daily, before breakfast daily and 1 hour after each meal, as recommended.    2. Check ketones daily or once a week after they have been negative for 7 days in a row. If ketones are positive, let your diabetes educator know and continue to check daily until they are negative for 7 days in a row.    3. Continue with recommended physical activity.    4. Continue to follow recommended meal plan: 2-3 carbs at breakfast, 3-4 carbs at lunch, 3-4 carbs at supper, 1-2 carbs at snacks.  Follow consistent CHO meal plan, eat CHO and protein/fat at all meals/snacks.    5. Follow-up with OB doctor as recommended.    6. Call/e-mail diabetes educator if 3 or more blood sugars are above the goal in 1 week or if ketones are positive.       AFTER YOU DELIVER:  - Continue with healthy eating and physical activity to get back to your pre-pregnancy weight.   - Have a follow-up 2-hour Glucose Tolerance Test at your 6-week post-partum check-up.   - Have your fasting blood sugar checked once a year.  - Plan ahead for future pregnancies - eat healthy, keep active, work with your doctor to check for gestational diabetes early on in the pregnancy and check blood sugars as recommended by your doctor.     Mariya Lemon, RD, LD, CDE

## 2019-11-19 NOTE — Clinical Note
For triage on Friday- could you call Tiffany if she does not send in her blood sugars by noon? Thanks!Mariya

## 2019-11-19 NOTE — Clinical Note
Dr. Barclay,Michael Allen came in for GDM follow-up today. She has not been testing BG, only 9 test results in the last month and only 1 in the last 2 weeks.  She agrees to start testing more. I helped her get a [employer] account and showed her how to send a message. She will follow-up with us this Friday. I have a feeling she will need insulin, but we will see what her blood sugar shows the next couple of days. Thanks,Mariya Lemon, RD, LD, CDE

## 2019-11-19 NOTE — PROGRESS NOTES
Diabetes Self-Management Education & Support  Gestational Diabetes Self-Management Education & Support    SUBJECTIVE/OBJECTIVE:  Presents for education related to gestational diabetes.    Accompanied by: Self  Diabetes management related comments/concerns: Tiffany had GDM with her last pregnancy, which was about 2 years ago    Cultural Influences/Ethnic Background:  American    Wt 144.2 kg (318 lb)   LMP 2019 (Approximate)   BMI 51.11 kg/m      Unknown pre-pregnancy weight    Estimated Date of Delivery: 2020    Blood Glucose/Ketone Log:         Lifestyle and Health Behaviors:  Pre-pregnancy weight (lbs): 280  Exercise:: Currently not exercising  Days per week of moderate to strenuous exercise (like a brisk walk): 0  Meals include: Breakfast, Lunch, Dinner, Snacks(hard to eat fruit, but she likes melons)  Beverages: Water  Cultural/Rastafari diet restrictions?: No  Biggest challenges to healthy eating: Other(foods are making her nauseas)  Pre- vitamin?: Yes    Breakfast: Oatmeal with peanut butter and carnation sweet milk OR scrambled eggs with 1-2 pieces of toast or english muffins   Lunch: roasted or baked chicken with rice   Dinner: same as lunch    Snacks: fruits     Often just has just 2 meals during the day, and will snack    nauseous with grapes and apples and watermelon     Healthy Coping:     Current Management:  Taking medications for gestational diabetes?: No  Difficulty affording diabetes management supplies?: No    ASSESSMENT:  Ketones: not testing.   Tiffany has not been consistently testing her blood sugar, her last fasting reading was taken about 3 weeks ago and she only has 2 fasting readings in the last month. She has also tested her after meal readings sparingly, although she has had a couple in range- 131, 111, 135. She also had some high readings: 193, 171, 141. She tells writer that she hasn't been testing because she thought her numbers were in a healthy range.  Writer explained  the importance of testing BG even if they are healthy.   Tiffany agrees to start testing again.    Tiffany has been having a hard time following the meal plan. She states that she doesn't do any carb counting- she just knows what to eat. She doesn't eat at consistent times, she admits that she often forgets to eat and then is really hungry and will eat a large meal.     Discussed the potential need for insulin, explained that we can't start insulin without some blood sugar readings.  Tiffany used insulin with her last pregnancy and is comfortable doing the injections.  She will test 4 times/day and send in BG log on Friday via [employer] (signed patient up for [employer] today and showed her how to send a message to me) and will determine insulin needs at that time.      INTERVENTION:  Educational topics covered today:  We discussed carbohydrate counting and recommended meal plan, discussed importance of testing BG for the rest of pregnancy to reduce any risks, when to Call a Diabetes Educator or OB Provider    Educational Materials provided today:  Understanding Gestational Diabetes    PLAN:  Check glucose 4 times daily.  Check ketones once a week when readings are consistently negative.  Continue with recommended physical activity.  Continue to follow recommended meal plan: 2-3 carbs at breakfast, 3-4 carbs at lunch, 3-4 carbs at supper, 1-2 carbs at snacks.  Follow consistent CHO meal plan, eat CHO and protein/fat at all meals/snacks.    Call/e-mail/[employer] message diabetes educator if 3 or more blood sugars are above the goal in 1 week or if ketones are positive.    Mariya Lemon RD, LD, CDE  Time Spent: 45 minutes  Encounter Type: Individual    Any diabetes medication dose changes were made via the CDE Protocol and Collaborative Practice Agreement with the patient's referring provider. A copy of this encounter was shared with the provider.

## 2019-11-20 NOTE — TELEPHONE ENCOUNTER
Aubrey patient with information on the surgery- Tried several times to contact patient - packet with info was already sent too.  Matilde Carrasco M.A.

## 2019-11-22 ENCOUNTER — PATIENT OUTREACH (OUTPATIENT)
Dept: EDUCATION SERVICES | Facility: CLINIC | Age: 39
End: 2019-11-22

## 2019-11-27 ENCOUNTER — PRENATAL OFFICE VISIT (OUTPATIENT)
Dept: OBGYN | Facility: CLINIC | Age: 39
End: 2019-11-27
Payer: COMMERCIAL

## 2019-11-27 ENCOUNTER — TELEPHONE (OUTPATIENT)
Dept: OBGYN | Facility: CLINIC | Age: 39
End: 2019-11-27

## 2019-11-27 VITALS
DIASTOLIC BLOOD PRESSURE: 70 MMHG | BODY MASS INDEX: 51.11 KG/M2 | HEART RATE: 93 BPM | SYSTOLIC BLOOD PRESSURE: 129 MMHG | TEMPERATURE: 97.9 F | WEIGHT: 293 LBS

## 2019-11-27 DIAGNOSIS — O09.899 SUPERVISION OF OTHER HIGH RISK PREGNANCY, ANTEPARTUM: ICD-10-CM

## 2019-11-27 PROCEDURE — 99207 ZZC PRENATAL VISIT: CPT | Performed by: OBSTETRICS & GYNECOLOGY

## 2019-11-27 NOTE — PATIENT INSTRUCTIONS
If you have any questions regarding your visit, Please contact your care team.     [employer]: [phone]  [facility] CLINIC HOURS TELEPHONE NUMBER       Jules Barclay M.D.      Lola-REA Camposi-Medical Assistant     Monday-[place]  8:00a.m-4:45 p.m  Tuesday-[place]  9:00a.m-4:00 p.m  Wednesday-[place] 8:00a.m-4:45 p.m.  Thursday-[place]  8:00a.m-4:45 p.m.  Friday-[place]  8:00a.m-4:45 p.m. [facility]  [address]  [place], [place] [address] [phone] ask [facility]  [phone] Fax  Imaging [phone]    [facility] Labor and Delivery  [address] [place] [address] Dr.  [place], [place] [address] [phone]    [facility]  [address] [place]  [place] [address]  [phone] ask [facility]  [phone] Fax  Imaging [phone]     Urgent Care locations:    [place]        [place] Monday-Friday  5 pm - 9 pm  Saturday and Sunday   9 am - 5 pm  Monday-Friday   11 am - 9 pm  Saturday and Sunday   9 am - 5 pm   [phone]   If you need a medication refill, please contact your pharmacy. Please allow 3 business days for your refill to be completed.  As always, Thank you for trusting us with your healthcare needs!

## 2019-11-27 NOTE — TELEPHONE ENCOUNTER
Patient calling nurse triage line today, she is 39 year old female, , currently 31 weeks 3 days.     She is scheduled to see Dr. Barclay at 1015 am this morning. Patient states this is a high risk pregnancy. Patient is of AMA, has gestational diabetes, hemorrhoids with occasional bleeding. Planned ultrasound for fetal growth scheduled on 19. Plans for repeat  section.    Patient states that yesterday she was experiencing contractions all day, she did eat something and rested by laying down. This slowed them down a little bit. She was able to work through them yesterday while at work, and then they were coming about every 5 minutes last night. With contractions patient's abdomen would tighten but she had no low back pain. She did not try taking a warm bath at all. Just decided it was best to lay down. Movement didn't stop the contractions, she ate this morning and something light last night, that slowed them down a little bit. She states she has been drinking an adequate amount of water. Patient cannot tell if she is leaking any fluid, she is changing out her underwear three times a day, but can't describe consistency of leaked substance, whether water-like or jell-like. She did have increased pressure yesterday in pelvis and groin, not as bad today.     RN advised that she keep her appointment with Dr. Barclay today for further evaluation and if he feels she needs to be seen in L&D he will send her there.     Patient verbalized understanding and agreed to plan.     Lola Francis RN on 2019 at 10:00 AM

## 2019-11-27 NOTE — PROGRESS NOTES
No significant signs, symptoms or concerns except had contractions last night after missing a meal and now resolved. Glc levels under fair control when checked on diet- advised consistency and follow-up. Working casual hours now. Here with children.    Total encounter time (physician together with patient) = 15min. Direct counseling, education and care coordination time (physician together with patient) = 10min. This counseling included the following: Advice appropriate to gestational age reviewed including pertinent Checklist items. Discussed condition, tests and care plan including risks, benefits, and alternatives. Problem list updated. Ultrasound follow-up for growth soon.   A/P:  Tiffany was seen today for prenatal care.    Diagnoses and all orders for this visit:    Supervision of other high risk pregnancy, antepartum        Julse Barclay MD

## 2019-12-02 ENCOUNTER — ANCILLARY PROCEDURE (OUTPATIENT)
Dept: ULTRASOUND IMAGING | Facility: CLINIC | Age: 39
End: 2019-12-02
Attending: OBSTETRICS & GYNECOLOGY
Payer: COMMERCIAL

## 2019-12-02 DIAGNOSIS — O09.899 SUPERVISION OF OTHER HIGH RISK PREGNANCY, ANTEPARTUM: ICD-10-CM

## 2019-12-02 PROCEDURE — 76805 OB US >/= 14 WKS SNGL FETUS: CPT

## 2019-12-10 ENCOUNTER — PRENATAL OFFICE VISIT (OUTPATIENT)
Dept: OBGYN | Facility: CLINIC | Age: 39
End: 2019-12-10
Payer: COMMERCIAL

## 2019-12-10 VITALS
WEIGHT: 293 LBS | TEMPERATURE: 97.6 F | DIASTOLIC BLOOD PRESSURE: 85 MMHG | HEART RATE: 97 BPM | BODY MASS INDEX: 51.75 KG/M2 | SYSTOLIC BLOOD PRESSURE: 139 MMHG

## 2019-12-10 DIAGNOSIS — O09.899 SUPERVISION OF OTHER HIGH RISK PREGNANCY, ANTEPARTUM: ICD-10-CM

## 2019-12-10 DIAGNOSIS — O24.419 GESTATIONAL DIABETES MELLITUS (GDM) IN FIRST TRIMESTER, GESTATIONAL DIABETES METHOD OF CONTROL UNSPECIFIED: Primary | ICD-10-CM

## 2019-12-10 DIAGNOSIS — Z23 NEED FOR TDAP VACCINATION: ICD-10-CM

## 2019-12-10 PROCEDURE — 99207 ZZC PRENATAL VISIT: CPT | Performed by: OBSTETRICS & GYNECOLOGY

## 2019-12-10 PROCEDURE — 90471 IMMUNIZATION ADMIN: CPT | Performed by: OBSTETRICS & GYNECOLOGY

## 2019-12-10 PROCEDURE — 90715 TDAP VACCINE 7 YRS/> IM: CPT | Performed by: OBSTETRICS & GYNECOLOGY

## 2019-12-10 NOTE — PROGRESS NOTES
No significant signs, symptoms or concerns except had Labor and Delivery evaluation for decreased FM and pelvic pressure. Glc levels higher when checked and ultrasound showed LGA and upper normal AFV. Encouraged compliance and follow-up. BPP weekly starting next. Stable benign appearing 1 cm oval right breast areolar subcutaneous nodule. Bactroban not used and not helpful now- observe. Some hair loss and intertriginous mild rash. Tdap given.   Total encounter time (physician together with patient) = 15min. Direct counseling, education and care coordination time (physician together with patient) = 10min. This counseling included the following: Advice appropriate to gestational age reviewed including pertinent Checklist items. Discussed condition, tests and care plan including risks, benefits, and alternatives. Problem list updated.   A/P:  Tiffany was seen today for prenatal care.    Diagnoses and all orders for this visit:    Gestational diabetes mellitus (GDM) in first trimester, gestational diabetes method of control unspecified  -     US Fetal Biophys Prof w/o Non Stress Test; Standing    Supervision of other high risk pregnancy, antepartum  -     US Fetal Biophys Prof w/o Non Stress Test; Standing    Need for Tdap vaccination  -     TDAP VACCINE  -     VACCINE ADMINISTRATION, INITIAL        Jules Barclay MD

## 2019-12-10 NOTE — NURSING NOTE
Prior to immunization administration, verified patients identity using patient s name and date of birth. Please see Immunization Activity for additional information.     Screening Questionnaire for Adult Immunization    Are you sick today?   No   Do you have allergies to medications, food, a vaccine component or latex?   No   Have you ever had a serious reaction after receiving a vaccination?   No   Do you have a long-term health problem with heart disease, lung disease, asthma, kidney disease, metabolic disease (e.g. diabetes), anemia, or other blood disorder?   No   Do you have cancer, leukemia, HIV/AIDS, or any other immune system problem?   No   In the past 3 months, have you taken medications that affect  your immune system, such as prednisone, other steroids, or anticancer drugs; drugs for the treatment of rheumatoid arthritis, Crohn s disease, or psoriasis; or have you had radiation treatments?   No   Have you had a seizure, or a brain or other nervous system problem?   No   During the past year, have you received a transfusion of blood or blood     products, or been given immune (gamma) globulin or antiviral drug?   No   For women: Are you pregnant or is there a chance you could become        pregnant during the next month?   Yes   Have you received any vaccinations in the past 4 weeks?   No     Immunization questionnaire Established pregnancy patient.        Per orders of Dr. Barclay, injection of Tdap given by Janet Yang CMA. Patient instructed to remain in clinic for 15 minutes afterwards, and to report any adverse reaction to me immediately.       Screening performed by Janet Yang CMA on 12/10/2019 at 10:20 AM.

## 2019-12-10 NOTE — PROGRESS NOTES
Immunization History   Administered Date(s) Administered     Flu, Unspecified 11/05/2013, 12/15/2014     Influenza (IIV3) PF 11/05/2013, 12/15/2014     Influenza Vaccine IM > 6 months Valent IIV4 12/15/2014, 03/10/2016     MMR 01/03/1995     Meningococcal (Menomune ) 04/03/1996     Meningococcal,unspecified 04/03/1996     OPV, trivalent, live 01/03/1995     Poliovirus, inactivated (IPV) 01/03/1995     TD (ADULT, 7+) 01/01/1995     TDAP Vaccine (Adacel) 03/03/2010, 12/10/2019     TDAP Vaccine (Boostrix) 06/13/2011, 12/20/2016     Yellow Fever 04/03/1996

## 2019-12-10 NOTE — PATIENT INSTRUCTIONS
If you have any questions regarding your visit, Please contact your care team.     [employer] [facility]: [phone]  [facility] CLINIC HOURS TELEPHONE NUMBER       Jules Barclay M.D.      Lola-REA Camposi-Medical Assistant     Monday-[place]  8:00a.m-4:45 p.m  Tuesday-[place]  9:00a.m-4:00 p.m  Wednesday-[place] 8:00a.m-4:45 p.m.  Thursday-[place]  8:00a.m-4:45 p.m.  Friday-[place]  8:00a.m-4:45 p.m. [facility]  [address]  [place], [place] [address] [phone] ask [facility]  [phone] Fax  Imaging [phone]    [facility] Labor and Delivery  [address] [place] [address] Dr.  [place], [place] [address] [phone]    [facility]  [address] [place]  [place] [address]  [phone] ask [facility]  [phone] Fax  Imaging [phone]     Urgent Care locations:    [place]        [place] Monday-Friday  5 pm - 9 pm  Saturday and Sunday   9 am - 5 pm  Monday-Friday   11 am - 9 pm  Saturday and Sunday   9 am - 5 pm   [phone]   If you need a medication refill, please contact your pharmacy. Please allow 3 business days for your refill to be completed.  As always, Thank you for trusting us with your healthcare needs!

## 2019-12-13 ENCOUNTER — PATIENT OUTREACH (OUTPATIENT)
Dept: EDUCATION SERVICES | Facility: CLINIC | Age: 39
End: 2019-12-13
Payer: COMMERCIAL

## 2019-12-17 ENCOUNTER — TELEPHONE (OUTPATIENT)
Dept: OBGYN | Facility: CLINIC | Age: 39
End: 2019-12-17

## 2019-12-17 NOTE — TELEPHONE ENCOUNTER
[facility] Call Center    Phone Message    May a detailed message be left on voicemail: yes    Reason for Call: Other: PAtient needing to schedule 2x/wk appointments. Usually sees Dr. Barclay but was told he is out of the office and no availability so she wants to schedule with the OBGYN who helped her at the [facility]. Was not able to locate/confirm which OBGYN that is, so asking dept to help her out. Also, wanting to know if she still needs to have an ultrasound that Dr. Barclay had discussed w her recently? Please call to advise.  Wanting to schedule ASAP so sending as urgent. Said she could go into birth as soon as this week?    Action Taken: Message routed to:  [facility] p [phone]

## 2019-12-18 ENCOUNTER — ANCILLARY PROCEDURE (OUTPATIENT)
Dept: ULTRASOUND IMAGING | Facility: CLINIC | Age: 39
End: 2019-12-18
Attending: OBSTETRICS & GYNECOLOGY
Payer: COMMERCIAL

## 2019-12-18 DIAGNOSIS — O24.419 GESTATIONAL DIABETES MELLITUS (GDM) IN FIRST TRIMESTER, GESTATIONAL DIABETES METHOD OF CONTROL UNSPECIFIED: ICD-10-CM

## 2019-12-18 DIAGNOSIS — O09.899 SUPERVISION OF OTHER HIGH RISK PREGNANCY, ANTEPARTUM: ICD-10-CM

## 2019-12-18 PROBLEM — O32.2XX0 TRANSVERSE OR OBLIQUE FETAL PRESENTATION, ANTEPARTUM: Status: ACTIVE | Noted: 2019-12-18

## 2019-12-18 PROBLEM — Z86.32 H/O GESTATIONAL DIABETES IN PRIOR PREGNANCY, CURRENTLY PREGNANT: Status: RESOLVED | Noted: 2019-05-24 | Resolved: 2019-12-18

## 2019-12-18 PROBLEM — O09.299 H/O GESTATIONAL DIABETES IN PRIOR PREGNANCY, CURRENTLY PREGNANT: Status: RESOLVED | Noted: 2019-05-24 | Resolved: 2019-12-18

## 2019-12-18 PROCEDURE — 76819 FETAL BIOPHYS PROFIL W/O NST: CPT

## 2019-12-18 NOTE — TELEPHONE ENCOUNTER
"Patient was last seen in clinic on 12/10/2019 with Dr. Barclay for a prenatal visit. Per OV notes, \"No significant signs, symptoms or concerns except had Labor and Delivery evaluation for decreased FM and pelvic pressure. Glc levels higher when checked and ultrasound showed LGA and upper normal AFV. Encouraged compliance and follow-up. BPP weekly starting next. Stable benign appearing 1 cm oval right breast areolar subcutaneous nodule. Bactroban not used and not helpful now- observe. Some hair loss and intertriginous mild rash. Tdap given.\"    Order for BPP in place. Patient has not yet scheduled this.     Patient was seen 5 days prior at [facility], per hospital notes,   \"Patient here for decrease in fetal movement and pelvic pressure at 32.4 weeks gestation. Patient is a  who states that she feels baby move but not as much as he usually does. Had a hard time finding baby on the monitors due to his active movement. Patient states that he is moving more now. Sent urine to lab for testing. Cervix was noted to be closed. NST on strip for baby was reactive.    Okay to discharge patient home per Dr. Barclay. Gave patient an abdominal binder to help with pelvic pressure during pregnancy.\"     Patient was again seen at [facility] on 2019 and assessed by Dr. Bojorquez. Per hospital notes, \"This 38 y/o female, , O+ blood type, EDC 20, presents to triage with vague complaints. She states that she just \"feels off\" but denies any visual changes, headache, or RUQ pain. She has a hx of gestational diabetes which is diet-controlled but has not been compliant with her glucose checks or calling her values in to her diabetic educator. She states that she ate a \"large\" breakfast this morning but since then has felt mildly nauseated. Fetal acitivity has been reassuring and she denies any uterine contractions or fluid leakage. Her last pregnancy was complicated by preeclampsia and GDM and she required a C/S for delivery. She is not " "sure of the type of C/S and her operative report is not available. She is scheduled for a repeat C/S with PPTL next month with Dr. Barclay.  BP (!) 110/59  Pulse 91  Temp 98.1  F (36.7  C)  Resp 16  Ht 5' 5\" (1.651 m)  SpO2 98%  Breastfeeding No  BMI 49.92 kg/m    CBC - hgb 11.6, plt count 114K (trending downward)  AST and ALT - normal  BUN - normal  Creatinine - normal  Protein:creatinine ratio - elevated at 0.37  UA - 100 protein, 10-14 WBC, bacteria present so will check a UC since the pt is asymptomatic  Abd - morbidly obese (BMI 47)  EFM - category 1 fhts  Cervix - not examined  Ext - DTRs +1/4 without clonus, +1 edema  Assessment - IUP at 34 2/7 weeks gestation - undelivered, proteinuria, diet-controlled gestational diabetes  Plan - I discussed her case with Dr. Kelley from [facility] since she does not meet the criteria for a diagnosis of preeclampsia or HELLP Syndrome but is at increased risk for developing either condition quickly. Therefore, he advised that she be seen at the clinic twice per week for BP checks as well as once per week for lab work until she \"declares herself.\" She was counseled to return to L&D if she develops preeclamptic symptoms and these were discussed with the patient at length. She is to be seen later this week at the clinic and she agreed to call today to schedule this appointment. He did not advise Betamethasone, even though her infant is male, because it could spike her glucose values. This was a 45-minute OB consult with over 50% of the time spent in direct patient consultation and a discussion with [facility].\"      RN recommends that patient have BPP scheduled and completed. If Dr. Barclay has no openings she can schedule with another provider if she is having further concerns with progression of her pregnancy. Can route to provider for further recommendations.     Lola Francis RN on 12/18/2019 at 8:55 AM      "

## 2019-12-18 NOTE — TELEPHONE ENCOUNTER
Called and spoke with PT and explained that the provider wants a US BPP it will get scheduled and PT got scheduled with her provider.  Rebeca Lugo, CMA

## 2019-12-20 ENCOUNTER — TELEPHONE (OUTPATIENT)
Dept: OBGYN | Facility: CLINIC | Age: 39
End: 2019-12-20

## 2019-12-20 ENCOUNTER — PRENATAL OFFICE VISIT (OUTPATIENT)
Dept: OBGYN | Facility: CLINIC | Age: 39
End: 2019-12-20
Payer: COMMERCIAL

## 2019-12-20 VITALS
TEMPERATURE: 97.5 F | BODY MASS INDEX: 51.27 KG/M2 | WEIGHT: 293 LBS | HEART RATE: 88 BPM | SYSTOLIC BLOOD PRESSURE: 130 MMHG | DIASTOLIC BLOOD PRESSURE: 84 MMHG

## 2019-12-20 DIAGNOSIS — O09.899 SUPERVISION OF OTHER HIGH RISK PREGNANCY, ANTEPARTUM: ICD-10-CM

## 2019-12-20 DIAGNOSIS — O32.2XX1: ICD-10-CM

## 2019-12-20 DIAGNOSIS — O24.419 GESTATIONAL DIABETES MELLITUS (GDM) IN FIRST TRIMESTER, GESTATIONAL DIABETES METHOD OF CONTROL UNSPECIFIED: ICD-10-CM

## 2019-12-20 PROCEDURE — 99207 ZZC PRENATAL VISIT: CPT | Performed by: OBSTETRICS & GYNECOLOGY

## 2019-12-20 NOTE — TELEPHONE ENCOUNTER
is not in clinic next week and needed to schedule with another provider.    Patient can possibly schedule in [place] or [place].    Janet Yang CMA

## 2019-12-20 NOTE — TELEPHONE ENCOUNTER
[facility] Call Center    Phone Message    May a detailed message be left on voicemail: yes    Reason for Call: Other: patient states she needs to be seen every week.  had no appt available next week, only dr. medina at 2:30, she stated she couldnt do that. that she needs a call back to be squeezed in next week with anyone.      Action Taken: Message routed to:  [facility] p [phone]

## 2019-12-20 NOTE — PATIENT INSTRUCTIONS
If you have any questions regarding your visit, Please contact your care team.     [employer] [facility]: [phone]  [facility] CLINIC HOURS TELEPHONE NUMBER       Jules Barclay M.D.      Lola-REA Camposi-Medical Assistant     Monday-[place]  8:00a.m-4:45 p.m  Tuesday-[place]  9:00a.m-4:00 p.m  Wednesday-[place] 8:00a.m-4:45 p.m.  Thursday-[place]  8:00a.m-4:45 p.m.  Friday-[place]  8:00a.m-4:45 p.m. [facility]  [address]  [place], [place] [address] [phone] ask [facility]  [phone] Fax  Imaging [phone]    [facility] Labor and Delivery  [address] [place] [address] Dr.  [place], [place] [address] [phone]    [facility]  [address] [place]  [place] [address]  [phone] ask [facility]  [phone] Fax  Imaging [phone]     Urgent Care locations:    [place]        [place] Monday-Friday  5 pm - 9 pm  Saturday and Sunday   9 am - 5 pm  Monday-Friday   11 am - 9 pm  Saturday and Sunday   9 am - 5 pm   [phone]   If you need a medication refill, please contact your pharmacy. Please allow 3 business days for your refill to be completed.  As always, Thank you for trusting us with your healthcare needs!

## 2019-12-21 NOTE — PROGRESS NOTES
No significant signs, symptoms or concerns except had interval Labor and Delivery evaluation this week for malaise and had isolated preeclampsia-range proteinuria and mild thrombocytopenia at 114K. Will follow labs, BPP, and patient weekly. Tired. Worked at proper diet and glucose levels improved. Here with son.   Total encounter time (physician together with patient) = 15min. Direct counseling, education and care coordination time (physician together with patient) = 10min. This counseling included the following: Advice appropriate to gestational age reviewed including pertinent Checklist items. Discussed condition, tests and care plan including risks, benefits, and alternatives. Problem list updated.   A/P:  Tiffany was seen today for prenatal care.    Diagnoses and all orders for this visit:    Transverse or oblique fetal presentation, antepartum, fetus 1 of multiple gestation    Supervision of other high risk pregnancy, antepartum  -     Protein  random urine with Creat Ratio; Future  -     ALT; Future  -     AST; Future  -     CBC with platelets; Future  -     Creatinine; Future    Gestational diabetes mellitus (GDM) in first trimester, gestational diabetes method of control unspecified        Jules Barclay MD

## 2019-12-23 ENCOUNTER — TELEPHONE (OUTPATIENT)
Dept: OBGYN | Facility: CLINIC | Age: 39
End: 2019-12-23

## 2019-12-23 ENCOUNTER — ANCILLARY PROCEDURE (OUTPATIENT)
Dept: ULTRASOUND IMAGING | Facility: CLINIC | Age: 39
End: 2019-12-23
Attending: OBSTETRICS & GYNECOLOGY
Payer: COMMERCIAL

## 2019-12-23 DIAGNOSIS — O24.419 GESTATIONAL DIABETES MELLITUS (GDM) IN FIRST TRIMESTER, GESTATIONAL DIABETES METHOD OF CONTROL UNSPECIFIED: ICD-10-CM

## 2019-12-23 DIAGNOSIS — O09.899 SUPERVISION OF OTHER HIGH RISK PREGNANCY, ANTEPARTUM: ICD-10-CM

## 2019-12-23 PROCEDURE — 76819 FETAL BIOPHYS PROFIL W/O NST: CPT

## 2019-12-23 NOTE — TELEPHONE ENCOUNTER
[facility] Call Center    Phone Message    May a detailed message be left on voicemail: yes    Reason for Call: Other: retunring call. please call back     Action Taken: Message routed to:  [facility] p [phone]

## 2019-12-23 NOTE — TELEPHONE ENCOUNTER
Left message on machine to call back  Sasha Silvestre, [facility]  December 23, 2019 9:40 AM

## 2019-12-23 NOTE — TELEPHONE ENCOUNTER
I called patient and scheduled her to see Delfina Salazar at the [facility] tomorrow.  Patient pleased.  Sasha Silvestre, JADYN  December 23, 2019 1:24 PM

## 2019-12-24 ENCOUNTER — PRENATAL OFFICE VISIT (OUTPATIENT)
Dept: OBGYN | Facility: CLINIC | Age: 39
End: 2019-12-24
Payer: COMMERCIAL

## 2019-12-24 VITALS
HEART RATE: 89 BPM | BODY MASS INDEX: 51.43 KG/M2 | SYSTOLIC BLOOD PRESSURE: 124 MMHG | WEIGHT: 293 LBS | DIASTOLIC BLOOD PRESSURE: 66 MMHG

## 2019-12-24 DIAGNOSIS — O09.899 SUPERVISION OF OTHER HIGH RISK PREGNANCY, ANTEPARTUM: ICD-10-CM

## 2019-12-24 LAB
ERYTHROCYTE [DISTWIDTH] IN BLOOD BY AUTOMATED COUNT: 15.5 % (ref 10–15)
HCT VFR BLD AUTO: 38.8 % (ref 35–47)
HGB BLD-MCNC: 12.2 G/DL (ref 11.7–15.7)
MCH RBC QN AUTO: 27.1 PG (ref 26.5–33)
MCHC RBC AUTO-ENTMCNC: 31.4 G/DL (ref 31.5–36.5)
MCV RBC AUTO: 86 FL (ref 78–100)
PLATELET # BLD AUTO: 109 10E9/L (ref 150–450)
RBC # BLD AUTO: 4.51 10E12/L (ref 3.8–5.2)
WBC # BLD AUTO: 7.5 10E9/L (ref 4–11)

## 2019-12-24 PROCEDURE — 99207 ZZC COMPLICATED OB VISIT: CPT | Performed by: ADVANCED PRACTICE MIDWIFE

## 2019-12-24 PROCEDURE — 84156 ASSAY OF PROTEIN URINE: CPT | Performed by: OBSTETRICS & GYNECOLOGY

## 2019-12-24 PROCEDURE — 84450 TRANSFERASE (AST) (SGOT): CPT | Performed by: OBSTETRICS & GYNECOLOGY

## 2019-12-24 PROCEDURE — 82565 ASSAY OF CREATININE: CPT | Performed by: OBSTETRICS & GYNECOLOGY

## 2019-12-24 PROCEDURE — 84460 ALANINE AMINO (ALT) (SGPT): CPT | Performed by: OBSTETRICS & GYNECOLOGY

## 2019-12-24 PROCEDURE — 36415 COLL VENOUS BLD VENIPUNCTURE: CPT | Performed by: OBSTETRICS & GYNECOLOGY

## 2019-12-24 PROCEDURE — 85027 COMPLETE CBC AUTOMATED: CPT | Performed by: OBSTETRICS & GYNECOLOGY

## 2019-12-24 NOTE — NURSING NOTE
"Chief Complaint   Patient presents with     Prenatal Care       Initial BP (!) 141/83 (BP Location: Right arm, Patient Position: Sitting, Cuff Size: Adult Large)   Pulse 89   Wt 145.2 kg (320 lb)   LMP 03/31/2019 (Approximate)   BMI 51.43 kg/m   Estimated body mass index is 51.43 kg/m  as calculated from the following:    Height as of 9/16/19: 1.68 m (5' 6.14\").    Weight as of this encounter: 145.2 kg (320 lb).  Medication Reconciliation: complete    Janice Duncan CMA    "

## 2019-12-24 NOTE — PROGRESS NOTES
Complains of being tired.   Not sleeping well and very busy with her children who have accompanied her today and are being obstreperous.  Has not been checking her blood sugars. Encouraged to begin this again.  States it is hard because she is so busy.  Will check labs today.  BPP yesterday 8/8.  Will schedule follow up for early next week and a BPP.   Boy, will circ, breast feed and having her tubes tied. A bit uncertain about peds.   No ha/new visual dist or epig pain.   RTC 6-7 days.   Delfina Cox, APRN, CNM

## 2019-12-25 LAB
CREAT UR-MCNC: 155 MG/DL
PROT UR-MCNC: 0.21 G/L
PROT/CREAT 24H UR: 0.13 G/G CR (ref 0–0.2)

## 2019-12-26 LAB
ALT SERPL W P-5'-P-CCNC: 23 U/L (ref 0–50)
AST SERPL W P-5'-P-CCNC: 18 U/L (ref 0–45)
CREAT SERPL-MCNC: 0.58 MG/DL (ref 0.52–1.04)
GFR SERPL CREATININE-BSD FRML MDRD: >90 ML/MIN/{1.73_M2}

## 2019-12-27 ENCOUNTER — ANCILLARY PROCEDURE (OUTPATIENT)
Dept: ULTRASOUND IMAGING | Facility: CLINIC | Age: 39
End: 2019-12-27
Attending: OBSTETRICS & GYNECOLOGY
Payer: COMMERCIAL

## 2019-12-27 DIAGNOSIS — O24.419 GESTATIONAL DIABETES MELLITUS (GDM) IN FIRST TRIMESTER, GESTATIONAL DIABETES METHOD OF CONTROL UNSPECIFIED: ICD-10-CM

## 2019-12-27 DIAGNOSIS — O09.899 SUPERVISION OF OTHER HIGH RISK PREGNANCY, ANTEPARTUM: ICD-10-CM

## 2019-12-27 PROCEDURE — 76819 FETAL BIOPHYS PROFIL W/O NST: CPT

## 2020-01-02 ENCOUNTER — PRENATAL OFFICE VISIT (OUTPATIENT)
Dept: OBGYN | Facility: CLINIC | Age: 40
End: 2020-01-02
Payer: COMMERCIAL

## 2020-01-02 ENCOUNTER — TELEPHONE (OUTPATIENT)
Dept: OBGYN | Facility: CLINIC | Age: 40
End: 2020-01-02

## 2020-01-02 VITALS
SYSTOLIC BLOOD PRESSURE: 125 MMHG | OXYGEN SATURATION: 98 % | DIASTOLIC BLOOD PRESSURE: 87 MMHG | TEMPERATURE: 98.2 F | HEART RATE: 86 BPM

## 2020-01-02 DIAGNOSIS — O09.899 SUPERVISION OF OTHER HIGH RISK PREGNANCY, ANTEPARTUM: ICD-10-CM

## 2020-01-02 DIAGNOSIS — O32.2XX0 TRANSVERSE OR OBLIQUE FETAL PRESENTATION, ANTEPARTUM, SINGLE OR UNSPECIFIED FETUS: ICD-10-CM

## 2020-01-02 DIAGNOSIS — O09.899 SUPERVISION OF OTHER HIGH RISK PREGNANCY, ANTEPARTUM: Primary | ICD-10-CM

## 2020-01-02 PROCEDURE — 99207 ZZC PRENATAL VISIT: CPT | Performed by: OBSTETRICS & GYNECOLOGY

## 2020-01-02 NOTE — PROGRESS NOTES
Arrived 45 min late but was seen. No significant signs, symptoms or concerns except stronger back pain, poor sleep, possible contractions. Not checking glucose regularly but normal when checked. To Labor and Delivery now to exclude labor and recheck preeclampsia tests there. BPP in 1-2 days.     Total encounter time (physician together with patient) = 15min. Direct counseling, education and care coordination time (physician together with patient) = 10min. This counseling included the following: Advice appropriate to gestational age reviewed including pertinent Checklist items. Discussed condition, tests and care plan including risks, benefits, and alternatives. Problem list updated.   A/P:  Tiffany was seen today for prenatal care.    Diagnoses and all orders for this visit:    Transverse or oblique fetal presentation, antepartum, single or unspecified fetus    Supervision of other high risk pregnancy, antepartum        Jules Barclay MD

## 2020-01-02 NOTE — TELEPHONE ENCOUNTER
Patient returned call to clinic and call center staff assisted with scheduling her in [place] tomorrow for labs, BPP and appointment with Dr. Agbeh.     Lola Francis RN on 1/2/2020 at 4:56 PM

## 2020-01-02 NOTE — TELEPHONE ENCOUNTER
Patient calling stating she spoke to nurse that told her she needed to be seen in clinic tomorrow with Dr. Barclay.     Staff reviewed and see not notes of any staff telling. RN paged on-call provider that saw patient today and spoke with her regarding recommendations. Based on L&D visit today and abnormal labs Dr. Bojorquez recommends-    Biophysical Profile tomorrow and   Repeat labs- AST, Creatine, ALT,  BUN, Uric acid, protein/creatinine ratio and CBC with platelets.    She should also be scheduled with provider that is available.     RN took verbal order for labs and Biophysical Profile orders were already in place from Dr. Barclay.     RN attempted to reach patient via phone to assist with scheduling.   Unable to reach patient via phone. Left message to call clinic back at [phone] and ask for [facility].     If patient calls back please assist with scheduling her for est. Prenatal visit, labs and BPP with imaging.    Lola Francis RN on 1/2/2020 at 4:40 PM

## 2020-01-02 NOTE — PATIENT INSTRUCTIONS
If you have any questions regarding your visit, Please contact your care team.     [facility]: [phone]  [facility] CLINIC HOURS TELEPHONE NUMBER       Jules Barclay M.D.      Lola-REA Camposi-Medical Assistant     Monday-[place]  8:00a.m-4:45 p.m  Tuesday-[place]  9:00a.m-4:00 p.m  Wednesday-[place] 8:00a.m-4:45 p.m.  Thursday-[place]  8:00a.m-4:45 p.m.  Friday-[place]  8:00a.m-4:45 p.m. [facility]  [address]  [place], [place] [address] [phone] ask [facility]  [phone] Fax  Imaging [phone]    [facility]  [address] [place] [address] Dr.  [place], [place] [address] [phone]    [facility]  [address] [place]  [place] [address]  [phone] ask [facility]  [phone] Fax  Imaging [phone]     Urgent Care locations:    [place]        [place] Monday-Friday  5 pm - 9 pm  Saturday and Sunday   9 am - 5 pm  Monday-Friday   11 am - 9 pm  Saturday and Sunday   9 am - 5 pm   [phone]   If you need a medication refill, please contact your pharmacy. Please allow 3 business days for your refill to be completed.  As always, Thank you for trusting us with your healthcare needs!

## 2020-01-03 ENCOUNTER — ANCILLARY PROCEDURE (OUTPATIENT)
Dept: ULTRASOUND IMAGING | Facility: CLINIC | Age: 40
End: 2020-01-03
Attending: OBSTETRICS & GYNECOLOGY
Payer: COMMERCIAL

## 2020-01-03 ENCOUNTER — PRENATAL OFFICE VISIT (OUTPATIENT)
Dept: OBGYN | Facility: CLINIC | Age: 40
End: 2020-01-03
Payer: COMMERCIAL

## 2020-01-03 ENCOUNTER — TELEPHONE (OUTPATIENT)
Dept: OBGYN | Facility: CLINIC | Age: 40
End: 2020-01-03

## 2020-01-03 VITALS
DIASTOLIC BLOOD PRESSURE: 82 MMHG | SYSTOLIC BLOOD PRESSURE: 140 MMHG | BODY MASS INDEX: 52.17 KG/M2 | WEIGHT: 293 LBS | HEART RATE: 83 BPM

## 2020-01-03 DIAGNOSIS — O09.899 SUPERVISION OF OTHER HIGH RISK PREGNANCY, ANTEPARTUM: ICD-10-CM

## 2020-01-03 DIAGNOSIS — O24.419 GESTATIONAL DIABETES MELLITUS (GDM) IN FIRST TRIMESTER, GESTATIONAL DIABETES METHOD OF CONTROL UNSPECIFIED: ICD-10-CM

## 2020-01-03 DIAGNOSIS — O32.2XX0 TRANSVERSE OR OBLIQUE FETAL PRESENTATION, ANTEPARTUM, SINGLE OR UNSPECIFIED FETUS: ICD-10-CM

## 2020-01-03 LAB
ALT SERPL W P-5'-P-CCNC: 25 U/L (ref 0–50)
AST SERPL W P-5'-P-CCNC: 19 U/L (ref 0–45)
BUN SERPL-MCNC: 8 MG/DL (ref 7–30)
CREAT SERPL-MCNC: 0.67 MG/DL (ref 0.52–1.04)
DIFFERENTIAL METHOD BLD: ABNORMAL
EOSINOPHIL # BLD AUTO: 0.1 10E9/L (ref 0–0.7)
EOSINOPHIL NFR BLD AUTO: 1 %
ERYTHROCYTE [DISTWIDTH] IN BLOOD BY AUTOMATED COUNT: 15.4 % (ref 10–15)
GFR SERPL CREATININE-BSD FRML MDRD: >90 ML/MIN/{1.73_M2}
HCT VFR BLD AUTO: 36 % (ref 35–47)
HGB BLD-MCNC: 11.2 G/DL (ref 11.7–15.7)
LYMPHOCYTES # BLD AUTO: 1 10E9/L (ref 0.8–5.3)
LYMPHOCYTES NFR BLD AUTO: 18 %
MCH RBC QN AUTO: 27.3 PG (ref 26.5–33)
MCHC RBC AUTO-ENTMCNC: 31.1 G/DL (ref 31.5–36.5)
MCV RBC AUTO: 88 FL (ref 78–100)
MONOCYTES # BLD AUTO: 0.5 10E9/L (ref 0–1.3)
MONOCYTES NFR BLD AUTO: 10 %
NEUTROPHILS # BLD AUTO: 3.8 10E9/L (ref 1.6–8.3)
NEUTROPHILS NFR BLD AUTO: 71 %
PLATELET # BLD AUTO: 98 10E9/L (ref 150–450)
PLATELET # BLD EST: ABNORMAL 10*3/UL
RBC # BLD AUTO: 4.1 10E12/L (ref 3.8–5.2)
RBC MORPH BLD: NORMAL
URATE SERPL-MCNC: 5.1 MG/DL (ref 2.6–6)
WBC # BLD AUTO: 5.4 10E9/L (ref 4–11)

## 2020-01-03 PROCEDURE — 59426 ANTEPARTUM CARE ONLY: CPT | Performed by: OBSTETRICS & GYNECOLOGY

## 2020-01-03 PROCEDURE — 76819 FETAL BIOPHYS PROFIL W/O NST: CPT

## 2020-01-03 PROCEDURE — 84460 ALANINE AMINO (ALT) (SGPT): CPT | Performed by: OBSTETRICS & GYNECOLOGY

## 2020-01-03 PROCEDURE — 85025 COMPLETE CBC W/AUTO DIFF WBC: CPT | Performed by: OBSTETRICS & GYNECOLOGY

## 2020-01-03 PROCEDURE — 82565 ASSAY OF CREATININE: CPT | Performed by: OBSTETRICS & GYNECOLOGY

## 2020-01-03 PROCEDURE — 99207 ZZC PRENATAL VISIT: CPT | Performed by: OBSTETRICS & GYNECOLOGY

## 2020-01-03 PROCEDURE — 84450 TRANSFERASE (AST) (SGOT): CPT | Performed by: OBSTETRICS & GYNECOLOGY

## 2020-01-03 PROCEDURE — 84550 ASSAY OF BLOOD/URIC ACID: CPT | Performed by: OBSTETRICS & GYNECOLOGY

## 2020-01-03 PROCEDURE — 36415 COLL VENOUS BLD VENIPUNCTURE: CPT | Performed by: OBSTETRICS & GYNECOLOGY

## 2020-01-03 PROCEDURE — 84520 ASSAY OF UREA NITROGEN: CPT | Performed by: OBSTETRICS & GYNECOLOGY

## 2020-01-03 NOTE — PATIENT INSTRUCTIONS
If you have any questions regarding your visit, Please contact your care team.  [facility]: [phone]  [facility] CLINIC HOURS TELEPHONE NUMBER   Cephas Agbeh, M.D.          Malgorzata Linn         Monday-[place]    8:00a.m-4:45 p.m    Tuesday--[place]     8:00a.m-4:45 p.m.    Thursday-[place]    8:00a.m-4:45 p.m.    Friday-[place]    8:00a.m-4:45 p.m    [facility]   [address]   [place] [address] [phone]-Ask for [facility]   Fax [phone]   [phone]     [facility]   [address] [place] [address] [place] [address]   [phone]    [facility]  [address] [facility] [address]  [phone]  [phone]   Urgent Care locations:    [facility] Monday-Friday  5 pm - 9 pm  Saturday and Sunday   9 am - 5 pm   Monday-Friday   5 pm - 9 pm  Saturday and Sunday  9 am - 5 pm    [phone]   If you need a medication refill, please contact your pharmacy. Please allow 3 business days for your refill to be completed.  As always, Thank you for trusting us with your healthcare needs!

## 2020-01-03 NOTE — PROGRESS NOTES
36w5d  Tired.Still with back pains. No cramps, contractions.  or bleeding or LOF  No HA, visual changes, or epigastric pain. She was evaluated on L&D yesterday and AST was elevated. Repeat preeclampsia  labs were ordered for today and are pending. BPP today is 8/8 . To go to L&D for worsening pains or id develops preeclampsia symptoms.  RTC 1 wk/prn.    Results for orders placed or performed in visit on 01/03/20   Urea nitrogen     Status: None   Result Value Ref Range    Urea Nitrogen 8 7 - 30 mg/dL   AST     Status: None   Result Value Ref Range    AST 19 0 - 45 U/L   ALT     Status: None   Result Value Ref Range    ALT 25 0 - 50 U/L   Creatinine     Status: None   Result Value Ref Range    Creatinine 0.67 0.52 - 1.04 mg/dL    GFR Estimate >90 >60 mL/min/[1.73_m2]    GFR Estimate If Black >90 >60 mL/min/[1.73_m2]   Uric acid     Status: None   Result Value Ref Range    Uric Acid 5.1 2.6 - 6.0 mg/dL   CBC with platelets and differential     Status: Abnormal   Result Value Ref Range    WBC 5.4 4.0 - 11.0 10e9/L    RBC Count 4.10 3.8 - 5.2 10e12/L    Hemoglobin 11.2 (L) 11.7 - 15.7 g/dL    Hematocrit 36.0 35.0 - 47.0 %    MCV 88 78 - 100 fl    MCH 27.3 26.5 - 33.0 pg    MCHC 31.1 (L) 31.5 - 36.5 g/dL    RDW 15.4 (H) 10.0 - 15.0 %    Platelet Count 98 (L) 150 - 450 10e9/L    % Neutrophils 71.0 %    % Lymphocytes 18.0 %    % Monocytes 10.0 %    % Eosinophils 1.0 %    Absolute Neutrophil 3.8 1.6 - 8.3 10e9/L    Absolute Lymphocytes 1.0 0.8 - 5.3 10e9/L    Absolute Monocytes 0.5 0.0 - 1.3 10e9/L    Absolute Eosinophils 0.1 0.0 - 0.7 10e9/L    RBC Morphology Normal     Platelet Estimate       Automated count confirmed.  Platelet morphology is abnormal.    Diff Method Automated Method      I updated Dr. TUTTLE (on call MD) regarding low patelet and mildly elevated B/P today.  He will be calling patient with lab results and possibly have her follow up at [facility] or over the weekend  CEPHAS AGBEH, MD.

## 2020-01-03 NOTE — TELEPHONE ENCOUNTER
Patient inquiry/concerns (127): I called patient and reviewed findings as outlined by Dr. Agbeh today. Among other concerns is potentially evolving preeclampsia.   History and available/pertinent chart info rev'd.  Discussed condition, E&M considerations, prognosis, appropriate precautions and instructions and follow-up.   Reviewed clinic priority, specialty, Urgent Care, ED and Hospital evaluation indications as appropriate.  Plan: Advise skipping the 24h urine collection since patient finds this too awkward.   Recommend repeat evaluation at Labor and Delivery on  0800 and NPO then. Will be 37 weeks and if any abnormal findings at that time then will plan to proceed with the planned  section delivery and TL. May need to have platelets available also.   Patient understands and agrees with plan. All questions answered. Patient to call if additional questions, concerns or significant changes in status.    Jules Barclay MD

## 2020-01-09 ENCOUNTER — TELEPHONE (OUTPATIENT)
Dept: OBGYN | Facility: CLINIC | Age: 40
End: 2020-01-09

## 2020-01-09 ENCOUNTER — OFFICE VISIT (OUTPATIENT)
Dept: FAMILY MEDICINE | Facility: CLINIC | Age: 40
End: 2020-01-09
Payer: COMMERCIAL

## 2020-01-09 VITALS
RESPIRATION RATE: 16 BRPM | OXYGEN SATURATION: 97 % | SYSTOLIC BLOOD PRESSURE: 131 MMHG | TEMPERATURE: 99.9 F | DIASTOLIC BLOOD PRESSURE: 83 MMHG | HEART RATE: 96 BPM

## 2020-01-09 DIAGNOSIS — N64.0 NIPPLE FISSURE: ICD-10-CM

## 2020-01-09 DIAGNOSIS — E66.01 MORBID OBESITY (H): ICD-10-CM

## 2020-01-09 DIAGNOSIS — T81.41XA SUPERFICIAL INCISIONAL INFECTION OF SURGICAL SITE: Primary | ICD-10-CM

## 2020-01-09 PROCEDURE — 99214 OFFICE O/P EST MOD 30 MIN: CPT | Performed by: PHYSICIAN ASSISTANT

## 2020-01-09 RX ORDER — ACETAMINOPHEN 500 MG
500 TABLET ORAL
COMMUNITY
Start: 2020-01-08 | End: 2020-03-04

## 2020-01-09 RX ORDER — HYDROXYZINE PAMOATE 50 MG/1
CAPSULE ORAL
COMMUNITY
Start: 2020-01-03 | End: 2020-03-04

## 2020-01-09 RX ORDER — OXYCODONE HYDROCHLORIDE 5 MG/1
5-10 TABLET ORAL
COMMUNITY
Start: 2020-01-08 | End: 2020-03-04

## 2020-01-09 RX ORDER — CEPHALEXIN 500 MG/1
500 CAPSULE ORAL 3 TIMES DAILY
Qty: 21 CAPSULE | Refills: 0 | Status: SHIPPED | OUTPATIENT
Start: 2020-01-09 | End: 2020-02-11

## 2020-01-09 RX ORDER — LABETALOL 100 MG/1
100 TABLET, FILM COATED ORAL
COMMUNITY
Start: 2020-01-08 | End: 2020-02-21

## 2020-01-09 ASSESSMENT — PAIN SCALES - GENERAL: PAINLEVEL: EXTREME PAIN (8)

## 2020-01-09 NOTE — TELEPHONE ENCOUNTER
section was on  and patient was just discharged  but seen today as noted. Keflex is safe while nursing. Please notify.   Jules Barclay MD

## 2020-01-09 NOTE — PATIENT INSTRUCTIONS
Keflex 500 mg three times a day for 7 days  Place a clean cotton cloth to the lower part of the abdomen to prevent sweating and incision contamination    Apply Lanolin to nipples 5 times a day especially before and after breastfeeding

## 2020-01-09 NOTE — PROGRESS NOTES
Subjective     Tiffany Sanz is a 39 year old female who presents to clinic today for the following health issues:    HPI   Concern - possible thrush   Onset: last night     Description:   Patient states that she thinks that she might have thrush due to burning sensation on her nipple. Patient was just discharged last night and had her . Patient thinks that her incision site might be infected due to some oozing that she has noticed. Patient states that her son ran into her and hit her incision site causing some pain. Patient would like to have her site looked at to make sure there is nothing wrong     Intensity: 8/10    Progression of Symptoms:  worsening    Accompanying Signs & Symptoms:  Oozing and burning     Previous history of similar problem:   None     Precipitating factors:   Worsened by: bed is too high    Alleviating factors:  Improved by: none     Therapies Tried and outcome: trying to relax       Patient Active Problem List   Diagnosis     CARDIOVASCULAR SCREENING; LDL GOAL LESS THAN 160     Morbid obesity (H)     Iron deficiency anemia due to chronic blood loss     Subserous leiomyoma of uterus     Hyperlipidemia     Antepartum multigravida of advanced maternal age     Supervision of other high risk pregnancy, antepartum     Previous  delivery, antepartum condition or complication     Genital herpes     Bacteriuria during pregnancy in first trimester     Gestational diabetes mellitus (GDM) in third trimester     Bacteriuria during pregnancy in second trimester     Transverse or oblique fetal presentation, antepartum     Past Surgical History:   Procedure Laterality Date      SECTION        SECTION, TUBAL LIGATION, COMBINED       HC INSERT IMPLANTABLE CONTRACEPTIVE CAPSULE      Nexplanon     HC INSERTION INTRAUTERINE DEVICE  2017    ParaGard     HC REMOVAL IMPLATABLE CONTRACEPTIVE CAPSULE       HC REMOVE INTRAUTERINE DEVICE       INCISION/DRAIN  ABSCESS EXTRA  2019    skin of right breast       Social History     Tobacco Use     Smoking status: Never Smoker     Smokeless tobacco: Never Used   Substance Use Topics     Alcohol use: Not Currently     Family History   Family history unknown: Yes         Current Outpatient Medications   Medication Sig Dispense Refill     acetaminophen (TYLENOL) 500 MG tablet Take 500 mg by mouth       aspirin (ASA) 81 MG chewable tablet Take 81 mg by mouth daily       blood glucose (ACCU-CHEK GUIDE) test strip Use to test blood sugar 4 times daily 200 each 3     cephALEXin (KEFLEX) 500 MG capsule Take 1 capsule (500 mg) by mouth 3 times daily for 7 days 21 capsule 0     hydrOXYzine (VISTARIL) 50 MG capsule        labetalol (NORMODYNE) 100 MG tablet Take 100 mg by mouth       oxyCODONE (ROXICODONE) 5 MG tablet Take 5-10 mg by mouth       Prenatal Vit-Fe Fumarate-FA (PRENATAL MULTIVITAMIN W/IRON) 27-0.8 MG tablet Take 1 tablet by mouth daily 90 tablet 3     acetone urine (KETOSTIX) test strip Use 1 strip/day with first morning urine until ketones are negative for 7 days, then use 1 strip/week (Patient not taking: Reported on 9/9/2019) 100 each 1     blood glucose monitoring (ACCU-CHEK FASTCLIX) lancets Use to test blood sugar 4 times daily (Patient not taking: Reported on 9/9/2019) 102 each 1     Allergies   Allergen Reactions     Sulfa Drugs Itching     Reviewed and updated as needed this visit by Provider  Tobacco  Allergies  Meds  Problems  Med Hx  Surg Hx  Fam Hx         Review of Systems   ROS COMP: Constitutional, HEENT, cardiovascular, pulmonary, gi and gu systems are negative, except as otherwise noted.      Objective    /83 (BP Location: Right arm, Patient Position: Chair, Cuff Size: Adult Large)   Pulse 96   Temp 99.9  F (37.7  C) (Oral)   Resp 16   LMP 03/31/2019 (Approximate)   SpO2 97%   There is no height or weight on file to calculate BMI.     Physical Exam   GENERAL: healthy, alert and no  "distress  NECK: no adenopathy, no asymmetry, masses, or scars and thyroid normal to palpation  RESP: lungs clear to auscultation - no rales, rhonchi or wheezes  BREAST: breasts are soft, no erythema, no hard areas, and  superficial nipple cracking and dryness bilaterally   CV: regular rate and rhythm, normal S1 S2, no S3 or S4, no murmur, click or rub, no peripheral edema and peripheral pulses strong  ABDOMEN: soft, nontender, without hepatosplenomegaly or masses, bowel sounds normal and vertical C section incision-superior part is healing well, in inferior aspect there is swelling, erythema and tenderness around the incision  MS: no gross musculoskeletal defects noted, no edema    Diagnostic Test Results:  Labs reviewed in Epic        Assessment & Plan       ICD-10-CM    1. Superficial incisional infection of surgical site T81.41XA cephALEXin (KEFLEX) 500 MG capsule   2. Nipple fissure N64.0    3. Morbid obesity (H) E66.01    Keflex 500 mg three times a day for 7 days  Place a clean cotton cloth to the lower part of the abdomen to prevent sweating and incision contamination    Apply Lanolin to nipples 5 times a day especially before and after breastfeeding     BMI:   Estimated body mass index is 52.17 kg/m  as calculated from the following:    Height as of 9/16/19: 1.68 m (5' 6.14\").    Weight as of 1/3/20: 147.2 kg (324 lb 9.6 oz).   Weight management plan: Discussed healthy diet and exercise guidelines        Return in about 5 days (around 1/14/2020), or if symptoms worsen or fail to improve.    Jazmyne Tavares PA-C  [facility]        "

## 2020-01-09 NOTE — TELEPHONE ENCOUNTER
RN called and relayed provider information in note below.    Patient verbalized understanding and agreed to plan.  Patient has no further questions at this time.    Lima Bradshaw RN on 1/9/2020 at 4:46 PM

## 2020-01-09 NOTE — TELEPHONE ENCOUNTER
Reason for Call: medication     Detailed comments: Patient is asking for a return call, as she was given rx cephALEXin (KEFLEX) 500 MG capsule she was told that her  scar may be infected, however she is concerned, because she is nursing the baby, and she is already on pain meds. Please call patient and advise ASAp    Phone Number Patient can be reached at: Cell number on file:    Telephone Information:   Mobile [phone]       Best Time: any    Can we leave a detailed message on this number? YES    Call taken on 2020 at 3:31 PM by Dimitry Potts

## 2020-01-13 ENCOUNTER — ALLIED HEALTH/NURSE VISIT (OUTPATIENT)
Dept: NURSING | Facility: CLINIC | Age: 40
End: 2020-01-13
Payer: COMMERCIAL

## 2020-01-13 ENCOUNTER — TELEPHONE (OUTPATIENT)
Dept: OBGYN | Facility: CLINIC | Age: 40
End: 2020-01-13

## 2020-01-13 VITALS — SYSTOLIC BLOOD PRESSURE: 141 MMHG | OXYGEN SATURATION: 98 % | HEART RATE: 76 BPM | DIASTOLIC BLOOD PRESSURE: 88 MMHG

## 2020-01-13 DIAGNOSIS — Z01.30 BLOOD PRESSURE CHECK: Primary | ICD-10-CM

## 2020-01-13 NOTE — PROGRESS NOTES
Ancillary BP check    HTN Guidelines:  Age 18-59 BP range:  Less than 140/90  Age 60-85 with Diabetes:  Less than 140/90  Age 60-85 without Diabetes:  less than 150/90        Tiffany Sanz is a 39 year old female who comes in today for a Blood Pressure check.    Patient is not taking medication as prescribed  Patient is not tolerating medications well.  Patient is not monitoring Blood Pressure at home.  Average readings if yes are     BP goal: < 140/90mmHg (patient 18+ years of age with or without diabetes)    BP reading today: FAILED     BP Readings from Last 1 Encounters:   01/13/20 (!) 141/88     A large cuff was used.  Pulse: 76    Vitals reviewed     Each reading recorded in vital signs section of chart: yes    Symptoms: headaches, fatigue, nausea or hard time seeing     Need for urgent appointment, based on criteria in ancillary BP workflow (elevated blood pressure and any of the following: dizziness, blurry vision, lightheadedness, severe shortness of breath, chest pain or visual disturbance): No       Plan: Not at goal without red flags, message routed to provider for further directions and follow up. Discussed will Emma vazquez to send home and send message MD.       Completed by: Erin Macdonald MA on 1/13/2020 at 6:01 PM  [facility]

## 2020-01-13 NOTE — TELEPHONE ENCOUNTER
Not certain if this is from today or  1/10 when patient described the same popping while lifting up her son and had 1/10 [facility] ED evaluation for this that was neg. UC from then was also neg. Continues on Keflex.    If recurrent abd problem and concerns on incision then advise repeat ED evaluation now and may need CT scan.     Please notify patient.   Jules Barclay MD

## 2020-01-13 NOTE — TELEPHONE ENCOUNTER
"RN called and spoke to patient. She was still feeling like her internal incision have \"popped\" from bending over to assist her child when he got into the cleaning supplies at home. She denies picking him up at all. She states that the ED did not do anything of significance at her recent visit they didn't even touch her just looked at her outside incision. She is disappointed with that interaction.     RN relayed message regarding her UC but patient states that the burning with urination has resolved since starting the Keflex.     Patient's addition concern is her blood pressure and how her current Labetalol 100 mg prescription is making her feel worse and is \"too high of a dosage\". She states, \" My BP medications have been making me weak and messing with my head and eyes.\" While at clinic today for her son's appointment she asked staff to take her B/P. Her systolic was 140-150s, she hasn't taken her Labetalol medication in two days. She is currently experiencing pressure in eyes and headaches, she has been taking Ibuprofen and Tylenol and it's only mildly effective against headache.     RN relayed Dr. Barclay message below and advised that patient needs to return to the ED for evaluation of her incision and blood pressures and related symptoms. RN will route to provider so he is aware.     Patient verbalized understanding and agreed to plan.     Lola Francis RN on 1/13/2020 at 12:55 PM        "

## 2020-01-13 NOTE — TELEPHONE ENCOUNTER
Noted and agree with plan.     Was taking Keflex for possible superficial wound infection and also was continued for possible UTI. May complete the course.     Had repeat classical  section with TL and midline incision closed en bloc with looped PDS. Dehiscence seems less likely but cannot exclude this without re-evaluation.     Labetalol started postop for severe preeclampsia and continued at discharge at low dose. Current symptoms non-specific. Postop Hgb= 9.9.     Jules Barclay MD

## 2020-01-13 NOTE — TELEPHONE ENCOUNTER
"Patient was here for her son's appt and feels her internal incisions have \"popped\".  Also is having trouble taking her BP medication.  Dr. Barclay not at [place] today, will forward message to Dr. Barclay.    Electronically signed by:  Mey Ruiz MD    "

## 2020-01-16 ENCOUNTER — TELEPHONE (OUTPATIENT)
Dept: FAMILY MEDICINE | Facility: CLINIC | Age: 40
End: 2020-01-16

## 2020-01-16 NOTE — TELEPHONE ENCOUNTER
Jazmyne Tavares PA-C  to Erin Macdonald MA;  1/14/2020  7:54 PM   Attached Progress Notes    Patient needs to follow up     Becka BRUNO

## 2020-01-16 NOTE — TELEPHONE ENCOUNTER
Called pt and left VM for pt to call back to schedule a recheck / Follow-up med check with a Provider

## 2020-02-08 ENCOUNTER — HEALTH MAINTENANCE LETTER (OUTPATIENT)
Age: 40
End: 2020-02-08

## 2020-02-11 ENCOUNTER — OFFICE VISIT (OUTPATIENT)
Dept: FAMILY MEDICINE | Facility: CLINIC | Age: 40
End: 2020-02-11
Payer: COMMERCIAL

## 2020-02-11 VITALS
HEART RATE: 76 BPM | DIASTOLIC BLOOD PRESSURE: 84 MMHG | RESPIRATION RATE: 16 BRPM | BODY MASS INDEX: 46.77 KG/M2 | TEMPERATURE: 98 F | OXYGEN SATURATION: 93 % | WEIGHT: 291 LBS | SYSTOLIC BLOOD PRESSURE: 138 MMHG

## 2020-02-11 DIAGNOSIS — L65.9 ALOPECIA: Primary | ICD-10-CM

## 2020-02-11 DIAGNOSIS — E66.01 MORBID OBESITY (H): ICD-10-CM

## 2020-02-11 PROCEDURE — 99213 OFFICE O/P EST LOW 20 MIN: CPT | Performed by: PREVENTIVE MEDICINE

## 2020-02-11 RX ORDER — FLUOCINOLONE ACETONIDE 0.11 MG/ML
OIL TOPICAL AT BEDTIME
Qty: 1 BOTTLE | Refills: 0 | Status: SHIPPED | OUTPATIENT
Start: 2020-02-11 | End: 2020-03-04

## 2020-02-11 ASSESSMENT — PAIN SCALES - GENERAL: PAINLEVEL: NO PAIN (0)

## 2020-02-11 NOTE — PROGRESS NOTES
Subjective     Tiffany Sanz is a 39 year old female who presents to clinic today for the following health issues:    HPI     Concern - rash  Onset:     Description:   Scalp           possible scabies?  Maybe ringworm?    Intensity: severe    Progression of Symptoms:  worsening    Accompanying Signs & Symptoms:  Hair is gone  Pimples, dry    Previous history of similar problem:   Yes    Precipitating factors:   Worsened by: NA    Alleviating factors:  Improved by: baking soda/water - improved the itching    Therapies Tried and outcome: NA    Pimple looking things on the scalp, has been almost 2 years   Pimple in the mouth for over a year  Recently post partum  Had a C section recently   Has spots on her body, her baby has it too. Even though pediatrician said it was normal spots she is concerned   Prefers to see a Specialist, does not want to wait a few weeks for the appointment     Patient Active Problem List   Diagnosis     CARDIOVASCULAR SCREENING; LDL GOAL LESS THAN 160     Morbid obesity (H)     Iron deficiency anemia due to chronic blood loss     Subserous leiomyoma of uterus     Hyperlipidemia     Antepartum multigravida of advanced maternal age     Supervision of other high risk pregnancy, antepartum     Previous  delivery, antepartum condition or complication     Genital herpes     Bacteriuria during pregnancy in first trimester     Gestational diabetes mellitus (GDM) in third trimester     Bacteriuria during pregnancy in second trimester     Transverse or oblique fetal presentation, antepartum     Past Surgical History:   Procedure Laterality Date      SECTION  2017      SECTION, TUBAL LIGATION, COMBINED       HC INSERT IMPLANTABLE CONTRACEPTIVE CAPSULE      Nexplanon     HC INSERTION INTRAUTERINE DEVICE  2017    ParaGard     HC REMOVAL IMPLATABLE CONTRACEPTIVE CAPSULE       HC REMOVE INTRAUTERINE DEVICE  2017     INCISION/DRAIN ABSCESS EXTRA  2019    skin of right  breast       Social History     Tobacco Use     Smoking status: Never Smoker     Smokeless tobacco: Never Used   Substance Use Topics     Alcohol use: Not Currently     Family History   Family history unknown: Yes         Current Outpatient Medications   Medication Sig Dispense Refill     acetaminophen (TYLENOL) 500 MG tablet Take 500 mg by mouth       acetone urine (KETOSTIX) test strip Use 1 strip/day with first morning urine until ketones are negative for 7 days, then use 1 strip/week 100 each 1     aspirin (ASA) 81 MG chewable tablet Take 81 mg by mouth daily       blood glucose (ACCU-CHEK GUIDE) test strip Use to test blood sugar 4 times daily 200 each 3     blood glucose monitoring (ACCU-CHEK FASTCLIX) lancets Use to test blood sugar 4 times daily 102 each 1     Blood Glucose Monitoring Suppl (ACCU-CHEK GUIDE) w/Device KIT 1 each once for 1 dose 1 kit 0     Fluocinolone Acetonide Scalp 0.01 % OIL oil Apply topically At Bedtime 1 Bottle 0     hydrOXYzine (VISTARIL) 50 MG capsule        labetalol (NORMODYNE) 100 MG tablet Take 100 mg by mouth       oxyCODONE (ROXICODONE) 5 MG tablet Take 5-10 mg by mouth       Prenatal Vit-Fe Fumarate-FA (PRENATAL MULTIVITAMIN W/IRON) 27-0.8 MG tablet Take 1 tablet by mouth daily 90 tablet 3     Allergies   Allergen Reactions     Sulfa Drugs Itching     BP Readings from Last 3 Encounters:   02/11/20 138/84   01/13/20 (!) 141/88   01/09/20 131/83    Wt Readings from Last 3 Encounters:   02/11/20 132 kg (291 lb)   01/03/20 147.2 kg (324 lb 9.6 oz)   12/24/19 145.2 kg (320 lb)              Reviewed and updated as needed this visit by Provider  Tobacco  Allergies  Meds  Problems  Med Hx  Surg Hx  Fam Hx         Review of Systems   ROS COMP: Constitutional, HEENT, cardiovascular, pulmonary, gi and gu systems are negative, except as otherwise noted.      Objective    /84 (BP Location: Left arm, Patient Position: Sitting, Cuff Size: Adult Large)   Pulse 76   Temp 98  F  (36.7  C) (Oral)   Resp 16   Wt 132 kg (291 lb)   LMP 03/31/2019 (Approximate)   SpO2 93%   BMI 46.77 kg/m    Body mass index is 46.77 kg/m .  Physical Exam   GENERAL APPEARANCE: healthy, alert and no distress  EYES: Eyes grossly normal to inspection and conjunctivae and sclerae normal  RESP: lungs clear to auscultation - no rales, rhonchi or wheezes  CV: regular rates and rhythm, normal S1 S2  ABDOMEN: soft, non-tender and no rebound or guarding   SKIN: no suspicious lesions or rashes  NEURO: Normal strength and tone, mentation intact and speech normal  PSYCH: mentation appears normal  Scalp: patches of hair loss scattered over the scalp. No drainage, no abscess, mild scaling.       Diagnostic Test Results:  Labs reviewed in Epic  No results found for this or any previous visit (from the past 24 hour(s)).        Assessment & Plan     Tiffany was seen today for derm problem.    Diagnoses and all orders for this visit:    Alopecia  -     DERMATOLOGY REFERRAL  -     Fluocinolone Acetonide Scalp 0.01 % OIL oil; Apply topically At Bedtime  -follow up with Dermatology    Morbid obesity (H)  -has had weight loss since last visit          Return in about 4 weeks (around 3/10/2020) for Routine Visit with Dermatology .    Estefania Berrios MD MPH    [facility]

## 2020-02-12 ENCOUNTER — TELEPHONE (OUTPATIENT)
Dept: FAMILY MEDICINE | Facility: CLINIC | Age: 40
End: 2020-02-12

## 2020-02-12 DIAGNOSIS — Z20.7 EXPOSURE TO SCABIES: Primary | ICD-10-CM

## 2020-02-12 RX ORDER — PERMETHRIN 50 MG/G
CREAM TOPICAL
Qty: 60 G | Refills: 1 | Status: SHIPPED | OUTPATIENT
Start: 2020-02-12 | End: 2020-03-04

## 2020-02-12 NOTE — TELEPHONE ENCOUNTER
Reason for call:  Symptom   Symptom or request: rash- poss scabies as children were diagnosed    Duration (how long have symptoms been present):   Have you been treated for this before? Yes    Additional comments: patient is asking for a pill for the rash and skin cream    Use brandy on [address]    Phone number to reach patient:  Home number on file [phone] (home)    Best Time:  any    Can we leave a detailed message on this number?  YES

## 2020-02-13 NOTE — TELEPHONE ENCOUNTER
Called and left detailed message with PT informing her that script was sent to her pharmacy and to give us a call back with any further questions.    Hector Kaiser CMA on 2/13/2020 at 8:22 AM

## 2020-02-21 ENCOUNTER — TELEPHONE (OUTPATIENT)
Dept: OBGYN | Facility: CLINIC | Age: 40
End: 2020-02-21

## 2020-02-21 ENCOUNTER — OFFICE VISIT (OUTPATIENT)
Dept: FAMILY MEDICINE | Facility: CLINIC | Age: 40
End: 2020-02-21
Payer: COMMERCIAL

## 2020-02-21 VITALS
RESPIRATION RATE: 20 BRPM | OXYGEN SATURATION: 98 % | DIASTOLIC BLOOD PRESSURE: 98 MMHG | HEART RATE: 73 BPM | WEIGHT: 292.8 LBS | TEMPERATURE: 98.1 F | SYSTOLIC BLOOD PRESSURE: 168 MMHG | BODY MASS INDEX: 47.06 KG/M2

## 2020-02-21 DIAGNOSIS — E55.9 VITAMIN D DEFICIENCY: ICD-10-CM

## 2020-02-21 DIAGNOSIS — Z86.32 HISTORY OF GESTATIONAL DIABETES: ICD-10-CM

## 2020-02-21 DIAGNOSIS — L91.0 HYPERTROPHIC SCAR: ICD-10-CM

## 2020-02-21 DIAGNOSIS — D50.0 IRON DEFICIENCY ANEMIA DUE TO CHRONIC BLOOD LOSS: Primary | ICD-10-CM

## 2020-02-21 DIAGNOSIS — I10 ESSENTIAL HYPERTENSION: ICD-10-CM

## 2020-02-21 LAB
BASOPHILS # BLD AUTO: 0 10E9/L (ref 0–0.2)
BASOPHILS NFR BLD AUTO: 0.3 %
DIFFERENTIAL METHOD BLD: ABNORMAL
EOSINOPHIL # BLD AUTO: 0.2 10E9/L (ref 0–0.7)
EOSINOPHIL NFR BLD AUTO: 2.7 %
ERYTHROCYTE [DISTWIDTH] IN BLOOD BY AUTOMATED COUNT: 16.6 % (ref 10–15)
FERRITIN SERPL-MCNC: 32 NG/ML (ref 12–150)
HBA1C MFR BLD: 5.3 % (ref 0–5.6)
HCT VFR BLD AUTO: 37.4 % (ref 35–47)
HGB BLD-MCNC: 11.4 G/DL (ref 11.7–15.7)
LYMPHOCYTES # BLD AUTO: 1.5 10E9/L (ref 0.8–5.3)
LYMPHOCYTES NFR BLD AUTO: 18.9 %
MCH RBC QN AUTO: 26 PG (ref 26.5–33)
MCHC RBC AUTO-ENTMCNC: 30.5 G/DL (ref 31.5–36.5)
MCV RBC AUTO: 85 FL (ref 78–100)
MONOCYTES # BLD AUTO: 0.6 10E9/L (ref 0–1.3)
MONOCYTES NFR BLD AUTO: 8.3 %
NEUTROPHILS # BLD AUTO: 5.4 10E9/L (ref 1.6–8.3)
NEUTROPHILS NFR BLD AUTO: 69.8 %
PLATELET # BLD AUTO: 193 10E9/L (ref 150–450)
RBC # BLD AUTO: 4.38 10E12/L (ref 3.8–5.2)
WBC # BLD AUTO: 7.7 10E9/L (ref 4–11)

## 2020-02-21 PROCEDURE — 82728 ASSAY OF FERRITIN: CPT | Performed by: PREVENTIVE MEDICINE

## 2020-02-21 PROCEDURE — 99214 OFFICE O/P EST MOD 30 MIN: CPT | Performed by: PREVENTIVE MEDICINE

## 2020-02-21 PROCEDURE — 83036 HEMOGLOBIN GLYCOSYLATED A1C: CPT | Performed by: PREVENTIVE MEDICINE

## 2020-02-21 PROCEDURE — 85025 COMPLETE CBC W/AUTO DIFF WBC: CPT | Performed by: PREVENTIVE MEDICINE

## 2020-02-21 PROCEDURE — 36415 COLL VENOUS BLD VENIPUNCTURE: CPT | Performed by: PREVENTIVE MEDICINE

## 2020-02-21 PROCEDURE — 82306 VITAMIN D 25 HYDROXY: CPT | Performed by: PREVENTIVE MEDICINE

## 2020-02-21 RX ORDER — PRENATAL VIT/IRON FUM/FOLIC AC 27MG-0.8MG
1 TABLET ORAL DAILY
Qty: 90 TABLET | Refills: 3 | Status: SHIPPED | OUTPATIENT
Start: 2020-02-21 | End: 2020-03-04

## 2020-02-21 RX ORDER — LABETALOL 100 MG/1
50 TABLET, FILM COATED ORAL DAILY
COMMUNITY
Start: 2020-02-21 | End: 2020-03-20

## 2020-02-21 ASSESSMENT — PAIN SCALES - GENERAL: PAINLEVEL: NO PAIN (0)

## 2020-02-21 NOTE — RESULT ENCOUNTER NOTE
Tiffany,     Three month glucose number is normal, you do not have diabetes or pre diabetes.  Hemoglobin is almost normal, improved from last check.  Other labs are pending.     Please do not hesitate to call us at [phone] if you have any questions or concerns.    Thank you,    Estefania Berrios MD MPH

## 2020-02-21 NOTE — TELEPHONE ENCOUNTER
[facility] Call Center    Phone Message    May a detailed message be left on voicemail: yes     Reason for Call: Other: Celina from [employer] called stating the phsyician statement that Dr. Barclay completed for pt's sterilization needs to be signed for the date of service (1/15/2020). Please advise.     Action Taken: Message routed to:  [facility] p [phone]    Travel Screening: Not Applicable

## 2020-02-21 NOTE — PROGRESS NOTES
Subjective     Tiffany Sanz is a 39 year old female who presents to clinic today for the following health issues:    HPI     Concern - Low Iron  Onset: ongoing for months    Description: Iron continues to drop per pt, would like full work up    Hemoglobin was 11 (2020).   Has been       Concern - breast problem  Onset: over a year ago    Description: Pt states there was a fluid filled sac in her right breast over a year ago. Fluid was drained, and ever since then she has sensitivity to the breast and a slight lump there    Has been able to nurse without problems    Nipples are more sensitive but no bleeding or purulent drainage     Hypertension Follow-up      Do you check your blood pressure regularly outside of the clinic? No     Are you following a low salt diet? Yes    Are your blood pressures ever more than 140 on the top number (systolic) OR more   than 90 on the bottom number (diastolic), for example 140/90? No    Stopped taking her medication Labetalol a few weeks ago, felt it was too much, felt dizzy when she took a dose.     Patient Active Problem List   Diagnosis     CARDIOVASCULAR SCREENING; LDL GOAL LESS THAN 160     Morbid obesity (H)     Iron deficiency anemia due to chronic blood loss     Subserous leiomyoma of uterus     Hyperlipidemia     Antepartum multigravida of advanced maternal age     Supervision of other high risk pregnancy, antepartum     Previous  delivery, antepartum condition or complication     Genital herpes     Bacteriuria during pregnancy in first trimester     Gestational diabetes mellitus (GDM) in third trimester     Bacteriuria during pregnancy in second trimester     Transverse or oblique fetal presentation, antepartum     Past Surgical History:   Procedure Laterality Date      SECTION        SECTION, TUBAL LIGATION, COMBINED       HC INSERT IMPLANTABLE CONTRACEPTIVE CAPSULE      Nexplanon     HC INSERTION INTRAUTERINE DEVICE       ParaGard     HC REMOVAL IMPLATABLE CONTRACEPTIVE CAPSULE  2011     HC REMOVE INTRAUTERINE DEVICE  2017     INCISION/DRAIN ABSCESS EXTRA  2019    skin of right breast       Social History     Tobacco Use     Smoking status: Never Smoker     Smokeless tobacco: Never Used   Substance Use Topics     Alcohol use: Not Currently     Family History   Family history unknown: Yes         Current Outpatient Medications   Medication Sig Dispense Refill     acetaminophen (TYLENOL) 500 MG tablet Take 500 mg by mouth       acetone urine (KETOSTIX) test strip Use 1 strip/day with first morning urine until ketones are negative for 7 days, then use 1 strip/week 100 each 1     aspirin (ASA) 81 MG chewable tablet Take 81 mg by mouth daily       blood glucose (ACCU-CHEK GUIDE) test strip Use to test blood sugar 4 times daily 200 each 3     blood glucose monitoring (ACCU-CHEK FASTCLIX) lancets Use to test blood sugar 4 times daily 102 each 1     Fluocinolone Acetonide Scalp 0.01 % OIL oil Apply topically At Bedtime 1 Bottle 0     hydrOXYzine (VISTARIL) 50 MG capsule        labetalol 100 MG PO tablet Take 0.5 tablets (50 mg) by mouth daily       oxyCODONE (ROXICODONE) 5 MG tablet Take 5-10 mg by mouth       permethrin (ELIMITE) 5 % external cream Apply cream from head to toe (except the face); leave on for 8-14 hours then wash off with water; reapply in 1 week if live mites appear. 60 g 1     Prenatal Vit-Fe Fumarate-FA (PRENATAL MULTIVITAMIN W/IRON) 27-0.8 MG PO tablet Take 1 tablet by mouth daily 90 tablet 3     Allergies   Allergen Reactions     Sulfa Drugs Itching     BP Readings from Last 3 Encounters:   02/21/20 (!) 168/98   02/11/20 138/84   01/13/20 (!) 141/88    Wt Readings from Last 3 Encounters:   02/21/20 132.8 kg (292 lb 12.8 oz)   02/11/20 132 kg (291 lb)   01/03/20 147.2 kg (324 lb 9.6 oz)            Reviewed and updated as needed this visit by Provider  Tobacco  Allergies  Meds  Problems  Med Hx  Surg Hx  Fam Hx          Review of Systems   ROS COMP: Constitutional, HEENT, cardiovascular, pulmonary, gi and gu systems are negative, except as otherwise noted.      Objective    BP (!) 168/98   Pulse 73   Temp 98.1  F (36.7  C) (Oral)   Resp 20   Wt 132.8 kg (292 lb 12.8 oz)   LMP 03/31/2019 (Approximate)   SpO2 98%   BMI 47.06 kg/m    Body mass index is 47.06 kg/m .  Physical Exam   GENERAL: healthy, alert and no distress  EYES: Eyes grossly normal to inspection, PERRL and conjunctivae and sclerae normal  NECK: no adenopathy, no asymmetry  RESP: lungs clear to auscultation - no rales, rhonchi or wheezes  BREAST: normal without masses, tenderness or nipple discharge and no palpable axillary masses or adenopathy, right areola at 4 o'clock position is an area of hypertrophy, no erythema, no rash.   CV: regular rate and rhythm, normal S1 S2  ABDOMEN: soft, nontende  MS: no gross musculoskeletal defects noted, no edema  SKIN: no suspicious lesions or rashes  NEURO: Normal strength and tone, mentation intact and speech normal  PSYCH: mentation appears normal, affect normal/bright    Diagnostic Test Results:  Labs reviewed in Epic  Results for orders placed or performed in visit on 02/21/20 (from the past 24 hour(s))   CBC with platelets differential   Result Value Ref Range    WBC 7.7 4.0 - 11.0 10e9/L    RBC Count 4.38 3.8 - 5.2 10e12/L    Hemoglobin 11.4 (L) 11.7 - 15.7 g/dL    Hematocrit 37.4 35.0 - 47.0 %    MCV 85 78 - 100 fl    MCH 26.0 (L) 26.5 - 33.0 pg    MCHC 30.5 (L) 31.5 - 36.5 g/dL    RDW 16.6 (H) 10.0 - 15.0 %    Platelet Count 193 150 - 450 10e9/L    % Neutrophils 69.8 %    % Lymphocytes 18.9 %    % Monocytes 8.3 %    % Eosinophils 2.7 %    % Basophils 0.3 %    Absolute Neutrophil 5.4 1.6 - 8.3 10e9/L    Absolute Lymphocytes 1.5 0.8 - 5.3 10e9/L    Absolute Monocytes 0.6 0.0 - 1.3 10e9/L    Absolute Eosinophils 0.2 0.0 - 0.7 10e9/L    Absolute Basophils 0.0 0.0 - 0.2 10e9/L    Diff Method Automated Method     Hemoglobin A1c   Result Value Ref Range    Hemoglobin A1C 5.3 0 - 5.6 %           Assessment & Plan     Tiffany was seen today for recheck.    Diagnoses and all orders for this visit:    Iron deficiency anemia due to chronic blood loss  -     CBC with platelets differential  -     Ferritin  -blood counts are almost back to baseline  -Platelets are now normal    Vitamin D deficiency  -     Vitamin D Deficiency  -Replace if needed    Breast feeding status of mother  -     Prenatal Vit-Fe Fumarate-FA (PRENATAL MULTIVITAMIN W/IRON) 27-0.8 MG PO tablet; Take 1 tablet by mouth daily    History of gestational diabetes  -     Hemoglobin A1c  -Normal     Essential hypertension  -not controlled  -had stopped her medication  -advised to restart, is reluctant to take 100 mg dose, feels it makes her dizzy  -hence, start with 50 mg daily and follow up in Ancillary for a blood pressure check in 2 weeks, if not at goal then increase to 100 mg daily     Hypertrophic scar  -from prior incision and drainage  -expect improvement with time     Work on weight loss  Regular exercise    Return in about 2 weeks (around 3/6/2020) for BP Recheck.    Estefania Berrios MD MPH    [facility]

## 2020-02-23 LAB — DEPRECATED CALCIDIOL+CALCIFEROL SERPL-MC: 27 UG/L (ref 20–75)

## 2020-02-24 NOTE — RESULT ENCOUNTER NOTE
Tiffany,     Vitamin D and Ferritin levels are normal.     Please do not hesitate to call us at [phone] if you have any questions or concerns.    Thank you,    Estefania Berrios MD MPH

## 2020-02-25 NOTE — TELEPHONE ENCOUNTER
Returned call to Celina and left message.    Need to know where to send signed consent.    Janet Yang, JADYN

## 2020-02-26 NOTE — TELEPHONE ENCOUNTER
Celina called back and states the form can be uploaded to the patient Media tab in her chart. If unable to do that, forms can be sent to [facility] and will be uploaded there. Please call Celina with any further questions.

## 2020-02-27 NOTE — TELEPHONE ENCOUNTER
Copy sent to stat abstracting folder for scanning and also is Emailed to DEPT--[facility].    Janet Yang CMA

## 2020-03-04 ENCOUNTER — PRENATAL OFFICE VISIT (OUTPATIENT)
Dept: OBGYN | Facility: CLINIC | Age: 40
End: 2020-03-04
Payer: COMMERCIAL

## 2020-03-04 VITALS
SYSTOLIC BLOOD PRESSURE: 137 MMHG | BODY MASS INDEX: 46.77 KG/M2 | TEMPERATURE: 98.2 F | DIASTOLIC BLOOD PRESSURE: 88 MMHG | OXYGEN SATURATION: 98 % | WEIGHT: 291 LBS | HEART RATE: 88 BPM

## 2020-03-04 PROBLEM — O24.419 GESTATIONAL DIABETES MELLITUS (GDM) IN THIRD TRIMESTER: Status: RESOLVED | Noted: 2019-07-24 | Resolved: 2020-03-04

## 2020-03-04 PROBLEM — O34.219 PREVIOUS CESAREAN DELIVERY, ANTEPARTUM CONDITION OR COMPLICATION: Status: RESOLVED | Noted: 2019-07-02 | Resolved: 2020-03-04

## 2020-03-04 PROBLEM — R82.71 BACTERIURIA DURING PREGNANCY IN SECOND TRIMESTER: Status: RESOLVED | Noted: 2019-08-21 | Resolved: 2020-03-04

## 2020-03-04 PROBLEM — O99.891 BACTERIURIA DURING PREGNANCY IN SECOND TRIMESTER: Status: RESOLVED | Noted: 2019-08-21 | Resolved: 2020-03-04

## 2020-03-04 PROBLEM — O99.891 BACTERIURIA DURING PREGNANCY IN FIRST TRIMESTER: Status: RESOLVED | Noted: 2019-07-23 | Resolved: 2020-03-04

## 2020-03-04 PROBLEM — R82.71 BACTERIURIA DURING PREGNANCY IN FIRST TRIMESTER: Status: RESOLVED | Noted: 2019-07-23 | Resolved: 2020-03-04

## 2020-03-04 PROBLEM — D50.0 IRON DEFICIENCY ANEMIA DUE TO CHRONIC BLOOD LOSS: Status: RESOLVED | Noted: 2019-03-21 | Resolved: 2020-03-04

## 2020-03-04 PROBLEM — O32.2XX0 TRANSVERSE OR OBLIQUE FETAL PRESENTATION, ANTEPARTUM: Status: RESOLVED | Noted: 2019-12-18 | Resolved: 2020-03-04

## 2020-03-04 PROBLEM — O09.899 SUPERVISION OF OTHER HIGH RISK PREGNANCY, ANTEPARTUM: Status: RESOLVED | Noted: 2019-07-02 | Resolved: 2020-03-04

## 2020-03-04 PROCEDURE — 99207 ZZC POST PARTUM EXAM: CPT | Performed by: OBSTETRICS & GYNECOLOGY

## 2020-03-04 RX ORDER — CHLORHEXIDINE GLUCONATE ORAL RINSE 1.2 MG/ML
15 SOLUTION DENTAL
COMMUNITY
Start: 2020-02-27 | End: 2020-03-28

## 2020-03-04 ASSESSMENT — PATIENT HEALTH QUESTIONNAIRE - PHQ9
SUM OF ALL RESPONSES TO PHQ QUESTIONS 1-9: 0
5. POOR APPETITE OR OVEREATING: NOT AT ALL

## 2020-03-04 ASSESSMENT — ANXIETY QUESTIONNAIRES
2. NOT BEING ABLE TO STOP OR CONTROL WORRYING: NOT AT ALL
3. WORRYING TOO MUCH ABOUT DIFFERENT THINGS: NOT AT ALL
1. FEELING NERVOUS, ANXIOUS, OR ON EDGE: NOT AT ALL
6. BECOMING EASILY ANNOYED OR IRRITABLE: NOT AT ALL
5. BEING SO RESTLESS THAT IT IS HARD TO SIT STILL: NOT AT ALL
IF YOU CHECKED OFF ANY PROBLEMS ON THIS QUESTIONNAIRE, HOW DIFFICULT HAVE THESE PROBLEMS MADE IT FOR YOU TO DO YOUR WORK, TAKE CARE OF THINGS AT HOME, OR GET ALONG WITH OTHER PEOPLE: NOT DIFFICULT AT ALL
7. FEELING AFRAID AS IF SOMETHING AWFUL MIGHT HAPPEN: NOT AT ALL
GAD7 TOTAL SCORE: 0

## 2020-03-04 NOTE — PATIENT INSTRUCTIONS
If you have any questions regarding your visit, Please contact your care team.     [employer]: [phone]  [facility] CLINIC HOURS TELEPHONE NUMBER       Jules Barclay M.D.      Malgorzata Gonzales-Medical Assistant    Matilde-  Joanna-     Monday-[place]  8:00a.m-4:45 p.m  Tuesday-[place]  9:00a.m-4:00 p.m  Wednesday-[place] 8:00a.m-4:45 p.m.  Thursday-[place]  8:00a.m-4:45 p.m.  Friday-[place]  8:00a.m-4:45 p.m. [facility]  [address]  [place], [place] [address] [phone] ask [facility]  [phone] Fax  Imaging [phone]    [facility]  [address]  [place], [place] [address] [phone]    [facility]  [address] [place] [address] [phone] [facility]  [phone] Fax  Imaging [phone]     Urgent Care locations:    [facility] Monday-Friday  5 pm - 9 pm  Saturday and Sunday   9 am - 5 pm  Monday-Friday   11 am - 9 pm  Saturday and Sunday   9 am - 5 pm   [phone]   If you need a medication refill, please contact your pharmacy. Please allow 3 business days for your refill to be completed.  As always, Thank you for trusting us with your healthcare needs!

## 2020-03-05 ASSESSMENT — ANXIETY QUESTIONNAIRES: GAD7 TOTAL SCORE: 0

## 2020-03-05 NOTE — PROGRESS NOTES
Tiffany Sanz is a 39 year old year old G 4 P 3 who is here today for a postpartum exam.    HPI:      Doing well and without signif sx or concerns except MRSA boils in past and recent treatment after son had infection. Had normal HgbA1c and CBC. Currently feeding infant formula. Here today alone. Infant son currently in hospital with lacrimal duct MRSA infection and improving. Contraceptive method planned is TL- done. Mild dyspareunia since delivery. PP depression screening as noted. See PHQ-9. Score = 0.    Past medical, obstetrical, surgical, family and social history reviewed and as noted or updated in chart.     Allergies, meds and supplements are as noted or updated in chart.      ROS:     Systems reviewed include constitutional; breast;                 cardiac; respiratory; gastrointestinal; genitourinary;                                musculoskeletal; integumentary; psychological;                                hematologic/lymphatic and endocrine.                  These systems were negative for significant symptoms except                 for the following: none and see HPI.                                Exam:  VS as noted.                    Abd was                             normal or negative except for, or in particular noting, the following                pertinent findings: none. Wd A.       Assessment: Postpartum exam    Plan and Recommendations: Total encounter time (physician together with patient) = 20min. Direct counseling, education and care coordination time (physician together with patient) = 15min. This counseling included the following: Symptoms, problems and concerns reviewed. Discussed pregnancy, birth, future pregnancy plans, work plans and infant care issues.  Problem list updated and Pregnancy Episode closed. See orders. Return to clinic in 6 months for Pap/HRHPV.   Encouraged weight loss. Deferring 2h GTT now.     Tiffany was seen today for post partum exam.    Diagnoses and all orders  for this visit:    Routine postpartum follow-up        Jules Barclay MD

## 2020-03-19 ENCOUNTER — VIRTUAL VISIT (OUTPATIENT)
Dept: FAMILY MEDICINE | Facility: OTHER | Age: 40
End: 2020-03-19

## 2020-03-20 ENCOUNTER — VIRTUAL VISIT (OUTPATIENT)
Dept: FAMILY MEDICINE | Facility: CLINIC | Age: 40
End: 2020-03-20
Payer: COMMERCIAL

## 2020-03-20 DIAGNOSIS — I10 ESSENTIAL HYPERTENSION: Primary | ICD-10-CM

## 2020-03-20 DIAGNOSIS — R42 DIZZINESS: ICD-10-CM

## 2020-03-20 PROCEDURE — 99441 ZZC PHYSICIAN TELEPHONE EVALUATION 5-10 MIN: CPT | Performed by: PREVENTIVE MEDICINE

## 2020-03-20 RX ORDER — PRENATAL VIT/IRON FUM/FOLIC AC 27MG-0.8MG
1 TABLET ORAL DAILY
Qty: 90 TABLET | Refills: 1 | Status: SHIPPED | OUTPATIENT
Start: 2020-03-20 | End: 2020-10-10

## 2020-03-20 RX ORDER — BLOOD SUGAR DIAGNOSTIC
STRIP MISCELLANEOUS
COMMUNITY
Start: 2019-11-05 | End: 2021-04-20

## 2020-03-20 RX ORDER — LABETALOL 100 MG/1
100 TABLET, FILM COATED ORAL DAILY
Qty: 90 TABLET | Refills: 0 | Status: SHIPPED | OUTPATIENT
Start: 2020-03-20 | End: 2020-10-08

## 2020-03-20 RX ORDER — LANCETS
EACH MISCELLANEOUS
COMMUNITY
Start: 2019-11-05 | End: 2021-04-20

## 2020-03-20 RX ORDER — MUPIROCIN 20 MG/G
OINTMENT TOPICAL
COMMUNITY
Start: 2020-02-27 | End: 2021-07-22

## 2020-03-20 RX ORDER — PERMETHRIN 50 MG/G
CREAM TOPICAL
COMMUNITY
Start: 2020-02-12 | End: 2021-07-22

## 2020-03-20 RX ORDER — PRENATAL VIT/IRON FUM/FOLIC AC 27MG-0.8MG
TABLET ORAL
COMMUNITY
Start: 2020-02-21 | End: 2020-03-20

## 2020-03-20 RX ORDER — FLUOCINOLONE ACETONIDE 0.11 MG/ML
OIL TOPICAL
COMMUNITY
Start: 2020-02-12 | End: 2021-07-22

## 2020-03-20 RX ORDER — ACETAMINOPHEN 500 MG
500-1000 TABLET ORAL EVERY 6 HOURS PRN
Qty: 90 TABLET | Refills: 0 | Status: SHIPPED | OUTPATIENT
Start: 2020-03-20 | End: 2020-10-10

## 2020-03-20 NOTE — PATIENT INSTRUCTIONS
At [facility], we strive to deliver an exceptional experience to you, every time we see you. If you receive a survey, please complete it as we do value your feedback.  If you have MyChart, you can expect to receive results automatically within 24 hours of their completion.  Your provider will send a note interpreting your results as well.   If you do not have MyChart, you should receive your results in about a week by mail.    Your care team:                            Family Medicine Internal Medicine   MD Yvan Sloan MD Shantel Branch-Fleming, MD Katya Georgiev PA-C Megan Hill, APRANEL Cardona, MD Pediatrics   Davidson Bennett, PABART Garrett, MD Laura Lomeli APRN CNP   MD Mey James MD Deborah Mielke, MD Kim Thein, APRN [facility]      Clinic hours: Monday - Thursday 7 am-7 pm; Fridays 7 am-5 pm.   Urgent care: Monday - Friday 11 am-9 pm; Saturday and Sunday 9 am-5 pm.    Clinic: [phone]       [facility]: Monday - Thursday 8 am - 7 pm; Friday 8 am - 6 pm  [facility]: [phone]     Use [email or web address] for 24/7 diagnosis and treatment of dozens of conditions.

## 2020-03-20 NOTE — PROGRESS NOTES
"Date: 2020 13:25:23  Clinician: Thelma Chandra  Clinician NPI: [id number]  Patient: Tiffany Sanz  Patient : 1980  Patient Address: [address] [place] [address] [address], [place] [address]  Patient Phone: [phone]  Visit Protocol: URI  Patient Summary:  Tiffany is a 39 year old ( : 1980 ) female who initiated a Visit for cold, sinus infection, or influenza. When asked the question \"Please sign me up to receive news, health information and promotions from [employer].\", Tiffany responded \"Yes\".    Tiffany states her symptoms started gradually 7-9 days ago. After her symptoms started, they improved and then got worse again.   Her symptoms consist of a sore throat and myalgia.   Symptom details   Sore throat: Tiffany reports having mild throat pain (1-3 on a 10 point pain scale), has exudate on her tonsils, and can swallow liquids. She is not sure if the lymph nodes in her neck are enlarged. A rash has not appeared on the skin since the sore throat started.    Tiffany denies having cough, nasal congestion, malaise, headache, fever, ear pain, rhinitis, facial pain or pressure, chills, teeth pain, and wheezing. She also denies having recent facial or sinus surgery in the past 60 days. She is not experiencing dyspnea.   Precipitating events  Tiffany is not sure if she has been exposed to someone with strep throat. She has not recently been exposed to someone with influenza. Tiffany has been in close contact with the following high risk individuals: children under the age of 5.   Pertinent COVID-19 (Coronavirus) information  Tiffany has not traveled internationally or to the areas where COVID-19 (Coronavirus) is widespread, including cruise ship travel in the last 14 days before the start of her symptoms.   Tiffany has not had a close contact with a laboratory-confirmed COVID-19 patient within 14 days of symptom onset. She has had a close contact with a suspected COVID-19 patient within 14 days of symptom onset. Additional information about " contact with COVID-19 (Coronavirus) patient as reported by the patient (free text): I was in hospital with a sick child I would set in the family waiting area   Tiffany is not a healthcare worker and does not work in a healthcare facility.   Pertinent medical history  Tiffany has taken an antibiotic medication in the past month. Antibiotic details as reported by the patient (free text): Labetalol   Tiffany does not get yeast infections when she takes antibiotics.   Tiffany does not need a return to work/school note.   Weight: 291 lbs   Tiffany does not smoke or use smokeless tobacco.   She denies pregnancy and denies breastfeeding. She has menstruated in the past month.   Weight: 291 lbs    MEDICATIONS: No current medications, ALLERGIES: Sulfa (Sulfonamide Antibiotics)  Clinician Response:  Dear Tiffany,   Dear Tiffany,  Based on the information you have provided about potential exposure to Coronavirus (Covid-19) but without any symptoms of the virus (cough, fever, shortness of breath are the most common symptoms), it does not appear you need Coronavirus (COVID-19) testing at this time.   But your possible exposure to Coronavirus means that we do recommend self-isolation for 14 days from the last day you may have been exposed.   What does this mean?  Isolate Yourself:   Isolate yourself at home.   Do Not allow any visitors  Do Not go to work or school  Do Not go to [employer],  centers, shopping, or other public places.  Do Not shake hands.  Avoid close contact with others (hugging, kissing).   Protect Others:   Cover Your Mouth and Nose with a mask, disposable tissue or wash cloth to avoid spreading germs to others.  Wash your hands and face frequently with soap and water.   If you have not developed a cough with fever by day 15 of isolation you are considered uninfected.  Thank you for limiting contact with others, wearing a simple mask to cover your cough, practice good hand hygiene habits and accessing our virtual services  where possible to limit the spread of this virus.  For more information about COVID19 and options for caring for yourself at home, please visit the CDC website at https://www.cdc.gov/coronavirus/2019-ncov/about/steps-when-sick.html  For more options for care at [facility], please visit our website at [email or web address]    COVID-19 (Coronavirus) General Information  With the increase in the number of COVID-19 (Coronavirus) cases, we understand you may have some questions. Below is some helpful information on COVID-19 (Coronavirus).  How can I protect myself and others from the COVID-19 (Coronavirus)?  Because there is currently no vaccine to prevent infection, the best way to protect yourself is to avoid being exposed to this virus. Put distance between yourself and other people if COVID-19 (Coronavirus) is spreading in your community. The virus is thought to spread mainly from person-to-person.     Between people who are in close contact with one another (within about 6 about) for a prolonged period (10 minutes or longer).    Through respiratory droplets produced when an infected person coughs or sneezes.     The CDC recommends the following additional steps to protect yourself and others:     Wash your hands often with soap and water for at least 20 seconds, especially after blowing your nose, coughing, or sneezing; going to the bathroom; and before eating or preparing food.  Use an alcohol-based hand  that contains at least 60 percent alcohol if soap and water are not available.        Avoid touching your eyes, nose and mouth with unwashed hands.    Avoid close contact with people who are sick.    Stay home when you are sick.    Cover your cough or sneeze with a tissue, then throw the tissue in the trash.    Clean and disinfect frequently touched objects and surfaces.     You can help stop COVID-19 (Coronavirus) by knowing the signs and symptoms:     Fever    Cough     Shortness of breath     Contact your healthcare provider if   Develop symptoms   AND   Have been in close contact with a person known to have COVID-19 (Coronavirus) or live in or have recently traveled from an area with ongoing spread of COVID-19 (Coronavirus). Call ahead before you go to a doctor's office or emergency room. Tell them about your recent travel and your symptoms.   For the most up to date information, visit the CDC's website.  Self-monitoring  Self-monitoring means people should monitor themselves for fever by taking their temperatures twice a day and remain alert for a cough or difficulty breathing.  It is important to check your health two times each day for 14 days after a potential exposure to a person with COVID-19 (Coronavirus) or after travel from a location where COVID-19 (Coronavirus) is widespread. If you have been exposed to a person with COVID-19 (Coronavirus), it may take up to 14 days to know if you will get sick. Follow the steps below to check and record your health.     Take your temperature with a thermometer twice a day, once in the morning and once in the evening, and watch for a cough or difficulty breathing for 14 days.    Write down your temperature and any COVID-19 symptoms you may have: feeling feverish, coughing, or difficulty breathing.    Stay home from work or school.    Do not take public transportation, taxis, or ride-shares.    Avoid crowded places (such as shopping centers and movie theaters) and limit your activities in public.    Keep your distance from others (about 6 feet or 2 meters).    If you get sick with fever, cough, or trouble breathing, contact your healthcare provider and tell them about your recent travel and/or your symptoms.    If you need to seek medical care for other reasons, such as dialysis, call ahead to your doctor and tell them about your recent travel.     Steps to help prevent the spread of COVID-19 (Coronavirus) if you are sick  If you are  sick with COVID-19 (Coronavirus) or suspect you are infected with the virus that causes COVID-19 (Coronavirus), follow the steps below to help prevent the disease from spreading&nbsp;to people in your home and community.     Stay home except to get medical care. Home isolation may be started in consultation with your healthcare clinician.    Separate yourself from other people and animals in your home.    Call ahead before visiting your doctor if you have a medical appointment.    Wear a facemask when you are around other people.    Cover your cough and sneezes.    Clean your hands often.    Avoid sharing personal household items.    Clean and disinfect frequently touched objects and surfaces everyday.    You will need to have someone drop off medications or household supplies (if needed) at your house without coming inside or in contact with you or others living in your house.    Monitor your symptoms and seek prompt medical care if your illness is worsening (e.g. Difficulty breathing).    Discontinue home isolation only in consultation with your healthcare provider.     For more detailed and up to date information on what to do if you are sick, visit this link: What to Do If You Are Sick With Coronavirus Disease 2019 (COVID-19).  Do I need to be tested for COVID-19 (Coronavirus)?     At this time, the limited number of available tests are controlled by the state and local health departments and are being reserved for more seriously ill patients, those with known exposure to confirmed patients, and those with recent travel (within 14 days) to countries with high rates of COVID-19 (Coronavirus).    Decisions on which patients receive testing will be based on the local spread of COVID-19 (Coronavirus) as well as the symptoms. Your healthcare provider will make the final decision on whether you should be tested.    In the meantime, if you have concerns that you may have been exposed, it is reasonable to practice  "\"social distancing.\"&nbsp; If you are ill with a cold or flu-like illness, please monitor your symptoms and reach out to your healthcare provider if your symptoms worsen.    For more up to date information, visit this link: COVID-19 (Coronavirus) Frequently Asked Questions and Answers.      Diagnosis: Acute streptococcal tonsillitis, unspecified  Diagnosis ICD: J03.00  Prescription: amoxicillin 500 mg oral capsule 20 capsule, 10 days supply. Take 1 capsule by mouth every 12 hours for 10 days. Refills: 0, Refill as needed: no, Allow substitutions: yes  "

## 2020-03-20 NOTE — PROGRESS NOTES
"Tiffany Sanz is a 39 year old female who is being evaluated via a billable telephone visit.      The patient has been notified of following:     \"This telephone visit will be conducted via a call between you and your physician/provider. We have found that certain health care needs can be provided without the need for a physical exam.  This service lets us provide the care you need with a short phone conversation.  If a prescription is necessary we can send it directly to your pharmacy.  If lab work is needed we can place an order for that and you can then stop by our lab to have the test done at a later time.    If during the course of the call the physician/provider feels a telephone visit is not appropriate, you will not be charged for this service.\"     Tiffany Sanz complains of    Chief Complaint   Patient presents with     Hypertension     Dizziness       I have reviewed and updated the patient's Past Medical History, Social History, Family History and Medication List.    ALLERGIES  Sulfa drugs    Hypertension Follow-up      Do you check your blood pressure regularly outside of the clinic? No     Are you following a low salt diet? Yes    Are your blood pressures ever more than 140 on the top number (systolic) OR more   than 90 on the bottom number (diastolic), for example 140/90? No   No chest pain  No severe headaches  No leg edema  No sudden loss of vision   Some anxiety about what is going on with the corona vir    Additional provider notes:     Dizziness  Duration of complaint: Did on care, has been having symptoms for 2-3 days   No falls  No loss of consciousness  No fever  Fluids can help    Infant was hospitalized for 14 days     Description:   Feeling faint:  YES  Feeling like the surroundings are moving: no   Worse with activity/head movement: no   Loss of consciousness: no   Falls: no   Nausea/vomitting: no   Vision or speech changes: no   Ringing in ears (Tinnitus): no   Hearing loss related to " dizziness: no   New medications which may be causing symptoms: no   Therapies tried and outcome: None    Assessment/Plan:  1. Breast feeding status of mother  - Prenatal Vit-Fe Fumarate-FA (PRENATAL MULTIVITAMIN W/IRON) 27-0.8 MG tablet; Take 1 tablet by mouth daily  Dispense: 90 tablet; Refill: 1  - acetaminophen (TYLENOL) 500 MG tablet; Take 1-2 tablets (500-1,000 mg) by mouth every 6 hours as needed for mild pain  Dispense: 90 tablet; Refill: 0    2. Essential hypertension  -Unable to check at home  -will refill medication for now  - labetalol (NORMODYNE) 100 MG tablet; Take 1 tablet (100 mg) by mouth daily  Dispense: 90 tablet; Refill: 0      3. Dizziness  -symptoms appear to be orthostatic in nature  -stay hydrated  -Fall precautions  -if symptoms worsen then will need clinic evaluation  ER precaution: increased dizziness, severe headaches, loss of consciousness     Phone call duration:  10 minutes    Estefania Berrios MD MPH

## 2020-06-05 ENCOUNTER — VIRTUAL VISIT (OUTPATIENT)
Dept: OBGYN | Facility: CLINIC | Age: 40
End: 2020-06-05
Payer: COMMERCIAL

## 2020-06-05 DIAGNOSIS — R30.0 DYSURIA: Primary | ICD-10-CM

## 2020-06-05 PROCEDURE — 99213 OFFICE O/P EST LOW 20 MIN: CPT | Mod: TEL | Performed by: OBSTETRICS & GYNECOLOGY

## 2020-06-05 RX ORDER — NITROFURANTOIN 25; 75 MG/1; MG/1
100 CAPSULE ORAL 2 TIMES DAILY
Qty: 14 CAPSULE | Refills: 0 | Status: SHIPPED | OUTPATIENT
Start: 2020-06-05 | End: 2020-10-10

## 2020-06-05 NOTE — PROGRESS NOTES
"Tiffany Sanz is a 40 year old female who is being evaluated via a billable telephone visit.      The patient has been notified of following:     \"This telephone visit will be conducted via a call between you and your physician/provider. We have found that certain health care needs can be provided without the need for a physical exam.  This service lets us provide the care you need with a short phone conversation.  If a prescription is necessary we can send it directly to your pharmacy.  If lab work is needed we can place an order for that and you can then stop by our lab to have the test done at a later time.    Telephone visits are billed at different rates depending on your insurance coverage. During this emergency period, for some insurers they may be billed the same as an in-person visit.  Please reach out to your insurance provider with any questions.    If during the course of the call the physician/provider feels a telephone visit is not appropriate, you will not be charged for this service.\"    Patient has given verbal consent for Telephone visit?  Yes    What phone number would you like to be contacted at? [phone]    How would you like to obtain your AVS? Olena Allen is a 40 year old  referred here by self(telephone visit) for consultation regarding painful urination since this AM. Denies any vaginal bleeding, discharge or itching.h/o HSV, but no lesions are seen or felt by her. Denies pelvic pain. Uses BTL for contraception. LMP was 1 week ago. History of dyspareunia as documented on postpartum visit on 3/4/20,but none with last coitus yesterday.  Denies fever or chills.  ROS: Ten point review of systems was reviewed and negative except the above.        Past Medical History:   Diagnosis Date     Chlamydia 2016     Genital herpes      History of transfusion of packed RBC 2017     Hyperlipidemia 3/21/2019    Overview:  Created by Conversion     Morbid obesity (H) 2018     Past Surgical " History:   Procedure Laterality Date      SECTION  2017      SECTION, TUBAL LIGATION, COMBINED       HC INSERT IMPLANTABLE CONTRACEPTIVE CAPSULE      Nexplanon     HC INSERTION INTRAUTERINE DEVICE  2017    ParaGard     HC REMOVAL IMPLATABLE CONTRACEPTIVE CAPSULE       HC REMOVE INTRAUTERINE DEVICE  2017     INCISION/DRAIN ABSCESS EXTRA  2019    skin of right breast     Patient Active Problem List   Diagnosis     CARDIOVASCULAR SCREENING; LDL GOAL LESS THAN 160     Morbid obesity (H)     Subserous leiomyoma of uterus     Hyperlipidemia     Genital herpes       ALL/Meds: Her medication and allergy histories were reviewed and are documented in their appropriate chart areas.    SH: - tob, - EtOH,     FH: Her family history was reviewed and documented in its appropriate chart area.    A/P    ICD-10-CM    1. Dysuria  R30.0 *UA reflex to Microscopic     nitroFURantoin macrocrystal-monohydrate (MACROBID) 100 MG capsule     She agrees to treat presumptive UTI treatment with abx.   She will stop bt the [facility] lab to give a urine sample on her way to  her prescription  She will be notified of the urinalysis result.  To call or go to UC/ED if she worsens.  CEPHAS AGBEH, MD.   Phone call duration: 20 minutes    Cephas Mawuena Agbeh, MD

## 2020-08-28 ENCOUNTER — TELEPHONE (OUTPATIENT)
Dept: SURGERY | Facility: CLINIC | Age: 40
End: 2020-08-28

## 2020-08-28 PROBLEM — I10 ESSENTIAL HYPERTENSION: Status: ACTIVE | Noted: 2020-08-28

## 2020-08-28 NOTE — TELEPHONE ENCOUNTER
lvm for pt reminding to fill out new pt questionnaire prior to visit on Monday    Lindsaycarl Sparrow MA

## 2020-08-31 ENCOUNTER — TELEPHONE (OUTPATIENT)
Dept: SURGERY | Facility: CLINIC | Age: 40
End: 2020-08-31

## 2020-09-02 ENCOUNTER — TELEPHONE (OUTPATIENT)
Dept: SURGERY | Facility: CLINIC | Age: 40
End: 2020-09-02

## 2020-09-02 NOTE — TELEPHONE ENCOUNTER
lvm letting pt know to fill out required new pt questionnaire located in [facility] prior to visit or she will need to be rescheduled    Lindsay Sparrow MA

## 2020-09-03 ENCOUNTER — TELEPHONE (OUTPATIENT)
Dept: SURGERY | Facility: CLINIC | Age: 40
End: 2020-09-03

## 2020-09-03 NOTE — TELEPHONE ENCOUNTER
lvm about filling out required new pt questionnaire prior to visit tomorrow    Lindsay Sparrow MA

## 2020-10-08 ENCOUNTER — OFFICE VISIT (OUTPATIENT)
Dept: OBGYN | Facility: CLINIC | Age: 40
End: 2020-10-08
Payer: COMMERCIAL

## 2020-10-08 VITALS
OXYGEN SATURATION: 98 % | HEART RATE: 75 BPM | TEMPERATURE: 99.4 F | DIASTOLIC BLOOD PRESSURE: 97 MMHG | SYSTOLIC BLOOD PRESSURE: 158 MMHG

## 2020-10-08 DIAGNOSIS — N89.8 VAGINAL DISCHARGE: Primary | ICD-10-CM

## 2020-10-08 DIAGNOSIS — N76.0 BV (BACTERIAL VAGINOSIS): ICD-10-CM

## 2020-10-08 DIAGNOSIS — R30.0 DYSURIA: ICD-10-CM

## 2020-10-08 DIAGNOSIS — B96.89 BV (BACTERIAL VAGINOSIS): ICD-10-CM

## 2020-10-08 DIAGNOSIS — I10 ESSENTIAL HYPERTENSION: ICD-10-CM

## 2020-10-08 DIAGNOSIS — Z12.4 SCREENING FOR MALIGNANT NEOPLASM OF CERVIX: ICD-10-CM

## 2020-10-08 LAB
SPECIMEN SOURCE: ABNORMAL
WET PREP SPEC: ABNORMAL

## 2020-10-08 PROCEDURE — 87624 HPV HI-RISK TYP POOLED RSLT: CPT | Performed by: OBSTETRICS & GYNECOLOGY

## 2020-10-08 PROCEDURE — 87210 SMEAR WET MOUNT SALINE/INK: CPT | Performed by: OBSTETRICS & GYNECOLOGY

## 2020-10-08 PROCEDURE — 87086 URINE CULTURE/COLONY COUNT: CPT | Performed by: OBSTETRICS & GYNECOLOGY

## 2020-10-08 PROCEDURE — 87491 CHLMYD TRACH DNA AMP PROBE: CPT | Performed by: OBSTETRICS & GYNECOLOGY

## 2020-10-08 PROCEDURE — 99214 OFFICE O/P EST MOD 30 MIN: CPT | Performed by: OBSTETRICS & GYNECOLOGY

## 2020-10-08 PROCEDURE — G0145 SCR C/V CYTO,THINLAYER,RESCR: HCPCS | Performed by: OBSTETRICS & GYNECOLOGY

## 2020-10-08 PROCEDURE — 87591 N.GONORRHOEAE DNA AMP PROB: CPT | Performed by: OBSTETRICS & GYNECOLOGY

## 2020-10-08 RX ORDER — LABETALOL 100 MG/1
200 TABLET, FILM COATED ORAL 2 TIMES DAILY
Qty: 120 TABLET | Refills: 0 | Status: SHIPPED | OUTPATIENT
Start: 2020-10-08 | End: 2021-07-19

## 2020-10-08 NOTE — PATIENT INSTRUCTIONS
If you have any questions regarding your visit, Please contact your care team.     [employer]: [phone]  [facility] CLINIC HOURS TELEPHONE NUMBER       Jules Barclay M.D.      Malgorzata Gonzales-Medical Assistant    Matilde-  Joanna-     Monday-[place]  8:00a.m-4:45 p.m  Tuesday-[facility]  9:00a.m-4:00 p.m  Wednesday-[facility] 8:00a.m-4:45 p.m.  Thursday-[facility]  8:00a.m-4:45 p.m.  Friday-[facility]  8:00a.m-4:45 p.m. [facility]  [address]  [place], [place] [address] [phone] ask [facility]  [phone] Fax  Imaging [phone]    [facility]  [address]  [place], [place] [address] [phone]    [facility]  [address] [facility] [address] [facility] [place] [address] [phone] [facility]  [phone] Fax  Imaging [phone]     Urgent Care locations:    [facility] Monday-Friday  5 pm - 9 pm  Saturday and Sunday   9 am - 5 pm  Monday-Friday   11 am - 9 pm  Saturday and Sunday   9 am - 5 pm   [phone]   If you need a medication refill, please contact your pharmacy. Please allow 3 business days for your refill to be completed.  As always, Thank you for trusting us with your healthcare needs!

## 2020-10-09 LAB
BACTERIA SPEC CULT: NORMAL
C TRACH DNA SPEC QL NAA+PROBE: NEGATIVE
Lab: NORMAL
N GONORRHOEA DNA SPEC QL NAA+PROBE: NEGATIVE
SPECIMEN SOURCE: NORMAL

## 2020-10-10 RX ORDER — METRONIDAZOLE 500 MG/1
500 TABLET ORAL 2 TIMES DAILY
Qty: 14 TABLET | Refills: 0 | Status: SHIPPED | OUTPATIENT
Start: 2020-10-10 | End: 2020-10-17

## 2020-10-10 NOTE — PROGRESS NOTES
OB-GYN Problem-Oriented Visit or Consultation      Tiffany Sanz is a 40 year old year old P 3 who presents with a chief complaint of several concerns.  Referred by self.  Patient's last menstrual period was 09/27/2020.    HPI:     Had some burning sensation after vulvar shaving and has stopped this. Some dysuria also. Notes headache at times associated with sex and BP is up. Taking low dose labetalol. Weight up. Desires limited STD screening. Menses q 28-30 days x 4-5 days. Contraceptive method is TL. Here with infant son.     Past medical, obstetrical, surgical, family and social history reviewed and as noted or updated in chart.     Allergies, meds and supplements are as noted or updated in chart.      ROS:   Systems reviewed include:  constitutional, cardiac, respiratory, gastrointestinal, genitourinary, integumentary, psychological, hematologic/lymphatic and endocrine.    These systems were negative for significant symptoms except for the following additional: none; see HPI.    EXAM:  VS as noted. BP (!) 158/97 (BP Location: Left arm, Patient Position: Chair, Cuff Size: Adult Large)   Pulse 75   Temp 99.4  F (37.4  C) (Oral)   LMP 09/27/2020   SpO2 98%   Breastfeeding No   Constitutionally normal. BP is high.   Abd and pelvis were  normal or negative except for, or in particular noting, the following  pertinent findings: mod white thin vaginal discharge, non-tender.       Assessment:   Encounter Diagnoses   Name Primary?     Vaginal discharge Yes     Essential hypertension      Dysuria      Screening for malignant neoplasm of cervix          Plan and Recommendations:   Total encounter time (physician together with patient) = 25min. Direct counseling, education and care coordination time (physician together with patient) = 15min.  This included the following:   I reviewed the condition, causes, differential diagnosis, prognosis, evaluation and management considerations and options.  Questions answered and  information given. See orders.     Await tests. Advise increased labetalol now and IM/FP follow-up for E&M.   Encouraged weight loss and program.   Medications and prescriptions given as noted.  I reviewed side effects, risks, benefits and instructions on proper use.        A/P:  Tiffany was seen today for gyn exam.    Diagnoses and all orders for this visit:    Vaginal discharge  -     Wet prep  -     Chlamydia trachomatis PCR  -     Neisseria gonorrhoeae PCR    Essential hypertension  -     labetalol (NORMODYNE) 100 MG tablet; Take 2 tablets (200 mg) by mouth 2 times daily    Dysuria  -     Urine Culture Aerobic Bacterial    Screening for malignant neoplasm of cervix  -     Pap imaged thin layer screen with HPV - recommended age 30 - 65 years (select HPV order below)  -     HPV High Risk Types DNA Cervical    BV (bacterial vaginosis)  -     metroNIDAZOLE (FLAGYL) 500 MG tablet; Take 1 tablet (500 mg) by mouth 2 times daily for 7 days        Jules Barclay MD

## 2020-10-14 LAB
COPATH REPORT: NORMAL
PAP: NORMAL

## 2020-10-16 LAB
FINAL DIAGNOSIS: NORMAL
HPV HR 12 DNA CVX QL NAA+PROBE: NEGATIVE
HPV16 DNA SPEC QL NAA+PROBE: NEGATIVE
HPV18 DNA SPEC QL NAA+PROBE: NEGATIVE
SPECIMEN DESCRIPTION: NORMAL
SPECIMEN SOURCE CVX/VAG CYTO: NORMAL

## 2020-11-08 ENCOUNTER — HEALTH MAINTENANCE LETTER (OUTPATIENT)
Age: 40
End: 2020-11-08

## 2020-11-17 ENCOUNTER — VIRTUAL VISIT (OUTPATIENT)
Dept: FAMILY MEDICINE | Facility: CLINIC | Age: 40
End: 2020-11-17
Payer: COMMERCIAL

## 2020-11-17 DIAGNOSIS — B35.0 TINEA CAPITIS: Primary | ICD-10-CM

## 2020-11-17 PROCEDURE — 99213 OFFICE O/P EST LOW 20 MIN: CPT | Mod: 95 | Performed by: NURSE PRACTITIONER

## 2020-11-17 RX ORDER — KETOCONAZOLE 20 MG/ML
SHAMPOO TOPICAL
Qty: 120 ML | Refills: 1 | Status: SHIPPED | OUTPATIENT
Start: 2020-11-17 | End: 2021-07-22

## 2020-11-17 NOTE — PATIENT INSTRUCTIONS
Patient Education     Ringworm of the Scalp  Ringworm is caused by a fungus, not a worm. It can be spread from animals (such as cats and dogs) or people infected with the fungus. The infection starts as a small, red, itchy sore. It grows larger, in the shape of a round, 1-to-2-inch ring with clear skin in the center. When the fungus infects the scalp, it causes round bald patches that are itchy and flaky. Sometimes these areas may scar and the hair may not grow back.   Ringworm of the scalp can be hard to treat. You will need to take oral medicine for 2 to 12 weeks. Be sure to follow special instructions for taking the medicine. Creams and shampoos don't work to clear up the infection.   Home care  Follow these guidelines when caring for yourself at home:    It may take up to 12 weeks for the infection to fully clear. To stop it from coming back, keep taking the medicine until the rash is gone and your healthcare provider has told you to stop. Throw out any escamilla, hairbrushes, barrettes, hats, or other products that have touched your head. Or you can disinfect these items by soaking them in diluted chlorine bleach. (Use 1/4 cup bleach per gallon of water). Clean towels, pillowcases, sheets, and other linens or clothing each time they may have touched the infected area. Wash them in hot water with a strong detergent. Dry them on high heat. Use a different towel to dry your head. Don t use this towel on the rest of your body.    Ringworm of the scalp is very contagious. This means it spreads easily to other people. Other family members may need to be treated. Don't share hats, escamilla, hairbrushes, towels, pillowcases, or helmets while infected. Any child with ringworm of the scalp should stay out of school or day care until prescription medicine is started, or until the healthcare provider says it is OK to return. Your child should not play contact sports until the provider says it is OK.    Shampooing with a  medicated shampoo will help keep the ringworm from spreading. It may lessen the chance of spreading it to other people. But it will not cure the ringworm.    You don't need to cut or shave the hair. This will not help.    Have your  check your pet for signs of ringworm.  Follow-up care  Follow up with your healthcare provider, or as advised.  When to seek medical advice  Call your healthcare provider right away if any of these occur:    Ringworm comes back    Scalp swelling or pain get worse    Fluid or pus drains from the rash    Fever in adults: 100.4 F (38.0 C) or above, lasting for 24 to 48 hours    Fever in a child (see Fever and children, below)  Fever and children  Always use a digital thermometer to check your child s temperature. Never use a mercury thermometer.   For infants and toddlers, be sure to use a rectal thermometer correctly. A rectal thermometer may accidentally poke a hole in (perforate) the rectum. It may also pass on germs from the stool. Always follow the product maker s directions for proper use. If you don t feel comfortable taking a rectal temperature, use another method. When you talk to your child s healthcare provider, tell him or her which method you used to take your child s temperature.   Here are guidelines for fever temperature. Ear temperatures aren t accurate before 6 months of age. Don t take an oral temperature until your child is at least 4 years old.   Infant under 3 months old:    Ask your child s healthcare provider how you should take the temperature.    Rectal or forehead (temporal artery) temperature of 100.4 F (38 C) or higher, or as directed by the provider    Armpit temperature of 99 F (37.2 C) or higher, or as directed by the provider  Child age 3 to 36 months:    Rectal, forehead (temporal artery), or ear temperature of 102 F (38.9 C) or higher, or as directed by the provider    Armpit temperature of 101 F (38.3 C) or higher, or as directed by the  provider  Child of any age:    Repeated temperature of 104 F (40 C) or higher, or as directed by the provider    Fever that lasts more than 24 hours in a child under 2 years old. Or a fever that lasts for 3 days in a child 2 years or older.  [employer] last reviewed this educational content on 8/1/2019 2000-2020 [employer]. [address] [place] [address]. All rights reserved. This information is not intended as a substitute for professional medical care. Always follow your healthcare professional's instructions.

## 2020-11-17 NOTE — PROGRESS NOTES
"Tiffany Sanz is a 40 year old female who is being evaluated via a billable video visit.      The patient has been notified of following:     \"This video visit will be conducted via a call between you and your physician/provider. We have found that certain health care needs can be provided without the need for an in-person physical exam.  This service lets us provide the care you need with a video conversation.  If a prescription is necessary we can send it directly to your pharmacy.  If lab work is needed we can place an order for that and you can then stop by our lab to have the test done at a later time.    Video visits are billed at different rates depending on your insurance coverage.  Please reach out to your insurance provider with any questions.    If during the course of the call the physician/provider feels a video visit is not appropriate, you will not be charged for this service.\"    Patient has given verbal consent for Video visit? Yes  How would you like to obtain your AVS? MyChart  If you are dropped from the video visit, the video invite should be resent to: Text to cell phone: [phone]  Will anyone else be joining your video visit? No    Subjective     Tiffany Sanz is a 40 year old female who presents today via video visit for the following health issues:    HPI     Possible Ring Worm  On her scalp. Hair is falling out  Has had more than 6 mo. Has dx with alopecia and dry scalp and now it has gone to her family members.             Video Start Time: 11:19 am    Had a baby in Jan and then COVID hit  Lots of stress  Hair falling out in patches  Had same problem few years ago and it was ringworm  Itches a little  Not anywhere else  Both kids with ringworm on their feet and hands    Review of Systems   Constitutional, HEENT, cardiovascular, pulmonary, gi and gu systems are negative, except as otherwise noted.      Objective           Vitals:  No vitals were obtained today due to virtual " visit.    Physical Exam     GENERAL: Healthy, alert and no distress  EYES: Eyes grossly normal to inspection.  No discharge or erythema, or obvious scleral/conjunctival abnormalities.  RESP: No audible wheeze, cough, or visible cyanosis.  No visible retractions or increased work of breathing.    SKIN: Visible skin clear. No significant rash, abnormal pigmentation or lesions. Difficult to assess scalp via video  NEURO: Cranial nerves grossly intact.  Mentation and speech appropriate for age.  PSYCH: Mentation appears normal, affect normal/bright, judgement and insight intact, normal speech and appearance well-groomed.              Assessment & Plan     Tinea capitis  Trial the below. Follow up in clinic in 4 weeks if symptoms persist.  - ketoconazole (NIZORAL) 2 % external shampoo; Apple twice weekly for 5 min and then rinse. Do for 4 weeks and then follow up.        See Patient Instructions    No follow-ups on file.     The benefits, risks and potential side effects were discussed in detail. Black box warnings discussed as relevant. All patient questions were answered. The patient was instructed to follow up immediately if any adverse reactions develop.    Return precautions discussed, including when to seek urgent/emergent care.    Patient verbalizes understanding and agrees with plan of care.       MAYANK Sawyer CNP  [facility]      Video-Visit Details    Type of service:  Video Visit    Video End Time:11:30    Originating Location (pt. Location): Home    Distant Location (provider location):  [facility]     Platform used for Video Visit: [employer]

## 2020-12-14 ENCOUNTER — ANCILLARY PROCEDURE (OUTPATIENT)
Dept: MAMMOGRAPHY | Facility: CLINIC | Age: 40
End: 2020-12-14
Attending: PREVENTIVE MEDICINE
Payer: COMMERCIAL

## 2020-12-14 DIAGNOSIS — Z12.31 VISIT FOR SCREENING MAMMOGRAM: ICD-10-CM

## 2020-12-14 PROCEDURE — 77067 SCR MAMMO BI INCL CAD: CPT | Performed by: RADIOLOGY

## 2020-12-31 ENCOUNTER — TELEPHONE (OUTPATIENT)
Dept: FAMILY MEDICINE | Facility: CLINIC | Age: 40
End: 2020-12-31

## 2020-12-31 ENCOUNTER — TELEPHONE (OUTPATIENT)
Dept: URGENT CARE | Facility: CLINIC | Age: 40
End: 2020-12-31

## 2020-12-31 DIAGNOSIS — U07.1 2019 NOVEL CORONAVIRUS DISEASE (COVID-19): Primary | ICD-10-CM

## 2020-12-31 NOTE — TELEPHONE ENCOUNTER
Patient diagnosed with covid  I am speaking to her as the mother of 3 children in virtual urgent care   Mild symptoms  Doing mostly well  She is very anxious about covid and would like to see if she can get any type of treatment   She has a BMI of 42 thus will qualify.  She is interested in this.  Referral placed.   Marina Winn M.D.

## 2021-03-22 ENCOUNTER — OFFICE VISIT (OUTPATIENT)
Dept: OBGYN | Facility: CLINIC | Age: 41
End: 2021-03-22
Payer: COMMERCIAL

## 2021-03-22 VITALS
WEIGHT: 293 LBS | BODY MASS INDEX: 48.86 KG/M2 | SYSTOLIC BLOOD PRESSURE: 148 MMHG | DIASTOLIC BLOOD PRESSURE: 87 MMHG | HEART RATE: 79 BPM | OXYGEN SATURATION: 98 %

## 2021-03-22 DIAGNOSIS — M54.50 MIDLINE LOW BACK PAIN WITHOUT SCIATICA, UNSPECIFIED CHRONICITY: ICD-10-CM

## 2021-03-22 DIAGNOSIS — R10.9 ABDOMINAL PAIN, UNSPECIFIED ABDOMINAL LOCATION: Primary | ICD-10-CM

## 2021-03-22 DIAGNOSIS — D50.0 IRON DEFICIENCY ANEMIA DUE TO CHRONIC BLOOD LOSS: ICD-10-CM

## 2021-03-22 DIAGNOSIS — E66.01 MORBID OBESITY (H): ICD-10-CM

## 2021-03-22 LAB
ALBUMIN UR-MCNC: NEGATIVE MG/DL
APPEARANCE UR: CLEAR
BASOPHILS # BLD AUTO: 0 10E9/L (ref 0–0.2)
BASOPHILS NFR BLD AUTO: 0.4 %
BILIRUB UR QL STRIP: NEGATIVE
COLOR UR AUTO: ABNORMAL
DIFFERENTIAL METHOD BLD: NORMAL
EOSINOPHIL # BLD AUTO: 0.1 10E9/L (ref 0–0.7)
EOSINOPHIL NFR BLD AUTO: 1.9 %
ERYTHROCYTE [DISTWIDTH] IN BLOOD BY AUTOMATED COUNT: 16.4 % (ref 10–15)
GLUCOSE UR STRIP-MCNC: NEGATIVE MG/DL
HCT VFR BLD AUTO: 32.7 % (ref 35–47)
HGB BLD-MCNC: 9.8 G/DL (ref 11.7–15.7)
HGB UR QL STRIP: NEGATIVE
IMM GRANULOCYTES # BLD: 0 10E9/L (ref 0–0.4)
IMM GRANULOCYTES NFR BLD: 0.3 %
KETONES UR STRIP-MCNC: NEGATIVE MG/DL
LEUKOCYTE ESTERASE UR QL STRIP: NEGATIVE
LYMPHOCYTES # BLD AUTO: 1.7 10E9/L (ref 0.8–5.3)
LYMPHOCYTES NFR BLD AUTO: 25.8 %
MCH RBC QN AUTO: 24.3 PG (ref 26.5–33)
MCHC RBC AUTO-ENTMCNC: 30 G/DL (ref 31.5–36.5)
MCV RBC AUTO: 81 FL (ref 78–100)
MONOCYTES # BLD AUTO: 0.7 10E9/L (ref 0–1.3)
MONOCYTES NFR BLD AUTO: 9.9 %
NEUTROPHILS # BLD AUTO: 4.1 10E9/L (ref 1.6–8.3)
NEUTROPHILS NFR BLD AUTO: 61.7 %
NITRATE UR QL: NEGATIVE
NON-SQ EPI CELLS #/AREA URNS LPF: ABNORMAL /LPF
PH UR STRIP: 7 PH (ref 5–7)
PLATELET # BLD AUTO: 226 10E9/L (ref 150–450)
RBC # BLD AUTO: 4.04 10E12/L (ref 3.8–5.2)
RBC #/AREA URNS AUTO: ABNORMAL /HPF
SOURCE: ABNORMAL
SP GR UR STRIP: 1.02 (ref 1–1.03)
UROBILINOGEN UR STRIP-MCNC: NORMAL MG/DL (ref 0–2)
WBC # BLD AUTO: 7 10E9/L (ref 4–11)
WBC #/AREA URNS AUTO: ABNORMAL /HPF

## 2021-03-22 PROCEDURE — 36415 COLL VENOUS BLD VENIPUNCTURE: CPT | Performed by: OBSTETRICS & GYNECOLOGY

## 2021-03-22 PROCEDURE — 85027 COMPLETE CBC AUTOMATED: CPT | Performed by: OBSTETRICS & GYNECOLOGY

## 2021-03-22 PROCEDURE — 85004 AUTOMATED DIFF WBC COUNT: CPT | Performed by: OBSTETRICS & GYNECOLOGY

## 2021-03-22 PROCEDURE — 81001 URINALYSIS AUTO W/SCOPE: CPT | Performed by: OBSTETRICS & GYNECOLOGY

## 2021-03-22 PROCEDURE — 99214 OFFICE O/P EST MOD 30 MIN: CPT | Performed by: OBSTETRICS & GYNECOLOGY

## 2021-03-22 NOTE — PATIENT INSTRUCTIONS
If you have any questions regarding your visit, Please contact your care team.     [facility]: [phone]  Women s Health CLINIC HOURS TELEPHONE NUMBER       Jules Barclay M.D.    [place]-REA Wiley-REA Gonzales-Medical Assistant    Matilde-  Joanna-     Monday-[place]  8:00a.m-4:45 p.m  Tuesday-[place]  9:00a.m-4:00 p.m  Wednesday-[place] 8:00a.m-4:45 p.m.  Thursday-[place]  8:00a.m-4:45 p.m.  Friday-[place]  8:00a.m-4:45 p.m. [facility]  [id number] [place] [id number] [phone] ask for [facility]  [phone] Fax  Imaging [phone]    [facility]  [address]  [place], [place] [address] [phone]    [facility]  [address] [place]  [place] [address] [phone] ask [facility]  [phone] Fax  Imaging [phone]     Urgent Care locations:    [place]        [place] Monday-Friday  5 pm - 9 pm  Saturday and Sunday   9 am - 5 pm  Monday-Friday   11 am - 9 pm  Saturday and Sunday   9 am - 5 pm   [phone]   If you need a medication refill, please contact your pharmacy. Please allow 3 business days for your refill to be completed.  As always, Thank you for trusting us with your healthcare needs!

## 2021-03-23 NOTE — PROGRESS NOTES
OB-GYN Problem-Oriented Visit or Consultation      Tiffany Sanz is a 40 year old year old P 3 who presents with a chief complaint of abd suprapubic pain.  Referred by self.  Patient's last menstrual period was 03/10/2021 (approximate).    Assessment:   Encounter Diagnoses   Name Primary?     Abdominal pain, unspecified abdominal location Yes     Midline low back pain without sciatica, unspecified chronicity      Morbid obesity (H)          Plan and Recommendations: May continue with symptomatic treatment for now. Check labs. If continuing problem then we can assess further with pelvic ultrasound to recheck fibroids or CT scan. May need back E&M per FP. Weight Management order will be renewed and weight loss encouraged.   I reviewed the condition, causes, differential diagnosis, prognosis, evaluation and management considerations and options.  Questions answered and information given. See orders.   35 minutes spent on the date of encounter doing chart review, history, examination, discussion with patient, and documentation, and further activities as noted, and review of appropriateness of decision-making for care, and review of test results, and interpretation of test results.  A/P:  Tiffany was seen today for follow up.    Diagnoses and all orders for this visit:    Abdominal pain, unspecified abdominal location  -     CBC with platelets  -     Cancel: UA with Microscopic reflex to Culture  -     UA with Microscopic reflex to Culture  -     Cancel: DIFFERENTIAL  -     Differential    Midline low back pain without sciatica, unspecified chronicity  -     CBC with platelets  -     Cancel: UA with Microscopic reflex to Culture  -     Cancel: DIFFERENTIAL    Morbid obesity (H)  -     COMPREHENSIVE WEIGHT MANAGEMENT        Jules Barclay MD    HPI:     For past 6-8 weeks has noted low abd suprapubic pain that sometimes limits mobility. Wonders about umbilical hernia although previously Gen Surg did not advise it needed  repair. No trauma. Some constipation tendencies but this is chronic. No other specific or GI/ symptoms. Also low back paraspinal pain and has used ibuprofen. Menses q 28-30 days x 5 days.   Contraceptive method is TL. Here with youngest son- doing well.     Past medical, obstetrical, surgical, family and social history reviewed and as noted or updated in chart.     Allergies, meds and supplements are as noted or updated in chart.      ROS:   Systems reviewed include:  constitutional, gastrointestinal, genitourinary, musculoskeletal, integumentary, psychological, hematologic/lymphatic and endocrine.    These systems were negative for significant symptoms except for the following additional: none; see HPI.    EXAM:  VS as noted. BP (!) 148/87 (BP Location: Right arm, Patient Position: Chair, Cuff Size: Adult Large)   Pulse 79   Wt 137.9 kg (304 lb)   LMP 03/10/2021 (Approximate)   SpO2 98%   Breastfeeding No   BMI 48.86 kg/m    Constitutionally normal.     Back, Abd and Pelvis are  normal or negative except for, or in particular noting, the following  pertinent findings: mild low back paraspinal tenderness, healed midline abd surg scar, no hernia noted, focal suprapubic tenderness without mass, no acute findings or peritoneal signs.       Jules Barclay MD

## 2021-04-14 ENCOUNTER — NURSE TRIAGE (OUTPATIENT)
Dept: FAMILY MEDICINE | Facility: CLINIC | Age: 41
End: 2021-04-14

## 2021-04-14 NOTE — TELEPHONE ENCOUNTER
"    Additional Information    Negative: Difficult to awaken or acting confused (e.g., disoriented, slurred speech)    Negative: Weakness of the face, arm or leg on one side of the body and new onset    Negative: Numbness of the face, arm or leg on one side of the body and new onset    Negative: Loss of speech or garbled speech and new onset    Negative: Passed out (i.e., fainted, collapsed and was not responding)    Negative: Sounds like a life-threatening emergency to the triager    Negative: Followed a head injury within last 3 days    Negative: Traumatic Brain Injury (TBI) is suspected    Negative: Sinus pain of forehead and yellow or green nasal discharge    Negative: Pregnant    Negative: Unable to walk without falling    Negative: Stiff neck (can't touch chin to chest)    Negative: Possibility of carbon monoxide exposure    SEVERE headache, states 'worst headache' of life    Answer Assessment - Initial Assessment Questions  1. LOCATION: \"Where does it hurt?\"       Headache, constant  2. ONSET: \"When did the headache start?\" (Minutes, hours or days)       Started 7 days ago  3. PATTERN: \"Does the pain come and go, or has it been constant since it started?\"      Comes and goes, then lingers, sometimes it hurts a lot  4. SEVERITY: \"How bad is the pain?\" and \"What does it keep you from doing?\"  (e.g., Scale 1-10; mild, moderate, or severe)    - MILD (1-3): doesn't interfere with normal activities     - MODERATE (4-7): interferes with normal activities or awakens from sleep     - SEVERE (8-10): excruciating pain, unable to do any normal activities         Sometimes a 8-9/10 currently 5/10   5. RECURRENT SYMPTOM: \"Have you ever had headaches before?\" If so, ask: \"When was the last time?\" and \"What happened that time?\"       no  6. CAUSE: \"What do you think is causing the headache?\"      covid vaccine, diana and diana on March 21.  Hypertension in past, prediabetic before last pregnancy.  7. MIGRAINE: \"Have you " "been diagnosed with migraine headaches?\" If so, ask: \"Is this headache similar?\"       no  8. HEAD INJURY: \"Has there been any recent injury to the head?\"       none  9. OTHER SYMPTOMS: \"Do you have any other symptoms?\" (fever, stiff neck, eye pain, sore throat, cold symptoms)      None  10. PREGNANCY: \"Is there any chance you are pregnant?\" \"When was your last menstrual period?\"        nope    Protocols used: HEADACHE-A-OH      "

## 2021-04-19 ENCOUNTER — TELEPHONE (OUTPATIENT)
Dept: SURGERY | Facility: CLINIC | Age: 41
End: 2021-04-19

## 2021-04-19 NOTE — TELEPHONE ENCOUNTER
Spoke to patient on 04/19/2021 reminding them to fill out the new patient questionnaire before their appointment.    Lindsay Sparrow MA

## 2021-04-20 ENCOUNTER — VIRTUAL VISIT (OUTPATIENT)
Dept: SURGERY | Facility: CLINIC | Age: 41
End: 2021-04-20
Payer: COMMERCIAL

## 2021-04-20 VITALS — WEIGHT: 293 LBS | BODY MASS INDEX: 47.09 KG/M2 | HEIGHT: 66 IN

## 2021-04-20 DIAGNOSIS — Z13.29 SCREENING FOR ENDOCRINE, NUTRITIONAL, METABOLIC AND IMMUNITY DISORDER: ICD-10-CM

## 2021-04-20 DIAGNOSIS — Z13.21 SCREENING FOR ENDOCRINE, NUTRITIONAL, METABOLIC AND IMMUNITY DISORDER: ICD-10-CM

## 2021-04-20 DIAGNOSIS — E66.01 MORBID OBESITY (H): ICD-10-CM

## 2021-04-20 DIAGNOSIS — D50.0 IRON DEFICIENCY ANEMIA DUE TO CHRONIC BLOOD LOSS: ICD-10-CM

## 2021-04-20 DIAGNOSIS — Z13.228 SCREENING FOR ENDOCRINE, NUTRITIONAL, METABOLIC AND IMMUNITY DISORDER: ICD-10-CM

## 2021-04-20 DIAGNOSIS — Z13.0 SCREENING FOR ENDOCRINE, NUTRITIONAL, METABOLIC AND IMMUNITY DISORDER: ICD-10-CM

## 2021-04-20 DIAGNOSIS — I10 ESSENTIAL HYPERTENSION: Primary | ICD-10-CM

## 2021-04-20 DIAGNOSIS — E78.5 HYPERLIPIDEMIA, UNSPECIFIED HYPERLIPIDEMIA TYPE: ICD-10-CM

## 2021-04-20 PROCEDURE — 97802 MEDICAL NUTRITION INDIV IN: CPT | Mod: 95 | Performed by: DIETITIAN, REGISTERED

## 2021-04-20 PROCEDURE — 99215 OFFICE O/P EST HI 40 MIN: CPT | Mod: 95 | Performed by: PHYSICIAN ASSISTANT

## 2021-04-20 RX ORDER — PYRIDOXINE HCL (VITAMIN B6) 50 MG
TABLET ORAL
COMMUNITY
End: 2022-08-04

## 2021-04-20 RX ORDER — MULTIVIT-MIN/IRON/FOLIC ACID/K 18-600-40
CAPSULE ORAL
COMMUNITY
End: 2024-07-09

## 2021-04-20 ASSESSMENT — MIFFLIN-ST. JEOR: SCORE: 2057.28

## 2021-04-20 NOTE — PROGRESS NOTES
"Tiffany is a 40 year old who is being evaluated via a billable video visit.      If the video visit is dropped, the invitation should be resent by: Text to cell phone: [phone]  Will anyone else be joining your video visit? No      Video-Visit Details    Type of service:  Video Visit    Video Start Time: 10:03 AM    Video End Time:10:38 AM        Originating Location (pt. Location): Home    Distant Location (provider location):  [facility] [place]     Platform used for Video Visit: [facility] Bariatric Surgery Consultation Note    2021    RE: Tiffany Sanz  MR#: [id number]  : 1980      Referring provider:       2021   Who referred you? Dr Barclay       Chief Complaint/Reason for visit: evaluation for possible weight loss surgery    Dear No Ref-Primary, Physician (General),  Dr. Karl Desai    I had the pleasure of seeing your patient, Tiffany Sanz, to evaluate her obesity and consider her for possible weight loss surgery. As you know, Tiffany Sanz is 40 year old.  She has a height of 5' 5.5\"[pt reported[, a weight of 305 lbs 0 oz, and calculated Body mass index is 49.98 kg/m .     Assessment & Plan   Problem List Items Addressed This Visit     Morbid obesity (H)    Relevant Orders    CBC with platelets    Comprehensive metabolic panel    Hemoglobin A1c    Vitamin D Deficiency    NUTRITION REFERRAL    MENTAL HEALTH REFERRAL  - Adult; Assessments and Testing; Bariatric Eval; [facility]: [facility] [phone]; We will contact you to schedule the appointment or please call with any questions    Hyperlipidemia    Relevant Orders    Comprehensive metabolic panel    Essential hypertension - Primary    Relevant Orders    Comprehensive metabolic panel    Iron deficiency anemia due to chronic blood loss    Relevant Medications    Cyanocobalamin (B-12) 100 MCG TABS    Fe Bisgly-Succ-C-Thre-B12-FA (IRON-150 PO)    Other Relevant Orders    CBC with " platelets      Other Visit Diagnoses     Screening for iron deficiency anemia        Screening for endocrine, nutritional, metabolic and immunity disorder        Relevant Orders    Hemoglobin A1c    Vitamin D Deficiency           52 minutes spent on the date of the encounter doing chart review, history and exam, review test results, counseling, developing plan of care, documentation, and further activities as noted above.       HISTORY OF PRESENT ILLNESS:  Weight Loss History Reviewed with Patient 4/20/2021   What is the most that you have ever weighed? 305   What is the most weight you have lost? 20   I have tried the following methods to lose weight Watching portions or calories, Exercise   I have tried the following weight loss medications? (Check all that apply) Phentermine/Adipex-p/Suprenza   Have you ever had weight loss surgery? No   Pt is scared of surgery.  Wonders if there is other alternatives.  Prior to having kids she was at 200 lbs.  Following kids she was about 230 lbs and then continued.      Pt states she has trouble with snacking and eating fast food.  Is she doesn't sleep she will eat.      CO-MORBIDITIES OF OBESITY INCLUDE:     4/20/2021   I have the following health issues associated with obesity: High Blood Pressure, High Cholesterol   Pt is iron deficient.  She takes a MVI with iron daily.    Prediabetic.  Gestational diabetes    .  BP Readings from Last 6 Encounters:   03/22/21 (!) 148/87   10/08/20 (!) 158/97   03/04/20 137/88   02/21/20 (!) 168/98   02/11/20 138/84   01/13/20 (!) 141/88     PAST MEDICAL HISTORY:  Past Medical History:   Diagnosis Date     Chlamydia 2016     Essential hypertension 8/28/2020     Genital herpes 1999     History of transfusion of packed RBC 2017     Hyperlipidemia 3/21/2019    Overview:  Created by Conversion     Morbid obesity (H) 9/6/2018     Subserous leiomyoma of uterus 3/21/2019       PAST SURGICAL HISTORY: No history of  clotting, malignant hyperthermia,  or other anesthesia problems with surgery. Denies PONV.  Pt had a hemorrhage with both children.  Past Surgical History:   Procedure Laterality Date      SECTION  2017      SECTION, TUBAL LIGATION, COMBINED       HC INSERT IMPLANTABLE CONTRACEPTIVE CAPSULE      Nexplanon     HC INSERTION INTRAUTERINE DEVICE  2017    ParaGard     HC REMOVAL IMPLATABLE CONTRACEPTIVE CAPSULE       HC REMOVE INTRAUTERINE DEVICE  2017     INCISION/DRAIN ABSCESS EXTRA  2019    skin of right breast       FAMILY HISTORY:   Family History   Family history unknown: Yes   PGF  HTN  Diabetes    SOCIAL HISTORY:   Social History Questions Reviewed With Patient 2021   Which best describes your employment status (select all that apply) 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 Yes 9, 4, 1   Can you afford 3 meals a day?  Yes   Can you afford 50-60 dollars a month for vitamins? Yes       HABITS:     2021   How often do you drink alcohol? Monthly or less   If you do drink alcohol, how many drinks might you have in a day? (one drink = 5 oz. wine, 1 can/bottle of beer, 1 shot liquor) 1 or 2   Have you ever used any of the following nicotine products? No   Have you or are you currently using street drugs or prescription strength medication for which you do not have a prescription for? No   Do you have a history of chemical dependency (alcohol or drug abuse)? No       PSYCHOLOGICAL HISTORY:   Psychological History Reviewed With Patient 2021   Have you ever attempted suicide? Never.   Have you had thoughts of suicide in the past year? No   Have you ever been hospitalized for mental illness or a suicide attempt? Never.   Do you have a history of chronic pain? No   Have you ever been diagnosed with fibromyalgia? No   Are you currently seeing a therapist or counselor?  No   Are you currently seeing a psychiatrist? No       ROS:     2021    Skin:  None of the above   HEENT: Headaches   Musculoskeletal: Back pain   Cardiovascular: None of the above   Pulmonary: Snoring   Gastrointestinal: None of the above   Genitourinary: None of the above   Hematological: History of blood transfusions   Neurological: None of the above   Female only: Excessive menstrual bleeding, fibroids, monthly periods       EATING BEHAVIORS:     4/20/2021   Have you or anyone else thought that you had an eating disorder? No   Do you currently binge eat (eat a large amount of food in a short time)? No   Are you an emotional eater? No   Do you get up to eat after falling asleep? No       EXERCISE:     4/20/2021   How often do you exercise? Less than 1 time per week   What is the duration of your exercise (in minutes)? 45 Minutes   What types of exercise do you do? gym membership   What keeps you from being more active?  Too tired   Lives in [place].  Not comfortable going out of home right now due to protests.      MEDICATIONS: Vaccinated with J&J for COVID.    Current Outpatient Medications   Medication     Cholecalciferol (VITAMIN D) 50 MCG (2000 UT) CAPS     Cyanocobalamin (B-12) 100 MCG TABS     Fe Bisgly-Succ-C-Thre-B12-FA (IRON-150 PO)     Fluocinolone Acetonide Scalp 0.01 % OIL oil     ketoconazole (NIZORAL) 2 % external shampoo     labetalol (NORMODYNE) 100 MG tablet     Multiple Vitamins-Minerals (MULTIVITAL PO)     mupirocin (BACTROBAN) 2 % external ointment     permethrin (ELIMITE) 5 % external cream     Zinc Sulfate (ZINC 15 PO)     No current facility-administered medications for this visit.         ALLERGIES:  Allergies   Allergen Reactions     Sulfa Drugs Itching           LABS AND RECORDS REVIEWED:  Hemoglobin A1C   Date Value Ref Range Status   02/21/2020 5.3 0 - 5.6 % Final     Comment:     Normal <5.7% Prediabetes 5.7-6.4%  Diabetes 6.5% or higher - adopted from ADA   consensus guidelines.       Vitamin D Deficiency screening   Date Value Ref Range Status    02/21/2020 27 20 - 75 ug/L Final     Comment:     Season, race, dietary intake, and treatment affect the concentration of   25-hydroxy-Vitamin D. Values may decrease during winter months and increase   during summer months. Values 20-29 ug/L may indicate Vitamin D insufficiency   and values <20 ug/L may indicate Vitamin D deficiency.  Vitamin D determination is routinely performed by an immunoassay specific for   25 hydroxyvitamin D3.  If an individual is on vitamin D2 (ergocalciferol)   supplementation, please specify 25 OH vitamin D2 and D3 level determination by   LCMSMS test [id number].       TSH   Date Value Ref Range Status   02/26/2019 1.50 0.40 - 4.00 mU/L Final     Sodium   Date Value Ref Range Status   02/26/2019 140 133 - 144 mmol/L Final     Potassium   Date Value Ref Range Status   02/26/2019 4.0 3.4 - 5.3 mmol/L Final     Chloride   Date Value Ref Range Status   02/26/2019 107 94 - 109 mmol/L Final     Carbon Dioxide   Date Value Ref Range Status   02/26/2019 27 20 - 32 mmol/L Final     Anion Gap   Date Value Ref Range Status   02/26/2019 6 3 - 14 mmol/L Final     Glucose   Date Value Ref Range Status   02/26/2019 81 70 - 99 mg/dL Final     Comment:     Non Fasting     Urea Nitrogen   Date Value Ref Range Status   01/03/2020 8 7 - 30 mg/dL Final     Creatinine   Date Value Ref Range Status   01/03/2020 0.67 0.52 - 1.04 mg/dL Final     GFR Estimate   Date Value Ref Range Status   01/03/2020 >90 >60 mL/min/[1.73_m2] Final     Comment:     Non  GFR Calc  Starting 12/18/2018, serum creatinine based estimated GFR (eGFR) will be   calculated using the Chronic Kidney Disease Epidemiology Collaboration   (CKD-EPI) equation.       Calcium   Date Value Ref Range Status   02/26/2019 8.8 8.5 - 10.1 mg/dL Final     Bilirubin Total   Date Value Ref Range Status   02/26/2019 0.3 0.2 - 1.3 mg/dL Final     Alkaline Phosphatase   Date Value Ref Range Status   02/26/2019 77 40 - 150 U/L Final     ALT   Date  "Value Ref Range Status   01/03/2020 25 0 - 50 U/L Final     AST   Date Value Ref Range Status   01/03/2020 19 0 - 45 U/L Final     WBC   Date Value Ref Range Status   03/22/2021 7.0 4.0 - 11.0 10e9/L Final     Hemoglobin   Date Value Ref Range Status   03/22/2021 9.8 (L) 11.7 - 15.7 g/dL Final     Hematocrit   Date Value Ref Range Status   03/22/2021 32.7 (L) 35.0 - 47.0 % Final     MCV   Date Value Ref Range Status   03/22/2021 81 78 - 100 fl Final     Platelet Count   Date Value Ref Range Status   03/22/2021 226 150 - 450 10e9/L Final         PHYSICAL EXAM:  Ht 5' 5.5\" (1.664 m)   Wt 305 lb (138.3 kg)   BMI 49.98 kg/m    GENERAL: Healthy, alert and no distress  EYES: Eyes grossly normal to inspection.  No discharge or erythema, or obvious scleral/conjunctival abnormalities.  RESP: No audible wheeze, cough, or visible cyanosis.  No visible retractions or increased work of breathing.    SKIN: Visible skin clear. No significant rash, abnormal pigmentation or lesions.  NEURO: Cranial nerves grossly intact.  Mentation and speech appropriate for age.  PSYCH: Mentation appears normal, affect normal/bright, judgement and insight intact, normal speech and appearance well-groomed.      In summary, Tiffany Sanz has Class III obesity with a body mass index of Body mass index is 49.98 kg/m . kg/m2 and the comorbidities stated above. She completed an informational seminar and is a possible candidate for the laparoscopic gastric bypass.  She will have to complete the following pre-requisites:    Lose 5 lbs prior to surgery.  Goal Weight: 300 lbs    Have preoperative laboratory tests drawn.     Psychological Evaluation with MMPI and clearance for weight loss surgery.    Achieve clearance from dietitian to see surgeon.    See Primary Care provider regarding elevated blood pressure.    See Primary Care provider regarding iron deficiency anemia.  (ideally anemia resolved prior to surgery    Today in the office we discussed " gastric bypass surgery.  Preoperative, perioperative, and postoperative processes, management, and follow up were addressed.  Risks and benefits were outlined including the risk of death, staple line leak (1-2%), PE, DVT, ulcer, N/V, stricture, hernia, wound infection, weight regain, and vitamin deficiencies. I emphasized exercise and activity along with appropriate food choice as the main foundation for weight loss with surgery providing surgical reinforcement of this. All questions were answered.  A goal sheet and support group handout were given to the patient.        Once the patient has completed the requirements in the above tasklist and there are no further recommendations, pt will see the surgeon for consultation for bariatric surgery. Pt to follow up in 1-2 months for a face to face visit with me. We will discuss the available weight loss surgeries including risks and benefits,  get an official weight, and do a physical exam. Patient verbalizes understanding of the process to surgery and the post operative schedule.  All questions were answered.        Sincerely,     Irina Courtney PA-C

## 2021-04-20 NOTE — PROGRESS NOTES
"Video-Visit Details    Type of service:  Video Visit    Video Start Time (time video started): 11:00    Video End Time (time video stopped): 11:37    Originating Location (pt. Location): Home    Distant Location (provider location):  [facility] [place]     Mode of Communication:  Video Conference via Mailcloud      New Bariatric Nutrition Consultation Note    Reason For Visit: Nutrition Assessment    Tiffany Sanz is a 40 year old presenting today for new bariatric nutrition consult.  Pt is interested in laparoscopic undecided.  Patient is accompanied by self.    No flowsheet data found.    ANTHROPOMETRICS:  Estimated body mass index is 49.98 kg/m  as calculated from the following:    Height as of an earlier encounter on 4/20/21: 1.664 m (5' 5.5\").    Weight as of an earlier encounter on 4/20/21: 138.3 kg (305 lb).    Required weight loss goal pre-op: 5 lbs from initial consult weight in the clinic       4/20/2021   I have tried the following methods to lose weight Watching portions or calories, Exercise   Was in the surgical weight loss program at [facility], but got pregnant and had to stop the program. She has tried to cut back on portions and move more, but is struggling to be active now due to caring for her 4 kids.    No flowsheet data found.    SUPPLEMENT INFORMATION: (not sure of dosing)  1 multivitamin/ mineral with iron  1 tablet Zinc  1 tablet Vitamin B-12  1 tablet Iron  1 vitamin D      NUTRITION HISTORY:  Recall Diet Questions Reviewed With Patient 4/20/2021   Describe what you typically consume for breakfast (typical or most recent): Skips 3-4X per week or Eggs, toast, turkey kirkpatrick sometime eggs and rice   Describe what you typically consume for lunch (typical or most recent): Rice with some kind of stew or what ever I can find   Describe what you typically consume for supper (typical or most recent): Pasta or rice with stew   How many ounces of water, or other low " calorie drinks, do you drink daily (8 oz=1 glass)? 16 oz   How many ounces of caffeine (coffee, tea, pop) do you drink daily (8 oz=1 glass)? 16 oz-specialty coffee drinks some days   How many ounces of juice, pop, sweet tea, sports drinks, protein drinks, other sweetened drinks, do you drink daily (8 oz=1 glass)? 8 oz-occasional soda   How often do you drink alcohol? Monthly or less   If you do drink alcohol, how many drinks might you have in a day? (one drink = 5 oz. wine, 1 can/bottle of beer, 1 shot liquor) 1 or 2       Eating Habits 4/20/2021   Do you have any dietary restrictions? No   Do you currently binge eat (eat a large amount of food in a short time)? No   Are you an emotional eater? No   Do you get up to eat after falling asleep? No       ADDITIONAL INFORMATION:  Patient does the grocery shopping and cooking for her family of 6. Her spouse is from [place] and cooks mainly traditional foods, but she does not like many of them. She avoids most dairy products. During the Pandemic her family has been eating more fast foods and she is very concerned about this.  Currently she is afraid to go out side of her home due to the protests in [facility]. During the visit patient was very distracted by her kids. She was advised to see a RD in clinic next month.     Dining Out History Reviewed With Patient 4/20/2021   How often do you dine out? A couple of times a week.   Where do you dine out? (select all that apply) fast food chains   What types of food do you order when you dine out? Brody Turcios s most of the time sometime Crystal s once in a while chickPlay       Physical Activity Reviewed With Patient 4/20/2021   How often do you exercise? Less than 1 time per week   What is the duration of your exercise (in minutes)? 45 Minutes   What types of exercise do you do? gym membership   What keeps you from being more active?  Too tired       NUTRITION DIAGNOSIS:  Obesity r/t long history of self-monitoring  "deficit and excessive energy intake aeb BMI >30 kg/m2.    INTERVENTION:  Intervention Provided/Education Provided on post-op diet guidelines, vitamins/minerals essential post-operatively, GI anatomy of bariatric surgeries, ways to help prepare for post-op diet guidelines pre-operatively, portion/calorie-control.  Provided pt with list of goals RD contact information.      Questions Reviewed With Patient 4/20/2021   How ready are you to make changes regarding your weight? Number 1 = Not ready at all to make changes up to 10 = very ready. 10   How confident are you that you can change? 1 = Not confident that you will be successful making changes up to 10 = very confident. 10       Patient Understanding: fair-good  Expected Compliance: fair-good    GOALS:  Eat breakfast daily or have protein drink  Reduce fast food to 1X every other week  Start: 600 mg calcium citrate 2X per day (take at least 2 hours away from iron and multivitamin/ mineral for best absorption)/ please bring vitamin bottles in to clinic visit next month    Follow-Up:   Recommend 2-3 follow up visits to assist with lifestyle changes or per insurance.Face to face visit requested for next month (\"see additional information\" above)      Time spent with patient: 37 minutes.  Ad William RD, LD    "

## 2021-04-20 NOTE — Clinical Note
Dear Nasim,    You see Tiffany tomorrow to establish care. I had the pleasure of seeing Tiffany  in our bariatric clinic to evaluate her obesity. She is considering baraitric surgery and had an initial evaluation today. Here is her tasklist.  I will keep you abreast of her progress.    Sincerely,      Irina Courtney PA-C

## 2021-04-20 NOTE — PATIENT INSTRUCTIONS
Joni Allen-  Welcome to the [facility] [place]! It was great to visit with you and learn about your interest in weight loss surgery. Below are the goals we discussed.  GOALS:  Eat breakfast daily or have protein drink (see stage 2 full liquid diet for guidelines on selecting protein drinks)  Reduce fast food to 1X every other week  Start: 600 mg calcium citrate 2X per day (take at least 2 hours away from iron and multivitamin/ mineral for best absorption)/ please bring vitamin bottles in to clinic visit next month      Nutrition Educational Materials:    Diet Guidelines after Weight-loss Surgery  [email or web address]     Portion Sizes after Weight Loss Surgery  [email or web address]    Your Stage 1 Diet: Clear Liquids  [email or web address]     Your Stage 2 Diet: Low-fat Full Liquids  [email or web address]     Your Stage 3 Diet: Pureed Foods  [email or web address]     Pureed Pleasures  [email or web address]    Your Stage 4 Diet: Soft Foods  [email or web address]    Your Stage 5 Diet: Regular Foods  [email or web address]    Supplements after Weight Loss Surgery  [email or web address]     Tips for Weight Loss and Weight Management  [email or web address]    Create a Plate (How to Build A Healthy Meal)  [email or web address]    My Plate Planner_English - Pt Education  [email or web address]      Please call [phone] to schedule your next face to face visits in the clinic with a Dietitian/ Provider (PA or MD) in 1 month.  Thanks!  Ad William RD, LD  [facility] [place]

## 2021-04-20 NOTE — Clinical Note
Dear Nasim,    You see Tiffany tomorrow to establish care. I had the pleasure of seeing Tiffany  in our bariatric clinic to evaluate her obesity. She is considering baraitric surgery and had an initial evaluation today  I will keep you abreast of her progress.    Sincerely,      Irina Courtney PA-C

## 2021-04-20 NOTE — LETTER
Tiffany Sanz  April 20, 2021        Bariatric Task List      Required Weight loss:    Lose 5 lbs prior to surgery.  Goal Weight: 300 lbs  Tasks:  Have preoperative laboratory tests drawn.     Psychological Evaluation with MMPI and clearance for weight loss surgery.    Achieve clearance from dietitian to see surgeon.    See Primary Care provider regarding elevated blood pressure.    See Primary Care provider regarding iron deficiency anemia.  (ideally anemia resolved prior to surgery)

## 2021-05-19 ENCOUNTER — TELEPHONE (OUTPATIENT)
Dept: FAMILY MEDICINE | Facility: CLINIC | Age: 41
End: 2021-05-19

## 2021-05-19 NOTE — TELEPHONE ENCOUNTER
Patient Quality Outreach      Summary:    Patient has the following on her problem list/HM:   Hypertension   Last three blood pressure readings:  BP Readings from Last 3 Encounters:   03/22/21 (!) 148/87   10/08/20 (!) 158/97   03/04/20 137/88     Blood pressure: Failed    HTN Guidelines:  ? 139/89     Patient is due/failing the following:   Hypertension follow-up visit    Type of outreach:        Questions for provider review:    None                                                                                                                                          Chart routed to Care Team.

## 2021-05-19 NOTE — LETTER
May 27, 2021          Tiffany Sanz  [address]   [place] [address]            Dear Tiffany Sanz,      At [facility] we care about your health and are committed to providing quality patient care. Regular appointments are a vital part of the care and management of your health and can help prevent many of the complications that can occur.      It has come to our attention that you are due for a blood pressure check.  Please call [facility] at [phone] soon to schedule your follow up appointment.    If you have transferred care to another clinic please call to inform us so that we do not continue to send you reminder letters.      Sincerely,      [facility] Care Team

## 2021-05-22 ENCOUNTER — HEALTH MAINTENANCE LETTER (OUTPATIENT)
Age: 41
End: 2021-05-22

## 2021-05-31 VITALS — BODY MASS INDEX: 43.58 KG/M2 | HEIGHT: 66 IN | WEIGHT: 271.19 LBS

## 2021-05-31 VITALS — HEIGHT: 67 IN | BODY MASS INDEX: 42.85 KG/M2 | WEIGHT: 273 LBS

## 2021-05-31 VITALS — BODY MASS INDEX: 44.22 KG/M2 | HEIGHT: 66 IN | WEIGHT: 275.13 LBS

## 2021-05-31 VITALS — BODY MASS INDEX: 42.85 KG/M2 | WEIGHT: 273 LBS | HEIGHT: 67 IN

## 2021-05-31 VITALS — BODY MASS INDEX: 43.32 KG/M2 | HEIGHT: 67 IN | WEIGHT: 276 LBS

## 2021-05-31 VITALS — HEIGHT: 67 IN | WEIGHT: 278.4 LBS | BODY MASS INDEX: 43.7 KG/M2

## 2021-05-31 VITALS — WEIGHT: 261 LBS | HEIGHT: 66 IN | BODY MASS INDEX: 41.95 KG/M2

## 2021-06-01 VITALS — BODY MASS INDEX: 44.68 KG/M2 | HEIGHT: 66 IN | WEIGHT: 278 LBS

## 2021-06-03 ENCOUNTER — RECORDS - HEALTHEAST (OUTPATIENT)
Dept: ADMINISTRATIVE | Facility: CLINIC | Age: 41
End: 2021-06-03

## 2021-06-11 ENCOUNTER — ALLIED HEALTH/NURSE VISIT (OUTPATIENT)
Dept: NURSING | Facility: CLINIC | Age: 41
End: 2021-06-11
Payer: COMMERCIAL

## 2021-06-11 VITALS — OXYGEN SATURATION: 100 % | DIASTOLIC BLOOD PRESSURE: 84 MMHG | SYSTOLIC BLOOD PRESSURE: 135 MMHG | HEART RATE: 67 BPM

## 2021-06-11 DIAGNOSIS — Z01.30 BP CHECK: Primary | ICD-10-CM

## 2021-06-11 DIAGNOSIS — Z13.29 SCREENING FOR ENDOCRINE, NUTRITIONAL, METABOLIC AND IMMUNITY DISORDER: ICD-10-CM

## 2021-06-11 DIAGNOSIS — E78.5 HYPERLIPIDEMIA, UNSPECIFIED HYPERLIPIDEMIA TYPE: ICD-10-CM

## 2021-06-11 DIAGNOSIS — Z13.228 SCREENING FOR ENDOCRINE, NUTRITIONAL, METABOLIC AND IMMUNITY DISORDER: ICD-10-CM

## 2021-06-11 DIAGNOSIS — D50.0 IRON DEFICIENCY ANEMIA DUE TO CHRONIC BLOOD LOSS: ICD-10-CM

## 2021-06-11 DIAGNOSIS — E66.01 MORBID OBESITY (H): ICD-10-CM

## 2021-06-11 DIAGNOSIS — Z13.0 SCREENING FOR ENDOCRINE, NUTRITIONAL, METABOLIC AND IMMUNITY DISORDER: ICD-10-CM

## 2021-06-11 DIAGNOSIS — Z13.21 SCREENING FOR ENDOCRINE, NUTRITIONAL, METABOLIC AND IMMUNITY DISORDER: ICD-10-CM

## 2021-06-11 DIAGNOSIS — I10 ESSENTIAL HYPERTENSION: ICD-10-CM

## 2021-06-11 LAB
ALBUMIN SERPL-MCNC: 3.5 G/DL (ref 3.4–5)
ALP SERPL-CCNC: 70 U/L (ref 40–150)
ALT SERPL W P-5'-P-CCNC: 29 U/L (ref 0–50)
ANION GAP SERPL CALCULATED.3IONS-SCNC: 3 MMOL/L (ref 3–14)
AST SERPL W P-5'-P-CCNC: 19 U/L (ref 0–45)
BILIRUB SERPL-MCNC: 0.3 MG/DL (ref 0.2–1.3)
BUN SERPL-MCNC: 9 MG/DL (ref 7–30)
CALCIUM SERPL-MCNC: 8.7 MG/DL (ref 8.5–10.1)
CHLORIDE SERPL-SCNC: 107 MMOL/L (ref 94–109)
CO2 SERPL-SCNC: 29 MMOL/L (ref 20–32)
CREAT SERPL-MCNC: 0.8 MG/DL (ref 0.52–1.04)
ERYTHROCYTE [DISTWIDTH] IN BLOOD BY AUTOMATED COUNT: 17.4 % (ref 10–15)
FERRITIN SERPL-MCNC: 8 NG/ML (ref 12–150)
GFR SERPL CREATININE-BSD FRML MDRD: >90 ML/MIN/{1.73_M2}
GLUCOSE SERPL-MCNC: 93 MG/DL (ref 70–99)
HBA1C MFR BLD: 5.5 % (ref 0–5.6)
HCT VFR BLD AUTO: 30.4 % (ref 35–47)
HGB BLD-MCNC: 9.1 G/DL (ref 11.7–15.7)
MCH RBC QN AUTO: 23.8 PG (ref 26.5–33)
MCHC RBC AUTO-ENTMCNC: 29.9 G/DL (ref 31.5–36.5)
MCV RBC AUTO: 79 FL (ref 78–100)
PLATELET # BLD AUTO: 228 10E9/L (ref 150–450)
POTASSIUM SERPL-SCNC: 4 MMOL/L (ref 3.4–5.3)
PROT SERPL-MCNC: 7.6 G/DL (ref 6.8–8.8)
RBC # BLD AUTO: 3.83 10E12/L (ref 3.8–5.2)
SODIUM SERPL-SCNC: 139 MMOL/L (ref 133–144)
WBC # BLD AUTO: 5.6 10E9/L (ref 4–11)

## 2021-06-11 PROCEDURE — 80053 COMPREHEN METABOLIC PANEL: CPT | Performed by: PHYSICIAN ASSISTANT

## 2021-06-11 PROCEDURE — 82306 VITAMIN D 25 HYDROXY: CPT | Performed by: PHYSICIAN ASSISTANT

## 2021-06-11 PROCEDURE — 83036 HEMOGLOBIN GLYCOSYLATED A1C: CPT | Performed by: PHYSICIAN ASSISTANT

## 2021-06-11 PROCEDURE — 85027 COMPLETE CBC AUTOMATED: CPT | Performed by: PHYSICIAN ASSISTANT

## 2021-06-11 PROCEDURE — 82728 ASSAY OF FERRITIN: CPT | Performed by: PHYSICIAN ASSISTANT

## 2021-06-11 PROCEDURE — 36415 COLL VENOUS BLD VENIPUNCTURE: CPT | Performed by: PHYSICIAN ASSISTANT

## 2021-06-11 NOTE — PROGRESS NOTES
ASSESSMENT/ PLAN    1. Trochanteric bursitis of left hip  2. Hip pain, acute, left  Consistent with history and physical examination which shows nurse on the lateral hip over the trochanteric bursa with direct palpation.  Unlikely intrinsic reason for her hip pain.  I did not appreciate any back or knee pain.  Advised conservative therapy with ibuprofen and Tylenol alternating as needed as well as icing and heating pads.  We will also send her to physical therapy for muscular strengthening to help with the pain.  Return to clinic if not improving.  Can consider cortisone injection if not improving although due to her body habitus I am not sure if cortisone injection will be helpful.  - ibuprofen (ADVIL,MOTRIN) 600 MG tablet; Take 1 tablet (600 mg total) by mouth every 6 (six) hours as needed for pain.  Dispense: 60 tablet; Refill: 2  - acetaminophen (TYLENOL EXTRA STRENGTH) 500 MG tablet; Take 2 tablets (1,000 mg total) by mouth 3 (three) times a day as needed for pain.  Dispense: 100 tablet; Refill: 2  - Ambulatory referral to PT/OT    3. Obesity  BMI 44.26.  Will send her to the bariatric clinic for nonsurgical weight loss therapy.  - Ambulatory referral to Bariatric Care: Surgical and Non-Surgical    4. Amenorrhea  - Pregnancy, Urine      Josselyn Styles MD        SUBJECTIVE   Tiffany Sanz is a 37 y.o. old female with a past medical history of obesity who presented to clinic today for further evaluation of pain in her left hip. She feels that her hip is rubbing and pulling. Sometimes her outer hip feels hot to touch. Makes it difficult for her to walk, stretch out her left leg. Has been going for the last 3 weeks but worsened about 2 weeks ago. Pain is located on the outer hip and is described as tightness and feeling like someone is poking her. Denies numbness/tingling or weakness. She is not limping. Has been taking tylenol which helps her pain. She denies back pain. Pain will shoot down to her left  "toes/foot if she stretches out her left leg. She denies recent fall or injury. She does carry her 5 month old on her right hip however. Hasn't tried icing/heating pads. Hasn't had similar problems like this before. She's postpartum for 5 months.  She is also concerned about her weight and things that her weight contributes to her left hip pain.  She has been referred to the [facility] weight loss clinic however she not afford initial $150 payment for the weight loss seminar.  She is interested in other options such as nutrition services.     Review of Systems:  A 12 point comprehensive review of systems was negative except as noted in HPI.    Medical History  Active Ambulatory (Non-Hospital) Problems    Diagnosis     Vitamin D deficiency     Recurrent aphthous ulcer     Fatigue     Abdominal Pain     Menorrhagia     Anemia     Hyperlipidemia     Obesity     History reviewed. No pertinent past medical history.    Surgical History  She  has no past surgical history on file.    Social History  Reviewed, and she  reports that she has never smoked. She does not have any smokeless tobacco history on file. She reports that she drinks alcohol. She reports that she does not use illicit drugs.    Family History  Reviewed, and family history includes Diabetes in her paternal grandfather.    Medications  Reviewed and reconciled.      Allergies  Allergies   Allergen Reactions     Sulfa (Sulfonamide Antibiotics) Hives         OBJECTIVE  Physical Exam:  Vital signs: /72 (Patient Site: Right Arm, Patient Position: Sitting, Cuff Size: Adult Large)  Pulse 72  Resp 16  Ht 5' 6.5\" (1.689 m)  Wt (!) 278 lb 6.4 oz (126.3 kg)  LMP 06/05/2017 (Approximate)  Breastfeeding? No  BMI 44.26 kg/m2  Weight: (!) 278 lb 6.4 oz (126.3 kg)    General appearance: pleasant, appears stated age, cooperative and in no distress, morbidly obese  HEENT: Normocephalic atraumatic, moist mucous membrane.  Lymph: no " cervical/supraclavicular adenopathy  Respiratory: clear to auscultation bilaterally, good air movement throughout, no wheezing or crackles, speaking full sentences without difficulty  Cardiovascular: regular rate and rhythm, no murmur appreciated, no leg edema  Musculoskeletal: warm and well perfused, strength 5/5 and equal bilaterally, lateral left hip is tender to direct palpation of the left trochanteric bursa, hip range of motion is intact.  No midline spinal tenderness, no SI joint tenderness on palpation, no knee tenderness on palpation either.  Skin: no rashes  Neuro: alert oriented x 3, grossly normal otherwise  Psych: normal affect, appropriate conversation

## 2021-06-11 NOTE — PROGRESS NOTES
Assessment / Impression     1. Urinary frequency  Urinalysis-UC if Indicated    Basic Metabolic Panel    Culture, Urine    CANCELED: Culture, Urine   2. History of gestational diabetes  Glycosylated Hemoglobin A1c   3. Obesity  Ambulatory referral for Weight Management: [place]     The following high BMI interventions were performed this visit: encouragement to exercise and lifestyle education regarding diet    Plan:     I reviewed the urinalysis results with her which showed small leukocyte esterase, trace blood and protein.  I recommended that we check a urine culture before deciding whether or not to treat with antibiotics.  We are going to check a hemoglobin A1c given her history of gestational diabetes.  She will be notified of these results when they are available.  We discussed the importance of eating healthy and getting regular physical activity.  She was given a referral to the [place] clinic to help with weight management.    Subjective:      HPI: Tiffany Sanz is a 37 y.o. female who presents to the clinic to be evaluated for urinary frequency symptoms that she has had over the past couple of weeks.  She thinks this started before she had her ParaGard IUD removed on 6/7 for menorrhagia symptoms.  She reports not tolerating oral contraceptive pills in the past.  She is not currently sexually active.  She has a young baby who is only a few months old.  She denies dysuria, hematuria, vaginal discharge or odor.  She is interested in having diabetes testing done as well because her pregnancy was complicated by gestational diabetes.        Review of Systems  All other systems reviewed and are negative.     History   Smoking Status     Never Smoker   Smokeless Tobacco     Not on file       Family History   Problem Relation Age of Onset     Diabetes Paternal Grandfather        Objective:     /66 (Patient Site: Left Arm, Patient Position: Sitting, Cuff Size: Adult Large)  Pulse 64  Temp 98.6  F (37  " C) (Oral)   Resp 16  Ht 5' 6.5\" (1.689 m)  Wt (!) 273 lb (123.8 kg)  LMP 2017 (Approximate)  Breastfeeding? No  BMI 43.4 kg/m2  Physical Examination: General appearance - alert, well appearing, and in no distress  Eyes: pupils equal and reactive, extraocular eye movements intact  Mouth: mucous membranes moist, pharynx normal without lesions  Neck: supple, no significant adenopathy  Lungs: clear to auscultation, no wheezes, rales or rhonchi, symmetric air entry  Heart: normal rate, regular rhythm, normal S1, S2, no murmurs, rubs, clicks or gallops  Abdomen: soft, nontender, nondistended, no masses or organomegaly  Back: No CVA tenderness  Neurological: alert, oriented, normal speech, no focal findings or movement disorder noted.    Extremities: No edema, no clubbing or cyanosis  Psychiatric: Normal affect. Does not appear anxious or depressed.    Recent Results (from the past 168 hour(s))   Urinalysis-UC if Indicated   Result Value Ref Range    Color, UA Yellow Colorless, Yellow, Straw, Light Yellow    Clarity, UA Clear Clear    Glucose, UA Negative Negative    Bilirubin, UA Negative Negative    Ketones, UA Negative Negative    Specific Gravity, UA >=1.030 1.005 - 1.030    Blood, UA Trace (!) Negative    pH, UA 5.5 5.0 - 8.0    Protein, UA 30 mg/dL (!) Negative mg/dL    Urobilinogen, UA 0.2 E.U./dL 0.2 E.U./dL, 1.0 E.U./dL    Nitrite, UA Negative Negative    Leukocytes, UA Small (!) Negative    Bacteria, UA Moderate (!) None Seen hpf    RBC, UA 0-2 None Seen, 0-2 hpf    WBC, UA 5-10 (!) None Seen, 0-5 hpf    Squam Epithel, UA 25-50 (!) None Seen, 0-5 lpf    Mucus, UA Moderate (!) None Seen lpf   Glycosylated Hemoglobin A1c   Result Value Ref Range    Hemoglobin A1c 5.7 3.5 - 6.0 %       Current Outpatient Prescriptions   Medication Sig Note     condoms latex lubricated Ofe 1 each. 2017: Received from: [facility] Received Si Each as needed for intercourse     "

## 2021-06-11 NOTE — NURSING NOTE
I met with Tiffany Sanz at the request of Emma Pride to recheck her blood pressure.  Blood pressure medications on the med list were reviewed with patient.    Patient has taken all medications as per usual regimen: no - was only taking 100mg BID and not the 2 tabs BID. Misunderstood the directions.   Patient reports tolerating them without any issues or concerns: yes    There were no vitals filed for this visit.    Blood pressure was taken, previous encounter was reviewed, recorded blood pressure below 140/90. Patient was discharged and the note will be sent to the provider for final review.    Lori Fernandes, CMA

## 2021-06-11 NOTE — PROGRESS NOTES
Assessment/plan  1. Encounter for IUD removal  - Gynecologic Cytology (PAP Smear)  - HPV Cascade (PCR)  2. Atypical squamous cells of undetermined significance (ASCUS) on Papanicolaou smear of cervix  IUD removed.   Discussed that menorrhagia likely not due to IUD, patient prefers removal at this time.   No complications.   Repeat pap smear collected since patient willing.   Declines STD testing. Declines alternate birth control.  Encouraged to keep up with routine health maintenance.   Follow up if her menstrual cycle is still a concern.         Subjective  [age] year old female here requesting IUD removal. She notes paragard was placed after delivery of her baby several months ago. She notes heaving menstrual cycle since this was placed. She has always had a heavy menstrual cycle though feels like this is worsening. She feels like this started with IUD placement.   Would like to have this removed. She denies abnormal discharge. She notes her last pap smear after her baby was born. She is not aware of any abnormal pap smear. EHR reviewed. At [facility] in [place], she has history of ASCUS, negative HPV. She reports she was not told about this. She has never had abnormal pap smear this. Is otherwise well. Tolerating diet. No pelvic pain. Living in [place] again. Agrees to recheck her pap smear. She denies STD check since she she is not sexually active since her delivery.     History reviewed. No pertinent past medical history.  Patient Active Problem List   Diagnosis     Anemia     Hyperlipidemia     Obesity     Menorrhagia     Abdominal Pain     Recurrent aphthous ulcer     Fatigue     Vitamin D deficiency     History reviewed. No pertinent surgical history.   Family History   Problem Relation Age of Onset     Diabetes Paternal Grandfather      Social History     Social History     Marital status: Single     Spouse name: N/A     Number of children: N/A     Years of education: N/A     Occupational History      Not on file.     Social History Main Topics     Smoking status: Never Smoker     Smokeless tobacco: Not on file     Alcohol use Yes     Drug use: No     Sexual activity: Not Currently     Birth control/ protection: IUD     Other Topics Concern     Not on file     Social History Narrative     Current Outpatient Prescriptions on File Prior to Visit   Medication Sig Dispense Refill     cholecalciferol, vitamin D3, (VITAMIN D3) 2,000 unit Tab Take 1 tablet (2,000 Units total) by mouth daily. 90 tablet 3     fluconazole (DIFLUCAN) 150 MG tablet Take 1 pill once, may repeat in 1 week if symptoms persist. 2 tablet 0     norgestimate-ethinyl estradiol (SPRINTEC, 28,) 0.25-35 mg-mcg per tablet Take 1 tablet by mouth daily. 90 tablet 4     No current facility-administered medications on file prior to visit.      Objective  Vitals:    06/07/17 0938   BP: 138/84   Pulse: 80   Temp: 98.5  F (36.9  C)       General Appearance:  Alert, cooperative, no distress, appears stated age   Head:  Normocephalic, without obvious abnormality, atraumatic   Eyes:  PERRL, conjunctiva/corneas clear, EOM's intact   Throat: Lips, mucosa, and tongue normal   Neck: Supple, symmetrical, trachea midline, no adenopathy;  thyroid: not enlarged, symmetric, no tenderness/mass/nodules; no carotid bruit or JVD   Lungs:   Clear to auscultation bilaterally, respirations unlabored   Abdomen:   Soft, non-tender, bowel sounds active all four quadrants,  no masses, no organomegaly   Genitalia: Normally developed genitalia with no external lesions or eruptions.  Vagina and cervix show no lesions, inflammation, discharge or tenderness.  No cystocele.  Uterus normal size and position.  No adnexal mass or tenderness.IUD strings in place.    Extremities: Extremities normal, atraumatic, no cyanosis or edema         IUD Removal Procedure Note    This 37 y.o. female presents today for removal of her IUD, which was inserted 1 year ago. Tiffany reports she is  experiencing  increased bleeding and cramping during menstruation, with the IUD    Procedure Details   Urine pregnancy test was not done. I discussed with the patient the potential risks and benefits of IUD removal. The alternatives forms of birth control were discussed with the patient.  The patient understands the procedure, has had ample time to ask questions Verbal informed consent was obtained.      Patient was positioned and the the IUD strings were visualized.  The strings were grasped with forceps and the IUD was gently removed.    Condition:  Stable    Complications:  None. Patient tolerated procedure well.    Plan:  The patient was advised to call for any fever or for prolonged or severe pain or bleeding.

## 2021-06-12 LAB — DEPRECATED CALCIDIOL+CALCIFEROL SERPL-MC: 29 UG/L (ref 20–75)

## 2021-06-12 NOTE — PROGRESS NOTES
BARIATRIC CONSULTATION    Impression: Tiffany Sanz is a 37 y.o. year old female with  has a past medical history of Anemia; Dyslipidemia; Fatigue; gestational diabetes; Lower leg edema; Menorrhagia; Morbid obesity with BMI of 40.0-44.9, adult; Numbness and tingling of foot; Snoring; and Urinary incontinence.  Poor functional capacity and musculoskeletal disability in the setting of the abovementioned weight related co-morbidities. Her Body mass index is 43.4 kg/(m^2)..    Plan: sleep study, dietitian, labs, phentermine  We discussed [facility] Bariatric Basics including:  -eating 3 meals daily  -eating protein first  -eating slowly, chewing food well  -avoiding/limiting calorie containing beverages  -choosing wheat, not white with breads, crackers, pastas, steve, bagels, tortillas, rice  -limiting restaurant or cafeteria eating to twice a week or less    We discussed the importance of restorative sleep and stress management in maintaining a healthy weight.    We reviewed medications associated with weight gain.    We discussed insulin resistance and glycemic index as it relates to appetite and weight control.     We discussed the National Weight Control Registry healthy weight maintenance strategies and ways to optimize metabolism.  We discussed the importance of physical activity including cardiovascular and strength training in maintaining a healthier weight and explored viable options.    We discussed medications available for weight loss including Phentermine, Phendimetrazine, Topamax, Qsymia, Lorcaserin, Diethylproprion, Orlistat, Contrave, Saxenda, and Vyvanse. We discussed the risks and benefits of each. We discussed indications, contraindications, potential side effects, and estimated costs of each. Literature was offered.  60 minutes spent with patient. >50% in counseling.    Recommendations: labs, protect your sleep, walk as able. See your primary MD regarding stopping periods  Labs: ordered  Referrals:  "labs related to fatigue and paresthesias, dietitian consult, sleep consults      History Surrounding Consultation  Struggles with weight started at age [age] made her gain 100#  Her weight at age 17 was size 10  She has had several past supervised and unsupervised weight loss attempts  The most weight lost was: ?  Unfortunately there was not durable weight maintenance.  History of bulimia, anorexia, or binge eating disorder? no  If Present has eating disorder been in remission at least 3 years? NA  Night time eating? Yes with the baby and will eat sweets    Dietary History  Meals per day: -3  Snacks: grazes  Typical Snack: sweets, fast foods  Who does the grocery shopping? She does  Who does the cooking? She does  A typical meal includes: B: skips or breakfast sandwich and hashbrowns L: [employer] D: Polish Sausage and [place]  Regular Pop: rare  Juice: cranberry juice rare  Caffeine: coffee all day long-ice coffee from [employer]  Amount of restaurant eating per week: daily  Eating a the table with the TV off? no    Physical Activity Patterns  Current physical activity routine includes: walks -has gone to a gym and did cardio and abs    Limitations from being physically active on a regular basis includes: time and fatigue    She describes her general health as: fair    Past Medical History  HTN: yes  Dyslipidemia: yes  JAYME: she thinks so  Obesity Hypoventilation: NO  DM2: no DM1: no DX: no Most recent AIC: 5.7. Was 6.0 historically  Neuropathy: feet  Nephropathy: no  Retinopathy: no  IFG or \"pre-DM\": in the past and GDM  MI: no  CVA:no  CHF: no  Heart Valves: no  Previous cardiac testing includes: no  Cancers: no  Kidney Disease: no  DVT: no  PE: no  Colitis: no  Crohn's: no  IBS: no  PUD: no  Fatty Liver: no  Abnormal LFTs: no  Hepatitis: no  Asthma: no  Bronchitis: no  Pneumonia: no  Other Lung Problems: no  Back Pain:in the past  DDD: no  Gout: no  Fibromyalgia: no  USI: yes  Severe Headaches: no  Seizures: no If " so, last seizure: no  Pseudotumor: no  PCOS: no  Menstrual Irregularity: no  Menorrhagia: yes  Infertility: no  Thyroid problems: no  Thyroid medications: no  Glaucoma: no  HIV positive: NO  MRSA/VRE history: no  History of Blood transfusion: yes after her first pregnancy  Anemia: yes    Health Care Maintenance  Colonoscopy: NA  Mammogram: NA  Pap: UTD    Medications   Current Outpatient Prescriptions   Medication Sig Dispense Refill     acetaminophen (TYLENOL EXTRA STRENGTH) 500 MG tablet Take 2 tablets (1,000 mg total) by mouth 3 (three) times a day as needed for pain. 100 tablet 2     ibuprofen (ADVIL,MOTRIN) 600 MG tablet Take 1 tablet (600 mg total) by mouth every 6 (six) hours as needed for pain. 60 tablet 2     phentermine (ADIPEX-P) 37.5 mg tablet Take 1 tablet (37.5 mg total) by mouth daily before breakfast. Take 1/2 to 1 tablet in the morning. 90 tablet 0     prenatal vit,zahra 74-iron-folic 27 mg iron- 1 mg Tab Take 1 tablet by mouth daily. 90 tablet prn     No current facility-administered medications for this visit.      Allergies   Sulfa (sulfonamide antibiotics)  Past Surgical History  Past Surgical History:   Procedure Laterality Date      SECTION, CLASSIC       History of problems with anesthesia: no  History of Malignant Hyperthermia: NO    Gynecological History  Menarche: 9  Regular: yes  Currently: regular and heavy  Problems getting pregnant: no  MD Involvement: no If so, explanation/Diagnosis: NA  : 3  Para:   C-S: 1  Vaginal deliveries: 1  SAB:0  EAB: 1  Gestational DM: yes  Gestational HTN: yes  Preeclampsia: yes  Current Birth Control: none    Family History  family history includes Diabetes in her paternal grandfather; Drug abuse in her mother; Heart disease in her maternal aunt and paternal grandfather; Mental illness in her mother. She was adopted.    Social History  Status: single   Children: 2 sons one 5 yo, second 7 months  Work Status: FT has worked 2  "jobs      Addiction History  Smoking History:   Started smoking: NA Quit smoking: NA Total years of tobacco use: 0  Alcohol use: rare  Current or Past history of alcohol or substance abuse: No  Last used: NA  Chemical Dependency Treatment History: NA  Chemicals: None    Psychiatric History  Diagnoses: post partum depression  Treated by: no treatment  Psychiatric Hospitalizations: no  Suicide attempts: no  ECT: no  Panic attacks: no  History of Abuse: no    Palliative Medicine History  Involvement in a pain clinic: no    ROS  Sleep  Snoring: yes  PND: yes  Witnessed Apneas: no  [place]: 15  STOP BAN/8  General  Fatigue: yes  Sleep Quality:poor  HEENT  Visual changes: no  Gastrointestinal  Heartburn: no  Dysphagia: no  Cardiovascular  Murmur: no  Elevated BP: yes  Chest Pain with Exertion: no  Dyspnea with Exertion: yes  Palpitations: no  Lower Extremity Edema: yes  Syncope: no  Pulmonary  Shortness of breath at rest: no  Snoring: yes  PND: yes  Wheezing: no  CPAP use: no  Gastrointestinal  Trouble swallowing:no  Heartburn: no  HX UGI/EGD: no  Abdominal pain: no  Hematochezia: no  Urologic  Hesitancy: no  Urgency: yes  Genitourinary  ED: nA  Menorrhagia: yes  Dysmenorrhea: no  Neurologic  Severe headache:no  Paresthesias: feet  Psychiatric  Moods Stable: yes  Hallucinations: no  Rheumatologic  Myalgias: no  Arthralgias: not much  Endocrine  Polydipsia: no  Polyuria: no  Galactorrhea: yes  Heat intolerance: no  Hirsutism: no  Musculoskeletal  Joint pain;no  Falls: no  Use of cane, crutch or motorized scooter: no  Hematologic  Abnormal Bleeding or Clotting: bleeding after childbirth and heavy periods  Dermatologic  Skin Tags: yes  Striae: yes  [place]: no  Acne: no  Intertrigo: no  Lower Leg ulcers: no      Physical Exam  Height: 5' 6.5\" (1.689 m) (2017  9:47 AM)  Initial Weight: 273 lbs (2017  9:47 AM)  Weight: 273 lb (123.8 kg) (2017  9:47 AM)  Weight loss from initial: 0 (2017  9:47 AM)  % " "Weight loss: 0 % (8/31/2017  9:47 AM)  BMI (Calculated): 43.4 (8/31/2017  9:47 AM)  SpO2: 100 % (8/31/2017  9:47 AM)  Waist Circumference (In): 50 Inches (8/31/2017  9:47 AM)  Hip Circumference (In): 53 Inches (8/31/2017  9:47 AM)  Neck Circumference (In): 15 Inches (8/31/2017  9:47 AM)      General Appearance  No acute distress. Obesity: cenral  Alert: yes  Sleepy: no  HEENT  PERRLA, EOMI  Neck  Stout: 15.5\" No carotid bruits  Airway: 3+  Cardiovascular  Rhythm regular Rate Regular  Murmur: no  Pulmonary  [place] Score: 15  Lungs clear to ascultation  Abdomen  No rashes.   Post surgical Scars: C-S  Extremities:  Pitting edema: bilateral trace  Palpable distal pulses: 2+  Varicose veins: no  Neurologic  Tremors: no  Psychiatric  Thought Content Organized  Mood appears stable  Endocrine  Moon Facies: NO  Dorsal Thoracic Prominence: NO  Skin tags: yes  Acanthosis nigricans: yes  Dermatologic  Intertrigo: no    Total time with patient 60 minutes, >50% in counseling and coordination of care.        "

## 2021-06-13 NOTE — PROGRESS NOTES
Assessment/Plan:       1. Screen for STD (sexually transmitted disease)  Screening for STDs will be completed today.  Wet prep for trichomonas, chlamydia and gonorrhea vaginal swab, and blood work for HIV and syphilis.  I discussed safe sex practices and using barrier protection to avoid transfer of STDs.  - Wet Prep, Vaginal  - Chlamydia trachomatis & Neisseria gonorrhoeae, Amplified Detection  - HIV Antigen/Antibody Screening [place]  - Syphilis Screen, Cascade    2. Vaginal discharge  Vaginal discharge has been present for a couple weeks.  She reports that she started noticing it after a bath as well as after having unprotected sex.  We will check this further.  Wet prep was collected today.  - Wet Prep, Vaginal    3. Bacterial vaginosis  Wet prep is positive for bacterial vaginosis.  No signs of Trichomonas or yeast were present.  Patient was started on 500 mg tablet of Flagyl twice daily for 5 days.  I advised patient to follow-up if symptoms are not improving by the end of the treatment.  I discussed the adverse reaction with alcohol consumption with this medication and discouraged alcohol use while taking this medication.  In addition I discussed avoiding sexual intercourse until she is cleared from this infection.  - metroNIDAZOLE (FLAGYL) 500 MG tablet; Take 1 tablet (500 mg total) by mouth 2 (two) times a day for 5 days.  Dispense: 10 tablet; Refill: 0        Subjective:      Tiffany Sanz is a 37 y.o. female who presents for concerns for a possible STD. She has had nausea and some vaginal discharge. She has not had any vaginal itching. She recently started on phentramine about 1 month ago with the [facility]. The nausea will come and go on a daily basis. She is not sure if this is due to symptoms of a STD or due to her new medication. She reports her last sexual contact was with her ex about 1 month ago which whom she has an 8 month old son with. She has not had any other  "sexual contacts in the last month.    The following portions of the patient's history were reviewed and updated as appropriate: allergies, current medications and problem list.    Review of Systems   Pertinent items are noted in HPI.      Objective:      /78 (Patient Site: Left Arm, Patient Position: Sitting, Cuff Size: Adult Large)  Pulse 72  Resp 16  Ht 5' 5.5\" (1.664 m)  Wt (!) 261 lb (118.4 kg)  LMP 09/26/2017  BMI 42.77 kg/m2    General appearance: alert, appears stated age and cooperative  Abdomen: soft, non-tender; bowel sounds normal; no masses,  no organomegaly  Pelvic: External genitalia appears within normal limits.  No specific vaginal discharge was evaluated externally.  Blind internal swab was collected for a wet prep as well as chlamydia and gonorrhea.  Skin: Skin color, texture, turgor normal. No rashes or lesions  Neurologic: Grossly normal     Results for orders placed or performed in visit on 10/03/17   Wet Prep, Vaginal   Result Value Ref Range    Yeast Result No yeast seen No yeast seen    Trichomonas No Trichomonas seen No Trichomonas seen    Clue Cells, Wet Prep Clue cells seen (!) No Clue cells seen           "

## 2021-06-14 NOTE — PROGRESS NOTES
Assessment:     1. Well adult exam  Lipid Cascade   2. Urinary frequency  Urinalysis-UC if Indicated   3. Screen for STD (sexually transmitted disease)  Chlamydia trachomatis & Neisseria gonorrhoeae, Amplified Detection    HIV Antigen/Antibody Screening [place]    Syphilis Screen, Cascade   4. Vaginal discharge  Wet Prep, Vaginal   5. Nausea  Pregnancy (Beta-hCG, Qual), Urine   6. Hair loss  Testosterone, Total and Bioavailable    Dehydroepiandrosterone Sulfate(DHEA-S)    Ambulatory referral to Dermatology   7. Anemia     8. Prediabetes  Glycosylated Hemoglobin A1c   9. Dyslipidemia     10. Morbid obesity with BMI of 40.0-44.9, adult          The following high BMI interventions were performed this visit: encouragement to exercise and I recommended she schedule follow-up visit at the bariatric clinic.    Plan:      I recommended that she schedule follow-up appointment at the bariatric clinic which she is planning to do.  We checked a urinalysis and a wet prep in the office today which were both negative.  We are also going to be checking the above labs including a UPT, testosterone and DHEAS levels.  I recommended she restart the iron and zinc supplement.  She was given a referral to dermatology to further evaluate her hair loss symptoms.  If her symptoms continue despite normal lab results she will schedule follow-up appointment.     All questions answered.  Diagnosis explained in detail, including differential.  Discussed healthy lifestyle modifications.  Follow up as needed.     Subjective:      Tiffany Sanz is a 37 y.o. female who presents for an annual exam. The patient is sexually active. The patient participates in regular exercise: active at work. The patient reports that there is not domestic violence in her life.  She has a medical history significant for morbid obesity, dyslipidemia, anemia and prediabetes.  She denies concerns regarding hearing, vision, bowel movements, menses, sleep or mood.  She  continues to struggle with vaginal discharge and urinary frequency symptoms for nearly 2 months.  On 10/3 she was seen in our office and had a wet prep consistent with bacterial vaginitis.  She was treated with Flagyl which she reports did not help.  She denies dysuria or hematuria.  She denies being sexually active over the past several weeks.  She continues to notice some mild abdominal discomfort as well as low back discomfort, nausea without vomiting and increased hair loss.  She has a 9-month-old.  She has not been breast-feeding for 6 months.  She was seen once at the bariatric clinic in August.  She reports starting phentermine and lost some weight with that.  She decided to stop it because she was feeling nauseous and thought it may be due to that medication.  She has not followed up with them yet.  On 17 her hemoglobin was low at 10.8, ferritin 25, zinc low at 0.63.  On 17 her hemoglobin A1c was 5.7%.    Social history: Has a 9-month-old son.    Healthy Habits:   Regular Exercise: active at work  Sunscreen Use: No  Healthy Diet: No  Dental Visits Regularly: No  Seat Belt: Yes  Sexually active: Yes  Self Breast Exam Monthly:Yes  Hemoccults: No  Flex Sig: No  Colonoscopy: No  Lipid Profile: 04/15/14  Glucose Screen: 17  Prevention of Osteoporosis: No  Last Dexa: N/A  Guns at Home:  N/A      Immunization History   Administered Date(s) Administered     Influenza, inj, historic,unspecified 2013     Influenza, seasonal,quad inj 6-35 mos 12/15/2014     Tdap 2010, 2011     Immunization status: up to date and documented.    No exam data present    Gynecologic History  Patient's last menstrual period was 2017 (exact date).  Contraception: none  Last Pap: 17. Results were: normal  Last mammogram: NONE.     OB History    Para Term  AB Living   3 2   1 2   SAB TAB Ectopic Multiple Live Births             # Outcome Date GA Lbr Roque/2nd Weight Sex Delivery Anes PTL  Lv   3 AB            2 Para            1 Para                   Current Outpatient Prescriptions   Medication Sig Dispense Refill     acetaminophen (TYLENOL EXTRA STRENGTH) 500 MG tablet Take 2 tablets (1,000 mg total) by mouth 3 (three) times a day as needed for pain. 100 tablet 2     ibuprofen (ADVIL,MOTRIN) 600 MG tablet Take 1 tablet (600 mg total) by mouth every 6 (six) hours as needed for pain. 60 tablet 2     prenatal vit,zahra 74-iron-folic 27 mg iron- 1 mg Tab Take 1 tablet by mouth daily. 90 tablet prn     No current facility-administered medications for this visit.      Past Medical History:   Diagnosis Date     Anemia      Dyslipidemia      Fatigue      Hx gestational diabetes      Lower leg edema      Menorrhagia      Morbid obesity with BMI of 40.0-44.9, adult      Numbness and tingling of foot      Snoring      Urinary incontinence      Past Surgical History:   Procedure Laterality Date      SECTION, CLASSIC       Sulfa (sulfonamide antibiotics)  Family History   Problem Relation Age of Onset     Adopted: Yes     Diabetes Paternal Grandfather      Heart disease Paternal Grandfather      Heart disease Maternal Aunt      Drug abuse Mother      Mental illness Mother      Social History     Social History     Marital status: Single     Spouse name: N/A     Number of children: N/A     Years of education: N/A     Occupational History     Not on file.     Social History Main Topics     Smoking status: Never Smoker     Smokeless tobacco: Never Used     Alcohol use Yes      Comment: 0-3 but rarely drinks     Drug use: No     Sexual activity: Not Currently     Partners: Male     Birth control/ protection: None     Other Topics Concern     Not on file     Social History Narrative    Single. 2 sons. Marva and Salvador.    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    PCA.     G[place] up in [place]. Knows her father. Doesn't know her mother well.       Review of Systems  General:  Denies problem  Eyes:  "Denies problem  Ears/Nose/Throat: Denies problem  Cardiovascular: Denies problem  Respiratory:  Denies problem  Gastrointestinal:  Denies problem, Genitourinary: Denies problem  Musculoskeletal:  Denies problem  Skin: Denies problem  Neurologic: Denies problem  Psychiatric: Denies problem  Endocrine: Denies problem  Heme/Lymphatic: Denies problem   Allergic/Immunologic: Denies problem        Objective:         Vitals:    11/28/17 1025   BP: 130/76   Pulse: 84   Resp: 16   Temp: 98.3  F (36.8  C)   TempSrc: Oral   Weight: (!) 271 lb 3 oz (123 kg)   Height: 5' 6.3\" (1.684 m)     Body mass index is 43.38 kg/(m^2).    Physical Exam:  General Appearance: Alert, cooperative, no distress, appears stated age  Head: Normocephalic, without obvious abnormality, atraumatic  Eyes: PERRL, conjunctiva/corneas clear, EOM's intact  Ears: Normal TM's and external ear canals, both ears  Nose: Nares normal, septum midline,mucosa normal, no drainage  Throat: Lips, mucosa, and tongue normal; teeth and gums normal  Neck: Supple, symmetrical, trachea midline, no adenopathy;  thyroid: not enlarged, symmetric, no tenderness/mass/nodules  Back: Symmetric, no curvature, ROM normal, no CVA tenderness  Lungs: Clear to auscultation bilaterally, respirations unlabored  Breasts: No breast masses, tenderness, asymmetry, or nipple discharge.  Heart: Regular rate and rhythm, S1 and S2 normal, no murmur, rub, or gallop   Abdomen: Soft, non-tender, bowel sounds active all four quadrants,  no masses, no organomegaly  Pelvic: Normal-appearing external female genitalia.  Normal-appearing vagina and cervix with a mild amount of clear discharge.  No adnexal masses or cervical motion tenderness on bimanual exam  Extremities: Extremities normal, atraumatic, no cyanosis or edema  Skin: Skin color, texture, turgor normal, no rashes or lesions.  Female pattern hair loss present.  Lymph nodes: Cervical, supraclavicular, and axillary nodes normal  Neurologic: " Alert.  Normal speech.  No focal deficits.  Deep tendon reflexes normal bilaterally       Recent Results (from the past 168 hour(s))   Wet Prep, Vaginal   Result Value Ref Range    Yeast Result No yeast seen No yeast seen    Trichomonas No Trichomonas seen No Trichomonas seen    Clue Cells, Wet Prep No Clue cells seen No Clue cells seen   Urinalysis-UC if Indicated   Result Value Ref Range    Color, UA Yellow Colorless, Yellow, Straw, Light Yellow    Clarity, UA Clear Clear    Glucose, UA Negative Negative    Bilirubin, UA Negative Negative    Ketones, UA Negative Negative    Specific Gravity, UA 1.025 1.005 - 1.030    Blood, UA Negative Negative    pH, UA 6.5 5.0 - 8.0    Protein, UA Negative Negative mg/dL    Urobilinogen, UA 0.2 E.U./dL 0.2 E.U./dL, 1.0 E.U./dL    Nitrite, UA Negative Negative    Leukocytes, UA Negative Negative

## 2021-06-15 NOTE — PROGRESS NOTES
Assessment / Impression     1. Anal discharge  [facility] - Misc. Lab Test   2. Hair loss     3. Nausea     4. Morbid obesity with BMI of 40.0-44.9, adult     5. Anemia             Plan:     I recommended that we screen for gonorrhea/chlamydia via an anal swab which was performed in the clinic.  I recommended that she follow-up with dermatology as scheduled for her hair loss issues.  She should also let them know about the inner lip sores.  The underlying cause of these is unclear.  They do not appear consistent with aphthous ulcers or cold sores on exam.  She will continue the ferrous sulfate supplement for the anemia.  She will be notified of the lab result when it is available.    Subjective:      HPI: Tiffany Sanz is a 37 y.o. female who presents to the clinic for follow-up.  She reports that she has not been feeling right over the past several months.  She has been in several times for STD checks because she has noticed vaginal discharge symptoms.  She recently discovered that the discharge is coming from her anus rather than her vagina.  She admits to having anal intercourse and is wondering if she has an infection/STD.  The discharge from her anus has been clear.  She describes having discomfort and nausea with some chills.  She denies fevers.  She does not currently have abdominal pain symptoms.  She continues to struggle with hair loss and iron deficiency anemia.  She was recently seen by an internal medicine specialist to [facility] on 12/29.  At that visit her hemoglobin was 10.3, ferritin 12, TSH normal.  When I saw her at her physical exam on 11/28 her labs including testosterone levels and DHEAS were normal.  She was referred to dermatology by myself and by the provider through [facility].  She is planning to see a dermatologist through [facility] soon guarding the hair loss.  She also has some sores on her inner lip that come and go over the past several months.  They are not painful.  She  "denies blisters.  She is unaware of any specific triggers.        Review of Systems  All other systems reviewed and are negative.     History   Smoking Status     Never Smoker   Smokeless Tobacco     Never Used       Family History   Problem Relation Age of Onset     Adopted: Yes     Diabetes Paternal Grandfather      Heart disease Paternal Grandfather      Heart disease Maternal Aunt      Drug abuse Mother      Mental illness Mother        Objective:     /80 (Patient Site: Left Arm, Patient Position: Sitting, Cuff Size: Adult Large)  Pulse 80  Temp 98.4  F (36.9  C) (Oral)   Resp 16  Ht 5' 6.3\" (1.684 m)  Wt (!) 275 lb 2 oz (124.8 kg)  LMP 12/13/2017 (Approximate)  Breastfeeding? No  BMI 44.01 kg/m2  Physical Examination: General appearance - alert, well appearing, and in no distress  Eyes: pupils equal and reactive, extraocular eye movements intact  Mouth: mucous membranes moist, pharynx normal without lesions  Neck: supple, no significant adenopathy  Lungs: clear to auscultation, no wheezes, rales or rhonchi, symmetric air entry  Heart: normal rate, regular rhythm, normal S1, S2, no murmurs, rubs, clicks or gallops  Abdomen: soft, nontender, nondistended, no masses or organomegaly  Back: No CVA tenderness  Neurological: alert, oriented, normal speech, no focal findings or movement disorder noted.    Extremities: No edema, no clubbing or cyanosis  Psychiatric: Normal affect. Does not appear anxious or depressed.  Rectal: External anus appears normal.  No obvious discharge on exam.  No results found for this or any previous visit (from the past 168 hour(s)).    Current Outpatient Prescriptions   Medication Sig Note     acetaminophen (TYLENOL EXTRA STRENGTH) 500 MG tablet Take 2 tablets (1,000 mg total) by mouth 3 (three) times a day as needed for pain.      ferrous sulfate 325 (65 FE) MG EC tablet Take 325 mg by mouth daily. 1/2/2018: Received from: [facility] Received Sig: Take 1 Tab by mouth " daily with breakfast for 90 days.     ibuprofen (ADVIL,MOTRIN) 600 MG tablet Take 1 tablet (600 mg total) by mouth every 6 (six) hours as needed for pain.      prenatal vit,zahra 74-iron-folic 27 mg iron- 1 mg Tab Take 1 tablet by mouth daily.      triamcinolone (KENALOG) 0.1 % lotion Apply topically. 1/2/2018: Received from: [facility] Received Sig: Apply  topically three times a day. To patches on scalp

## 2021-06-15 NOTE — PROGRESS NOTES
"Bariatric Follow-up    37 y.o.  female BMI:Body mass index is 44.87 kg/(m^2).    Weight:   Wt Readings from Last 1 Encounters:   02/01/18 (!) 278 lb (126.1 kg)    pounds  Height: 5' 6\" (1.676 m) (2/1/2018 10:12 AM)  Initial Weight: 273 lbs (2/1/2018 10:12 AM)  Weight: 278 lb (126.1 kg) (2/1/2018 10:12 AM)  Weight loss from initial: -5 (2/1/2018 10:12 AM)  % Weight loss: -1.83 % (2/1/2018 10:12 AM)  BMI (Calculated): 44.9 (2/1/2018 10:12 AM)  SpO2: 100 % (2/1/2018 10:12 AM)  Waist Circumference (In): 50 Inches (8/31/2017  9:47 AM)  Hip Circumference (In): 53 Inches (8/31/2017  9:47 AM)  Neck Circumference (In): 15 Inches (8/31/2017  9:47 AM)    Comorbidities:  Patient Active Problem List   Diagnosis     Anemia     Hyperlipidemia     Recurrent aphthous ulcer     Vitamin D deficiency     Hx gestational diabetes     Dyslipidemia     Lower leg edema     Snoring     Morbid obesity with BMI of 40.0-44.9, adult       Interim: She has head pain with phentermine. She used the 90 tablets. She lost 12#. She did not see the dietitain. She has chronic iron deficiency and low zinc and has been having hair.   Has heavy periods. Tried copper T and OCPs in the past and copper T migrated. OCPs caused her periods to be heavier.  She tried Depot Provera and went from a size 10 to a size 14. She moved to [place]. She helped a relative that got hurt.     Plan: Fredi Myers and Akanksha consult for menorrhagia. Continue your PNV. Refill. Refill Iron. Miralax prn. Refill phentermine. No further refills unless working with dietitian.  -We reviewed her medications and whether associated with weight gain.    We discussed [facility] Bariatric Basics including:  -eating 3 meals daily  -eating protein first  -eating slowly, chewing food well  -avoiding/limiting calorie containing beverages  -choosing wheat, not white with breads, crackers, pastas, steve, bagels, tortillas, rice  -limiting restaurant or cafeteria eating to twice a week or " less  -We discussed the importance of restorative sleep and stress management in maintaining a healthy weight.  -We discussed insulin resistance and glycemic index as it relates to appetite and weight control  -We discussed the National Weight Control Registry healthy weight maintenance strategies and ways to optimize metabolism.  -We discussed the importance of physical activity including cardiovascular and strength training in maintaining a healthier weight and explored viable options.    Most recent labs:  Lab Results   Component Value Date    WBC 6.6 09/05/2017    HGB 10.8 (L) 09/05/2017    HCT 35.0 09/05/2017    MCV 83 09/05/2017     09/05/2017     Lab Results   Component Value Date    CHOL 217 (H) 11/28/2017     Lab Results   Component Value Date    HDL 53 11/28/2017     Lab Results   Component Value Date    LDLCALC 141 (H) 11/28/2017     Lab Results   Component Value Date    TRIG 116 11/28/2017     Lab Results   Component Value Date    ALT 22 09/05/2017    AST 20 09/05/2017    ALKPHOS 78 09/05/2017    BILITOT 0.2 09/05/2017     Lab Results   Component Value Date    HGBA1C 5.6 11/28/2017     Lab Results   Component Value Date    OFOCFFBW81 457 09/05/2017     Lab Results   Component Value Date    OGYJNPCS31RT 22.4 (L) 09/05/2017     Lab Results   Component Value Date    FERRITIN 25 09/05/2017     Lab Results   Component Value Date    PTH 61 09/05/2017     Lab Results   Component Value Date    59107 118 09/05/2017     No results found for: 7597  Lab Results   Component Value Date    TSH 0.90 09/05/2017       DIETARY HISTORY  Meals Per Day: tries for 3  Eating Protein First?: no  Food Diary: B:skips or breakfast sandwich, coffee, S: fruit and veggies L:skips D:spinach with chicken, rice white Minutes  Snacks Per Day: fruit, veggies, granola bars  Typical Snack: see above  Fluid Intake: carbonated water,   Portion Control: problematic  Calorie Containing Beverages: coffee with sugar, Italian macchiado or  "caramel at SA  Alcohol per week: none  Typical Protein Food Choices: eggs, cheese, chicken, sausages, black beans, chile, greek yogurt,   Choosing Whole Grains: no. Only with bread.   Grocery Shopping is done by: she does  Food Preparation is done by: she does  Meals at Restaurant/Cafeteria/Take Out Per Week: all at work, also [employer]le  Eating at the Table: yes  TV is Off During Meals: yes    Positive Changes Since Last Visit: trying to protect her sleep, smaller portions  Struggling With: stress, busy.     Knowledgeable in Reading Food Labels: no  Getting Adequate Protein: probably  Sleeping 7-8 hours/day no 9-4 or 10-4:30 or 5  Stress management music, calls her dad,     PHYSICAL ACTIVITY PATTERNS:  Cardiovascular: lives on 3rd floor. Tries to take stairs at work.  Strength Training: occasional exercises    REVIEW OF SYSTEMS  GENERAL/CONSTITUTIONAL:  Fatigue: yes  CARDIOVASCULAR:  Chest Pain with Exertion: no  PULMONARY:  Dyspnea on exertion: yes  CPAP Use: no  Tobacco Use: no  Asthma Controlled: NA  GASTROINTESTINAL:  GERD/Heartburn: no  Gallbladder:   UROLOGIC:  Urinary Symptoms: frequency  NEUROLOGIC:  Headaches: no  Paresthesias: no  PSYCHIATRIC:  Moods: OK  MUSCULOSKELETAL/RHEUMATOLOGIC  Arthralgias: knees,   Myalgias: OK  ENDOCRINE:  Monitoring Blood Sugars: no  Sugars Well Controlled: yes  DERMATOLOGIC:  Rashes: eczema    PHYSICAL EXAM:  Vitals: /80  Pulse 87  Resp 18  Ht 5' 6\" (1.676 m)  Wt (!) 278 lb (126.1 kg)  SpO2 100%  BMI 44.87 kg/m2  Height: 5' 6\" (1.676 m) (2/1/2018 10:12 AM)  Initial Weight: 273 lbs (2/1/2018 10:12 AM)  Weight: 278 lb (126.1 kg) (2/1/2018 10:12 AM)  Weight loss from initial: -5 (2/1/2018 10:12 AM)  % Weight loss: -1.83 % (2/1/2018 10:12 AM)  BMI (Calculated): 44.9 (2/1/2018 10:12 AM)  SpO2: 100 % (2/1/2018 10:12 AM)  Waist Circumference (In): 50 Inches (8/31/2017  9:47 AM)  Hip Circumference (In): 53 Inches (8/31/2017  9:47 AM)  Neck Circumference (In): 15 Inches " (8/31/2017  9:47 AM)    GEN: Pleasant, well groomed, in no acute disress  EYES: EOMI,  ENT: airway patent  NECK: no carotid bruits, no anterior/supraclavicular lymphadenopathy, thyroid normal   HEART: Rhythm regular, rate regular, no murmur   LUNGS: Clear  ABDOMEN: soft, non-tender, obese, no rashes   VASCULAR: trace bilateral  lower extremity edema  MUSCULOSKELETAL:  muscle mass OK for age  SKIN:  no color changes of venous stasis, no ulcerations    Time spent with patients 30 minutes, >50% in counseling and coordination of care.

## 2021-07-15 ENCOUNTER — OFFICE VISIT (OUTPATIENT)
Dept: SURGERY | Facility: CLINIC | Age: 41
End: 2021-07-15
Payer: COMMERCIAL

## 2021-07-15 VITALS — BODY MASS INDEX: 50.31 KG/M2 | WEIGHT: 293 LBS

## 2021-07-15 DIAGNOSIS — E66.01 MORBID OBESITY (H): ICD-10-CM

## 2021-07-15 PROCEDURE — 97803 MED NUTRITION INDIV SUBSEQ: CPT | Performed by: DIETITIAN, REGISTERED

## 2021-07-15 NOTE — PROGRESS NOTES
"PRE SURGICAL WEIGHT LOSS NUTRITION APPOINTMENT    Tiffany Sanz  1980  female  [id number]  41 year old    ASSESSMENT    Desired Surgical Procedure: undecided     REASON FOR VISIT:  Tiffany Sanz is a 41 year old year old female presents today for a pre-surgical weight loss follow-up appointment. Patient accompanied by self.    DIAGNOSIS:  Weight Status Obesity Grade III BMI >40    ANTHROPOMETRICS:  Height: 5'5.5\"  Initial Weight: 305 lbs      Current weight: 307 lbs   BMI: 50.3 kg/(m^2).    VITAMINS AND MINERALS:  1 multivitamin/ mineral with iron  1 tablet Zinc  1 tablet Vitamin B-12  1 tablet Iron   1 vitamin D    NUTRITION HISTORY:  Breakfast: protein shake -Atkins shake breakfast sandwich, hash browns and drink or eggs-nausea (9:00-10:00)  Lunch: stew and starchy (rice)  Supper: using smaller plate; stew or pasta with ground beef; mixed vegetables or broccoli   Snacks: apple and orange   Fluids Consumed: Water, Crystal Light and Protein Drink, Arnold Bhandari   Eating slower: No  Chewing foods thoroughly: No  Take 20-30 minutes to consume each meal: No  Fluids and meals separate by at least 30 minutes: No  Carbonation: no   Caffeine: yes-iced coffee drinks   Additional Information: Patient was unsure if wanted to proceed with surgery as others around her were not being supportive.  Patient feels surgery will improve her health.  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  Patient has not been back to work since the birth of son.  Patient has found it challenging to be home with kids during COVID-19.  Patient has 3 sons (10,4, 1,) and 1 step son (8).  Patient prefers in person appointments.     4/2021 Patient does the grocery shopping and cooking for her family of 6. Her spouse is from [place] and cooks mainly traditional foods, but she does not like many of them. She avoids most dairy products. During the pandemic her family has been eating more fast " foods and she is very concerned about this.      PHYSICAL ACTIVITY:  Type: no structured exercise as unable to go to gym which she would prefer    DIAGNOSIS:  Previous Nutrition Diagnosis: Obesity related to long history of self- monitoring deficit and excessive energy intake evidenced by BMI of 49.98 kg/m2  No change, modified below    Previous goals:   Eat breakfast daily or have protein drink-met   Reduce fast food to 1X every other week-not met   Start: 600 mg calcium citrate 2X per day (take at least 2 hours away from iron and multivitamin/ mineral for best absorption)/ please bring vitamin bottles in to clinic visit next month-not met     Current Nutrition Diagnosis: Obesity related to long history of self-monitoring deficit and excessive energy intake as evidenced by BMI of 50.3 kg/m2.    INTERVENTION:  Nutrition Prescription: Recommended energy/nutrient modification.    GOALS:  Reduce Gomez's breakfast to 1 time per week  Exercise 3 times per week  Start 4199-1030 mg calcium with vitamin D     Intervention:  - Discussed progress towards previous goals.  - Reinforced importance of making behavior changes in preparation for bariatric surgery.   - Assessed learning needs and learning preferences       NUTRITION MONITORING AND EVALUATION:  Expected patient engagement:  good  Patient demonstrated good understanding.     Follow up: Continue to monitor patient closely regarding weight loss and diet.  # of visits needed: 2-3  Cleared by RD: No     TIME SPENT WITH PATIENT: 30 minutes    Martine Barajas, RD, [facility] Outpatient Dietitian/Weight Loss Clinic   [phone] (office phone)

## 2021-07-22 ENCOUNTER — OFFICE VISIT (OUTPATIENT)
Dept: OBGYN | Facility: CLINIC | Age: 41
End: 2021-07-22
Payer: COMMERCIAL

## 2021-07-22 VITALS
BODY MASS INDEX: 49.82 KG/M2 | WEIGHT: 293 LBS | SYSTOLIC BLOOD PRESSURE: 134 MMHG | OXYGEN SATURATION: 99 % | HEART RATE: 84 BPM | DIASTOLIC BLOOD PRESSURE: 84 MMHG

## 2021-07-22 DIAGNOSIS — N92.0 MENORRHAGIA WITH REGULAR CYCLE: Primary | ICD-10-CM

## 2021-07-22 DIAGNOSIS — D50.0 IRON DEFICIENCY ANEMIA DUE TO CHRONIC BLOOD LOSS: ICD-10-CM

## 2021-07-22 DIAGNOSIS — L98.9 SKIN LESION: ICD-10-CM

## 2021-07-22 PROCEDURE — 99214 OFFICE O/P EST MOD 30 MIN: CPT | Performed by: OBSTETRICS & GYNECOLOGY

## 2021-07-22 NOTE — PROGRESS NOTES
OB-GYN Problem-Oriented Visit or Consultation      Tiffany Sanz is a 41 year old year old P 3 who presents with a chief complaint of menorrhagia and check right breast skin lesion.  Referred by self.  Patient's last menstrual period was 06/24/2021 (approximate).    Assessment:   Encounter Diagnoses   Name Primary?     Menorrhagia with regular cycle Yes     Iron deficiency anemia due to chronic blood loss      Skin lesion          Plan and Recommendations: Suspect dysfunctional uterine bleeding. Fibroids may contribute also but were mainly subserosal.   I reviewed the condition, causes, differential diagnosis, prognosis, evaluation and management considerations and options.  Questions answered and information given. See orders.   Bariatric and Derm follow-up.   Consider Mirena IUD insertion in OR with preliminary hysteroscopy, dilatation and curettage too.   Follow-up labs in 2 weeks to check response to oral iron. Consider IV iron again if not improving.   I discussed the operation, indications, goals, general and specific risks, complications, alternatives and anticipated post-op course including success/failure rates, limitations and consequences.  Patient understands.  Instructions reviewed. Pre-anesthetic medical evaluation is to be arranged.  30 minutes spent on the date of encounter doing chart review, history, examination, discussion with patient, and documentation, and further activities as noted, and review of appropriateness of decision-making for care, and review of test results, and interpretation of test results, and review of outside records.      A/P:  Tiffany was seen today for breast problem and results.    Diagnoses and all orders for this visit:    Menorrhagia with regular cycle  -     Iron & Iron Binding Capacity; Future  -     CBC with platelets; Future  -     Ferritin; Future    Iron deficiency anemia due to chronic blood loss  -     Iron & Iron Binding Capacity; Future  -     CBC with  platelets; Future  -     Ferritin; Future    Skin lesion        Jules Barclay MD    HPI:     Interval history noted. Here with son.     Had Bariatric surgery evaluation and planning gastric sleeve but needs to lose some weight first and correct chronic anemia. Taking oral iron now. Menses q 28-30 days x 7-8 days with two heavy flow days. Advise against or not a candidate for OC, DepoP, endometrial ablation, or hysterectomy. Contraceptive method is TL. Past short term use of Mirena IUD in [place] but office placement was difficult and uncomfortable and it was removed a short time later.     Some focal hair loss and boils and seeing Derm.    Had I&D of a breast MRSA skin abscess in past and desires check on that site. No new lump. Mammogram was neg.     Past medical, obstetrical, surgical, family and social history reviewed and as noted or updated in chart.     Allergies, meds and supplements are as noted or updated in chart.      ROS:   Systems reviewed include:  constitutional, breast, genitourinary, integumentary, psychological, hematologic/lymphatic and endocrine.    These systems were negative for significant symptoms except for the following additional: none; see HPI.    EXAM:  VS as noted. /84 (BP Location: Left arm, Patient Position: Chair, Cuff Size: Adult Large)   Pulse 84   Wt 137.9 kg (304 lb)   LMP 06/24/2021 (Approximate)   SpO2 99%   Breastfeeding No   BMI 49.82 kg/m    Constitutionally normal.     Right breast neg except healed I&D site with some chronic thickened skin, otherwise neg.     Jules Barclay MD

## 2021-07-22 NOTE — PATIENT INSTRUCTIONS
If you have any questions regarding your visit, Please contact your care team.     [email or web address]: [phone]  Women s Health CLINIC HOURS TELEPHONE NUMBER       Jules Barclay M.D.    [place]-REA Wiley-REA Gonzales-Medical Assistant    Matilde-  Joanna-     Monday-[place]  8:00a.m-4:45 p.m  Tuesday-[place]  9:00a.m-4:00 p.m  Wednesday-[place] 8:00a.m-4:45 p.m.  Thursday-[place]  8:00a.m-4:45 p.m.  Friday-[place]  8:00a.m-4:45 p.m. [facility]  [address] [facility] [place] [id number] [phone] ask for [facility]  [phone] Fax  Imaging [phone]    [facility]  [address]  [place], [place] [address] [phone]    [facility]  [address] [place]  [place] [address] [phone] ask [facility]  [phone] Fax  Imaging [phone]     Urgent Care locations:    [place]        [place] Monday-Friday  5 pm - 9 pm  Saturday and Sunday   9 am - 5 pm  Monday-Friday   11 am - 9 pm  Saturday and Sunday   9 am - 5 pm   [phone]   If you need a medication refill, please contact your pharmacy. Please allow 3 business days for your refill to be completed.  As always, Thank you for trusting us with your healthcare needs!

## 2021-08-18 NOTE — PROGRESS NOTES
Bariatric Pre-Surgery Visit    CC: Obesity    HPI: I had the pleasure of seeing Tiffany in the office today to go over bariatric education.  I saw the patient in April to evaluate obesity and consider her for possible weight loss surgery. She has seen her OB/Gyn and is considering an IUD for heavy menses.  She may also get an iron infusion if her iron has not improved by next week.     Wt Readings from Last 4 Encounters:   08/19/21 302 lb (137 kg)   08/19/21 302 lb 8 oz (137.2 kg)   07/22/21 304 lb (137.9 kg)   07/15/21 307 lb (139.3 kg)      BARIATRIC METRICS:  Current Weight: 302 lb (137 kg)  Body mass index is 49.49 kg/m .   Wt change since last visit (lbs): -5  Cumulative weight loss (lbs): 3    Labs Reviewed:  Hemoglobin A1C   Date Value Ref Range Status   06/11/2021 5.5 0 - 5.6 % Final     Comment:     Normal <5.7% Prediabetes 5.7-6.4%  Diabetes 6.5% or higher - adopted from ADA   consensus guidelines.       Vitamin D Deficiency screening   Date Value Ref Range Status   06/11/2021 29 20 - 75 ug/L Final     Comment:     Season, race, dietary intake, and treatment affect the concentration of   25-hydroxy-Vitamin D. Values may decrease during winter months and increase   during summer months. Values 20-29 ug/L may indicate Vitamin D insufficiency   and values <20 ug/L may indicate Vitamin D deficiency.  Vitamin D determination is routinely performed by an immunoassay specific for   25 hydroxyvitamin D3.  If an individual is on vitamin D2 (ergocalciferol)   supplementation, please specify 25 OH vitamin D2 and D3 level determination by   LCMSMS test [id number].       TSH   Date Value Ref Range Status   02/26/2019 1.50 0.40 - 4.00 mU/L Final     Sodium   Date Value Ref Range Status   06/11/2021 139 133 - 144 mmol/L Final     Potassium   Date Value Ref Range Status   06/11/2021 4.0 3.4 - 5.3 mmol/L Final     Chloride   Date Value Ref Range Status   06/11/2021 107 94 - 109 mmol/L Final     Carbon Dioxide   Date Value Ref  Range Status   06/11/2021 29 20 - 32 mmol/L Final     Anion Gap   Date Value Ref Range Status   06/11/2021 3 3 - 14 mmol/L Final     Glucose   Date Value Ref Range Status   06/11/2021 93 70 - 99 mg/dL Final     Urea Nitrogen   Date Value Ref Range Status   06/11/2021 9 7 - 30 mg/dL Final     Creatinine   Date Value Ref Range Status   06/11/2021 0.80 0.52 - 1.04 mg/dL Final     GFR Estimate   Date Value Ref Range Status   06/11/2021 >90 >60 mL/min/[1.73_m2] Final     Comment:     Non  GFR Calc  Starting 12/18/2018, serum creatinine based estimated GFR (eGFR) will be   calculated using the Chronic Kidney Disease Epidemiology Collaboration   (CKD-EPI) equation.       Calcium   Date Value Ref Range Status   06/11/2021 8.7 8.5 - 10.1 mg/dL Final     Bilirubin Total   Date Value Ref Range Status   06/11/2021 0.3 0.2 - 1.3 mg/dL Final     Alkaline Phosphatase   Date Value Ref Range Status   06/11/2021 70 40 - 150 U/L Final     ALT   Date Value Ref Range Status   06/11/2021 29 0 - 50 U/L Final     AST   Date Value Ref Range Status   06/11/2021 19 0 - 45 U/L Final     Cholesterol   Date Value Ref Range Status   11/28/2017 217 (H) <=199 mg/dL Final     Direct Measure HDL   Date Value Ref Range Status   11/28/2017 53 >=50 mg/dL Final     LDL Cholesterol Calculated   Date Value Ref Range Status   11/28/2017 141 (H) <=129 mg/dL Final     LDL Cholesterol Direct   Date Value Ref Range Status   06/29/2012 153 (H) <130 mg/dL Final     Triglycerides   Date Value Ref Range Status   11/28/2017 116 <=149 mg/dL Final     WBC   Date Value Ref Range Status   06/11/2021 5.6 4.0 - 11.0 10e9/L Final     Hemoglobin   Date Value Ref Range Status   06/11/2021 9.1 (L) 11.7 - 15.7 g/dL Final     Hematocrit   Date Value Ref Range Status   06/11/2021 30.4 (L) 35.0 - 47.0 % Final     MCV   Date Value Ref Range Status   06/11/2021 79 78 - 100 fl Final     Platelet Count   Date Value Ref Range Status   06/11/2021 228 150 - 450 10e9/L  "Final     Comment:     Automated count confirmed.  Giant platelets are present.       Current Outpatient Medications   Medication     Cholecalciferol (VITAMIN D) 50 MCG (2000 UT) CAPS     Cyanocobalamin (B-12) 100 MCG TABS     Fe Bisgly-Succ-C-Thre-B12-FA (IRON-150 PO)     labetalol (NORMODYNE) 100 MG tablet     Multiple Vitamins-Minerals (MULTIVITAL PO)     Zinc Sulfate (ZINC 15 PO)     No current facility-administered medications for this visit.       PE:  /78   Pulse 63   Ht 5' 5.5\" (1.664 m)   Wt 302 lb (137 kg)   SpO2 98%   BMI 49.49 kg/m      GENERAL:  Good development and normal affect in no acute distress. Patient is alert and orientated x 3 and answers all questions appropriately.  HEENT: Head is normocephalic, nontraumatic. Pupils equal and round without conjunctival injection. Neck is supple without lymphadenopathy, thyroidmegaly, or mass. Trachea midline. Dentition WNL.   CARDIOVASCULAR:  Regular rate and rhythm without murmurs, rubs, or gallops.  RESPIRATORY: Lungs are clear to auscultation bilaterally with good breath sounds.  GASTROINTESTINAL: Abdomen is obese, nondistended, soft, nontender, without organomegaly or masses. No bruits.  LOWER EXTREMITIES: No LE edema bilaterally, no cyanosis, ulceration, or chronic venous stasis noted.  MUSCULOSKELETAL:  Moves all 4 extremities equal and strong. Has a normal gait.   NEUROLOGIC: Cranial nerves II-XII grossly intact.  SKIN: No intertriginous irritation or rash.     ASSESSMENT:    Morbid obesity (H)  Encounter for pre-bariatric surgery counseling and education*  Essential hypertension  Menorrhagia with regular cycle  Iron deficiency anemia due to chronic blood loss    Plan:  Reviewed tasklist  Lose 5 lbs prior to surgery.  Goal Weight: 300 lbs- pending    Have preoperative laboratory tests drawn.- completed     Psychological Evaluation with MMPI and clearance for weight loss surgery.- pending, will schedule today with Dr. Duke.    Achieve " clearance from dietitian to see surgeon.- pending    See Primary Care provider regarding elevated blood pressure.-Completed with Ob/Gyn.  improved today.     See Primary Care provider regarding iron deficiency anemia. - seeing OB/GYN for management.    Today in the office we discussed gastric sleeve surgery. Preoperative, perioperative, and postoperative processes, management, and follow up were addressed.  Risks and benefits were outlined including the risk of death, PE, DVT, ulcer, worsening GERD, N/V, stricture, hernia, wound infection, weight regain, and vitamin deficiencies. I emphasized exercise and activity along with appropriate food choice as the main foundation for weight loss with surgery providing surgical reinforcement of this.  A goal sheet and support group handout were given to the patient. Patient contract was signed.      Once the patient has completed their task list and there are no further recommendations, they will see the surgeon for consultation for bariatric surgery.  All questions were answered. Patient verbalizes understanding of the process to surgery and expectations for the postoperative period including the need for lifelong lifestyle changes, vitamin supplementation, and laboratory monitoring.        FOLLOW-UP:  Return to clinic in 1 month with diet      28  minutes spent on the date of the encounter reviewing chart, labs, patient education, charting, and plan of care.

## 2021-08-19 ENCOUNTER — OFFICE VISIT (OUTPATIENT)
Dept: SURGERY | Facility: CLINIC | Age: 41
End: 2021-08-19
Payer: COMMERCIAL

## 2021-08-19 VITALS
BODY MASS INDEX: 47.09 KG/M2 | OXYGEN SATURATION: 98 % | WEIGHT: 293 LBS | HEIGHT: 66 IN | DIASTOLIC BLOOD PRESSURE: 78 MMHG | SYSTOLIC BLOOD PRESSURE: 133 MMHG | HEART RATE: 63 BPM

## 2021-08-19 VITALS — WEIGHT: 293 LBS | BODY MASS INDEX: 49.57 KG/M2

## 2021-08-19 DIAGNOSIS — N92.0 MENORRHAGIA WITH REGULAR CYCLE: ICD-10-CM

## 2021-08-19 DIAGNOSIS — E66.01 MORBID OBESITY (H): ICD-10-CM

## 2021-08-19 DIAGNOSIS — E66.01 MORBID OBESITY (H): Primary | ICD-10-CM

## 2021-08-19 DIAGNOSIS — D50.0 IRON DEFICIENCY ANEMIA DUE TO CHRONIC BLOOD LOSS: ICD-10-CM

## 2021-08-19 DIAGNOSIS — I10 ESSENTIAL HYPERTENSION: ICD-10-CM

## 2021-08-19 DIAGNOSIS — Z71.89 ENCOUNTER FOR PRE-BARIATRIC SURGERY COUNSELING AND EDUCATION: ICD-10-CM

## 2021-08-19 PROCEDURE — 97803 MED NUTRITION INDIV SUBSEQ: CPT | Performed by: DIETITIAN, REGISTERED

## 2021-08-19 PROCEDURE — 99213 OFFICE O/P EST LOW 20 MIN: CPT | Performed by: PHYSICIAN ASSISTANT

## 2021-08-19 ASSESSMENT — MIFFLIN-ST. JEOR: SCORE: 2038.67

## 2021-08-19 NOTE — PROGRESS NOTES
"PRE SURGICAL WEIGHT LOSS NUTRITION APPOINTMENT    Tiffany Sanz  1980  female  [id number]  41 year old    ASSESSMENT    Desired Surgical Procedure: undecided     REASON FOR VISIT:  Tiffany Sanz is a 41 year old year old female presents today for a pre-surgical weight loss follow-up appointment. Patient accompanied by young son.    DIAGNOSIS:  Weight Status Obesity Grade III BMI >40    ANTHROPOMETRICS:  Height: 5'5.5\"  Initial Weight: 305 lbs      Previous weight: 307 lbs   Current weight: 302.5 lbs   BMI: 49.4 kg/(m^2).     VITAMINS AND MINERALS:  1 multivitamin/ mineral with iron  1 tablet Zinc  1 tablet Vitamin B-12  1 tablet Iron   1 vitamin D    NUTRITION HISTORY:  Breakfast: may skip; Atkins shake; avocado toast   Lunch: pasta or rice and stew -adding more vegetables   Supper: trying to limit fast food; chose [employer] over [employer]   Snacks: fruit  Fluids Consumed: Water, Protein Drink and iced coffee; tea   Eating slower: No  Chewing foods thoroughly: No  Take 20-30 minutes to consume each meal: No  Fluids and meals separate by at least 30 minutes: No  Carbonation: none  Caffeine: yes  Additional Information: Patient has been reducing fast food intake in the past month.  Patient has not been able to exercise due to lack of time with kids.  Patient states her legs hurt with her current weight as disappointed with the weight she has gained during the past year.     PHYSICAL ACTIVITY:  Type: walking (no intentional exercise)  Patient is unable to go the gym as planned.     DIAGNOSIS:  Previous Nutrition Diagnosis: Obesity related to long history of self- monitoring deficit and excessive energy intake evidenced by BMI of 50.3 kg/m2  No change, modified below    Previous goals:   Reduce Gomez's breakfast to 1 time per week-met  Exercise 3 times per week-not met   Start 5593-1853 mg calcium with vitamin D-not met (tried but got ill after taking)     Current Nutrition Diagnosis: Obesity related to long " history of self-monitoring deficit and excessive energy intake as evidenced by BMI of 49.4 kg/m2.    INTERVENTION:  Nutrition Prescription: Recommended energy/nutrient modification.    GOALS:  Exercise 2 times per week  Avoid liquids 30 minutes before meals  Take calcium 2 hours apart from iron and multivitamin     Intervention:  - Discussed progress towards previous goals.  - Reinforced importance of making behavior changes in preparation for bariatric surgery.   - Assessed learning needs and learning preferences  - Congratulated patient on weight loss    NUTRITION MONITORING AND EVALUATION:  Expected patient engagement: good   Patient demonstrated good understanding.     Follow up: Continue to monitor patient closely regarding weight loss and diet.  # of visits needed: 1-2  Cleared by RD: No     TIME SPENT WITH PATIENT: 25 minutes    Martine Barajas, RD, LD  [facility] Outpatient Dietitian/Weight Loss Clinic   [phone] (office phone)

## 2021-08-25 DIAGNOSIS — I10 ESSENTIAL HYPERTENSION: ICD-10-CM

## 2021-08-26 RX ORDER — LABETALOL 100 MG/1
200 TABLET, FILM COATED ORAL 2 TIMES DAILY
Qty: 120 TABLET | Refills: 0 | Status: SHIPPED | OUTPATIENT
Start: 2021-08-26 | End: 2021-10-04

## 2021-08-26 NOTE — TELEPHONE ENCOUNTER
Pt last saw Dr. Barclay on 7/22/21.  I do not see hypertension was discussed at this appt.     Routing to Dr. Barclay to see if he wants to continue to manage pt's HTN or if she should f/u with an FP provider for further management.    Inez Ford, RN

## 2021-08-26 NOTE — TELEPHONE ENCOUNTER
I have refilled prescription for now but advise FP or IM follow-up in the next 1-2 months for continuing management.   Jules Barclay MD

## 2021-09-09 ENCOUNTER — VIRTUAL VISIT (OUTPATIENT)
Dept: SURGERY | Facility: CLINIC | Age: 41
End: 2021-09-09
Payer: COMMERCIAL

## 2021-09-09 DIAGNOSIS — E66.01 MORBID OBESITY (H): Primary | ICD-10-CM
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
## 2021-09-11 ENCOUNTER — HEALTH MAINTENANCE LETTER (OUTPATIENT)
Age: 41
End: 2021-09-11

## 2021-09-17 ENCOUNTER — TELEPHONE (OUTPATIENT)
Dept: SURGERY | Facility: CLINIC | Age: 41
End: 2021-09-17
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
## 2021-09-21 ENCOUNTER — LAB (OUTPATIENT)
Dept: LAB | Facility: CLINIC | Age: 41
End: 2021-09-21
Payer: COMMERCIAL

## 2021-09-21 ENCOUNTER — VIRTUAL VISIT (OUTPATIENT)
Dept: SURGERY | Facility: CLINIC | Age: 41
End: 2021-09-21
Payer: COMMERCIAL

## 2021-09-21 DIAGNOSIS — D50.0 IRON DEFICIENCY ANEMIA DUE TO CHRONIC BLOOD LOSS: ICD-10-CM

## 2021-09-21 DIAGNOSIS — N92.0 MENORRHAGIA WITH REGULAR CYCLE: ICD-10-CM

## 2021-09-21 DIAGNOSIS — E66.01 MORBID OBESITY (H): ICD-10-CM

## 2021-09-21 LAB
FERRITIN SERPL-MCNC: 21 NG/ML (ref 12–150)
IRON SATN MFR SERPL: 7 % (ref 15–46)
IRON SERPL-MCNC: 28 UG/DL (ref 35–180)
TIBC SERPL-MCNC: 396 UG/DL (ref 240–430)

## 2021-09-21 PROCEDURE — 85027 COMPLETE CBC AUTOMATED: CPT

## 2021-09-21 PROCEDURE — 36415 COLL VENOUS BLD VENIPUNCTURE: CPT

## 2021-09-21 PROCEDURE — 83550 IRON BINDING TEST: CPT

## 2021-09-21 PROCEDURE — 97803 MED NUTRITION INDIV SUBSEQ: CPT | Mod: 95 | Performed by: DIETITIAN, REGISTERED

## 2021-09-21 PROCEDURE — 82728 ASSAY OF FERRITIN: CPT

## 2021-09-21 NOTE — PROGRESS NOTES
"Tiffany is a 41 year old who is being evaluated via a billable video visit.      How would you like to obtain your AVS? MyChart  If the video visit is dropped, the invitation should be resent by: Text to cell phone: [phone]  Will anyone else be joining your video visit? No      Video Start Time: 10:11am       Video-Visit Details    Type of service:  Video Visit    Video End Time:10:25am    Originating Location (pt. Location): Home    Distant Location (provider location):  [facility] [place]     Platform used for Video Visit: [employer]     PRE SURGICAL WEIGHT LOSS NUTRITION APPOINTMENT    Tiffany Sanz  1980  female  [id number]  41 year old    ASSESSMENT    Desired Surgical Procedure: undecided    REASON FOR VISIT:  Tiffany Sanz is a 41 year old year old female presents today for a pre-surgical weight loss follow-up appointment. Patient accompanied by young son.     DIAGNOSIS:  Weight Status Obesity Grade III BMI >40    ANTHROPOMETRICS:  Height: 65.5\"    Initial Weight: 305 lbs     Weight last visit: 302.5 lbs    Current weight: n/a      VITAMINS AND MINERALS:  1 Multivitamin with Minerals (AM)  (pt unsure of dose) mg Calcium with Vitamin D (AM)  (pt unsure of dose) International units Vitamin D  Iron (evening)  Vitamin C      NUTRITION HISTORY:  Breakfast: celery juice or rice + chicken   Lunch: sandwich + chips +/- vegetables   Supper: chicken + rice +/- vegetabales  Snacks: fruit   Fluids Consumed: Water (48-64oz), juice (4-8oz), coffee (1x/week; reducing)  Eating slower: Yes  Chewing foods thoroughly: pt unsure  Take 20-30 minutes to consume each meal: Yes  Fluids and meals separate by at least 30 minutes: Sometimes  Carbonation: none  Caffeine: reducing  Additional Information: Pt occasionally struggles to eat breakfast (doesn't feel hungry). Reviewed vitamin/mineral needs. Reviewed expectations for behavior change in anticipation of dietary clearance.     PHYSICAL " ACTIVITY:  None; trying to be more active throughout the day     DIAGNOSIS:  Previous Nutrition Diagnosis: Obesity related to long history of self- monitoring deficit and excessive energy intake evidenced by BMI of 49.4 kg/m2  No change, modified below    Previous goals:   Exercise 2 times per week - not met  Avoid liquids 30 minutes before meals - improving  Take calcium 2 hours apart from iron and multivitamin - not met    Current Nutrition Diagnosis: Obesity related to long history of self-monitoring deficit and excessive energy intake as evidenced by previous BMI of >40 kg/m2.    INTERVENTION:  Nutrition Prescription: Recommended energy/nutrient modification.    GOALS:  Take all pre-op vitamins/minerals per guidelines  Consistently separate fluids and meals by 30 minutes  Chew all food to an applesauce consistency  Eliminate caffeine/carbonation  Obtain updated weight      Intervention:  - Discussed progress towards previous goals.  - Reinforced importance of making behavior changes in preparation for bariatric surgery.   - Assessed learning needs and learning preferences       NUTRITION MONITORING AND EVALUATION:  Anticipated compliance: fair-good  Patient demonstrated fair-good understanding.     Follow up: Continue to monitor patient closely regarding weight loss and diet.  # of visits needed: 1-2  Cleared by RD: No     TIME SPENT WITH PATIENT: 14 minutes      Carmella Negro RD, LD  Clinical Dietitian

## 2021-09-21 NOTE — PATIENT INSTRUCTIONS
Joni Allen!    It was great meeting you this morning! Here's the goals we discussed:    1. Take your vitamins/minerals as follows:  Morning: Multivitamin, Vitamin D and Iron  Afternoon: 600mg Calcium  Eveninmg Calcium  (If your calcium is only 500mg, take a 3rd dose at bedtime)    2. Consistently separate fluids and meals by 30 minutes    3. Take 20-30 minutes to eat meals and chew all food to an applesauce consistency    4. Avoid caffeine/carbonation    5. Obtain an updated weight        Plan on following up in 1 month. This can be scheduled via our call center at . Reach out sooner with any questions/concerns. Have a great week!      Carmella Negro RD, LD  Clinical Dietitian

## 2021-09-22 LAB
ERYTHROCYTE [DISTWIDTH] IN BLOOD BY AUTOMATED COUNT: 19.9 % (ref 10–15)
HCT VFR BLD AUTO: 35.7 % (ref 35–47)
HGB BLD-MCNC: 10.9 G/DL (ref 11.7–15.7)
MCH RBC QN AUTO: 24.9 PG (ref 26.5–33)
MCHC RBC AUTO-ENTMCNC: 30.5 G/DL (ref 31.5–36.5)
MCV RBC AUTO: 82 FL (ref 78–100)
PLATELET # BLD AUTO: 167 10E3/UL (ref 150–450)
RBC # BLD AUTO: 4.38 10E6/UL (ref 3.8–5.2)
WBC # BLD AUTO: 7.2 10E3/UL (ref 4–11)

## 2021-09-23 ENCOUNTER — VIRTUAL VISIT (OUTPATIENT)
Dept: SURGERY | Facility: CLINIC | Age: 41
End: 2021-09-23
Payer: COMMERCIAL

## 2021-09-23 DIAGNOSIS — E66.01 MORBID OBESITY (H): Primary | ICD-10-CM

## 2021-09-23 NOTE — LETTER
9/23/2021         RE: Tiffany Sanz  [address]  [facility] [address]        Dear Colleague,    Thank you for referring your patient, Tiffany Sanz, to the [facility] SURGERY CLINIC AND BARIATRICS CARE [place]. Please see a copy of my visit note below.    Video-Visit Details    Type of service:  Video Visit    Video Start Time (time video started): 1:29 PM    Video End Time (time video stopped): 2:07 PM    Originating Location (pt. Location): Home    Distant Location (provider location):  Home office    Mode of Communication:  Video Conference via [facility]    Physician has received verbal consent for a Video Visit from the patient? Yes    Tiffany has made minor changes in eating and lifestyle, but still needs to be more mindful about her eating. She will follow up with a list of activities and mindful eating strategies. She will also need to complete all of the psychological testing. Dx: F43.9; E66.01    Davis Duke, PhD            Again, thank you for allowing me to participate in the care of your patient.        Sincerely,        Davis Duke, PhD

## 2021-09-23 NOTE — PROGRESS NOTES
Video-Visit Details    Type of service:  Video Visit    Video Start Time (time video started): 1:29 PM    Video End Time (time video stopped): 2:07 PM    Originating Location (pt. Location): Home    Distant Location (provider location):  Home office    Mode of Communication:  Video Conference via [facility]    Physician has received verbal consent for a Video Visit from the patient? Yes    Tiffany has made minor changes in eating and lifestyle, but still needs to be more mindful about her eating. She will follow up with a list of activities and mindful eating strategies. She will also need to complete all of the psychological testing. Dx: F43.9; E66.01    Davis Duke, PhD

## 2021-10-01 ENCOUNTER — MYC REFILL (OUTPATIENT)
Dept: OBGYN | Facility: CLINIC | Age: 41
End: 2021-10-01

## 2021-10-01 DIAGNOSIS — I10 ESSENTIAL HYPERTENSION: ICD-10-CM

## 2021-10-01 RX ORDER — LABETALOL 100 MG/1
200 TABLET, FILM COATED ORAL 2 TIMES DAILY
Qty: 120 TABLET | Refills: 0 | Status: CANCELLED | OUTPATIENT
Start: 2021-10-01

## 2021-10-04 RX ORDER — LABETALOL 100 MG/1
200 TABLET, FILM COATED ORAL 2 TIMES DAILY
Qty: 7 TABLET | Refills: 0 | Status: SHIPPED | OUTPATIENT
Start: 2021-10-04 | End: 2021-10-20

## 2021-10-04 NOTE — TELEPHONE ENCOUNTER
"Per 8/25/21 refill encounter:     \"I have refilled prescription for now but advise FP or IM follow-up in the next 1-2 months for continuing management.   Jules Barclay MD\"        Spoke with pt and notified her that Dr. Barclay would like her to f/u with an FP provider for continued management of HTN.    Pt verbalized understanding and agreed to plan.     Inez Ford RN    "

## 2021-10-04 NOTE — TELEPHONE ENCOUNTER
BP Readings from Last 6 Encounters:   08/19/21 133/78   07/22/21 134/84   06/11/21 135/84   03/22/21 (!) 148/87   10/08/20 (!) 158/97   03/04/20 137/88     Patient calling to reschedule her appointment as she couldn't come to today's visit. She has been out of medication since Sunday.  Patient reporting to start feeling that she has been not taking her meds today.    Patient rescheduled to be seen by Dr. Berrios tomorrow.    Patient placed on hold. After retrieved, phone disconnected.    Seven tablets of labetalol has been sent to the pharmacy.    Winosme Swain RN

## 2021-10-08 ENCOUNTER — OFFICE VISIT (OUTPATIENT)
Dept: URGENT CARE | Facility: URGENT CARE | Age: 41
End: 2021-10-08
Payer: COMMERCIAL

## 2021-10-08 ENCOUNTER — ALLIED HEALTH/NURSE VISIT (OUTPATIENT)
Dept: FAMILY MEDICINE | Facility: CLINIC | Age: 41
End: 2021-10-08
Payer: COMMERCIAL

## 2021-10-08 VITALS — SYSTOLIC BLOOD PRESSURE: 142 MMHG | HEART RATE: 64 BPM | DIASTOLIC BLOOD PRESSURE: 80 MMHG

## 2021-10-08 VITALS
SYSTOLIC BLOOD PRESSURE: 139 MMHG | TEMPERATURE: 98.2 F | WEIGHT: 293 LBS | HEART RATE: 78 BPM | OXYGEN SATURATION: 99 % | DIASTOLIC BLOOD PRESSURE: 81 MMHG | BODY MASS INDEX: 48.67 KG/M2

## 2021-10-08 DIAGNOSIS — Z01.30 BP CHECK: Primary | ICD-10-CM

## 2021-10-08 DIAGNOSIS — Z76.0 ENCOUNTER FOR MEDICATION REFILL: Primary | ICD-10-CM

## 2021-10-08 DIAGNOSIS — I10 HYPERTENSION, UNSPECIFIED TYPE: ICD-10-CM

## 2021-10-08 PROCEDURE — 99213 OFFICE O/P EST LOW 20 MIN: CPT | Performed by: PHYSICIAN ASSISTANT

## 2021-10-08 PROCEDURE — 99207 PR NO CHARGE NURSE ONLY: CPT

## 2021-10-08 RX ORDER — LABETALOL 100 MG/1
200 TABLET, FILM COATED ORAL 2 TIMES DAILY
Qty: 120 TABLET | Refills: 0 | Status: SHIPPED | OUTPATIENT
Start: 2021-10-08 | End: 2022-03-31

## 2021-10-08 NOTE — PROGRESS NOTES
Chief Complaint   Patient presents with     Hypertension     need medication for bp until 10/28/21            ASSESSMENT:    ICD-10-CM    1. Encounter for medication refill  Z76.0 labetalol (NORMODYNE) 100 MG tablet   2. Hypertension, unspecified type  I10 labetalol (NORMODYNE) 100 MG tablet       PLAN:Med refill as above. Normal kidney function/electrolytes 6/2021. KEEP F/U, NO FURTHER REFILLS WILL BE GIVEN THROUGH URGENT CARE.  See today's orders.  Follow-up with primary clinic if not improving.  Advised about symptoms which might herald more serious problems.        Emilia Domínguez PA-C         SUBJECTIVE:   41 year old female who presents for BP refill. Has appt 10/28. No HA or dizziness. No CP or SHORTNESS OF BREATH.               Allergies   Allergen Reactions     Sulfa Drugs Itching       Past Medical History:   Diagnosis Date     Chlamydia 2016     Essential hypertension 8/28/2020     Genital herpes 1999     History of transfusion of packed RBC 2017     Hyperlipidemia 3/21/2019    Overview:  Created by Conversion     Morbid obesity (H) 9/6/2018     Subserous leiomyoma of uterus 3/21/2019       Cholecalciferol (VITAMIN D) 50 MCG (2000 UT) CAPS,   Cyanocobalamin (B-12) 100 MCG TABS,   Fe Bisgly-Succ-C-Thre-B12-FA (IRON-150 PO),   labetalol (NORMODYNE) 100 MG tablet, Take 2 tablets (200 mg) by mouth 2 times daily  Multiple Vitamins-Minerals (MULTIVITAL PO),   Zinc Sulfate (ZINC 15 PO),     No current facility-administered medications on file prior to visit.      Social History     Tobacco Use     Smoking status: Never Smoker     Smokeless tobacco: Never Used   Substance Use Topics     Alcohol use: Not Currently     Drug use: No       ROS:  General: negative for fever  Neuro- no HA      Physcial Exam:  /81 (BP Location: Left arm, Patient Position: Sitting, Cuff Size: Adult Large)   Pulse 78   Temp 98.2  F (36.8  C) (Oral)   Wt 134.7 kg (297 lb)   SpO2 99%   BMI 48.67 kg/m      GENERAL: alert, no  acute distress  EYES: conjunctival clear  RESP: Regular breathing rate  NEURO: awake .      Emilia Domínguez PA-C

## 2021-10-08 NOTE — PROGRESS NOTES
SUBJECTIVE:  Tiffany Sanz is a 41 year old female who presents for a follow up evaluation of her hypertension.    The reason for the visit is:  Needs medication refilled. Missed recently scheduled blood pressure appointments with primary care. Has another appointment scheduled for 10/28/21.  RN in clinic today advises patient go to urgent care for refills until the appointment. Patient understands and agrees to this plan.       Patient is not taking medication as prescribed. Ran out of medication last night.  Patient is tolerating medications well.  Patient is not monitoring Blood Pressure at home.  Average readings if yes are NA    Current complaints: spots in vision      Current Outpatient Medications   Medication     Cholecalciferol (VITAMIN D) 50 MCG (2000 UT) CAPS     Cyanocobalamin (B-12) 100 MCG TABS     Fe Bisgly-Succ-C-Thre-B12-FA (IRON-150 PO)     labetalol (NORMODYNE) 100 MG tablet     Multiple Vitamins-Minerals (MULTIVITAL PO)     Zinc Sulfate (ZINC 15 PO)     No current facility-administered medications for this visit.       Allergies   Allergen Reactions     Sulfa Drugs Itching         OBJECTIVE:  Please get a blood pressure AND a pulse.  A height is also needed if has not been done in the past year.    BP (!) 142/80 (BP Location: Left arm, Patient Position: Sitting, Cuff Size: Adult Large)   Pulse 64     Vitals as recorded, a regular cuff was used.     ASSESSMENT:    Is the HYPERTENSION goal on the problem list? No  Patient Active Problem List   Diagnosis     CARDIOVASCULAR SCREENING; LDL GOAL LESS THAN 160     Morbid obesity (H)     Subserous leiomyoma of uterus     Hyperlipidemia     Genital herpes     Essential hypertension     Iron deficiency anemia due to chronic blood loss       Plan:  The patient's blood pressure is less than documented goal. The patient will be discharged home. CC: this note to the patient's primary provider.     The patient s blood pressure is higher than goal but is less  than 180 systolically AND less that 110 diastolically. The patient will be discharged home.  A telephone encounter will be created with this note and sent to the patient's primary provider for action.     The patient's blood pressure is above 180 systolically OR is above 110 diastolically. The primary provider, RN, or an available provider will be consulted for immediate action. Keep the patient here for appropriate urgent action.

## 2021-10-13 ENCOUNTER — VIRTUAL VISIT (OUTPATIENT)
Dept: SURGERY | Facility: CLINIC | Age: 41
End: 2021-10-13
Payer: COMMERCIAL

## 2021-10-13 DIAGNOSIS — E66.01 MORBID OBESITY (H): Primary | ICD-10-CM

## 2021-10-13 NOTE — PROGRESS NOTES
Video-Visit Details    Type of service:  Video Visit    Video Start Time (time video started): 12:15 PM    Video End Time (time video stopped): 12:38 PM    Originating Location (pt. Location): Home    Distant Location (provider location):  Home office    Mode of Communication:  Video Conference via [employer]    Physician has received verbal consent for a Video Visit from the patient? Yes    Tiffany still has not completed any of the psychological testing. She plans to do so before our next appointment in 2 weeks. She has made further changes in eating and appears to be more mindful about her eating. We will go over the questionnaires next appointment. Dx: F43.9; E66.01    Davis Duke, PhD

## 2021-10-13 NOTE — LETTER
10/13/2021         RE: Tiffany Sanz  [address]  [facility] [address]        Dear Colleague,    Thank you for referring your patient, Tiffany Sanz, to the [facility] SURGERY CLINIC AND BARIATRICS CARE [place]. Please see a copy of my visit note below.    Video-Visit Details    Type of service:  Video Visit    Video Start Time (time video started): 12:15 PM    Video End Time (time video stopped): 12:38 PM    Originating Location (pt. Location): Home    Distant Location (provider location):  Home office    Mode of Communication:  Video Conference via [employer]    Physician has received verbal consent for a Video Visit from the patient? Yes    Tiffany still has not completed any of the psychological testing. She plans to do so before our next appointment in 2 weeks. She has made further changes in eating and appears to be more mindful about her eating. We will go over the questionnaires next appointment. Dx: F43.9; E66.01    Davis Duke, PhD            Again, thank you for allowing me to participate in the care of your patient.        Sincerely,        Davis Duke, PhD

## 2021-10-19 NOTE — PROGRESS NOTES
{PROVIDER CHARTING PREFERENCE:129855}    Subjective   Tiffany is a 41 year old who presents for the following health issues {ACCOMPANIED BY STATEMENT (Optional):652765}    HPI     {SUPERLIST (Optional):649640}  {additonal problems for provider to add (Optional):251317}    Review of Systems   {ROS COMP (Optional):556320}      Objective    There were no vitals taken for this visit.  There is no height or weight on file to calculate BMI.  Physical Exam   {Exam List (Optional):588552}    {Diagnostic Test Results (Optional):474297}    {AMBULATORY ATTESTATION (Optional):168172}

## 2021-10-19 NOTE — PATIENT INSTRUCTIONS
At [facility], we strive to deliver an exceptional experience to you, every time we see you. If you receive a survey, please complete it as we do value your feedback.  If you have MyChart, you can expect to receive results automatically within 24 hours of their completion.  Your provider will send a note interpreting your results as well.   If you do not have MyChart, you should receive your results in about a week by mail.    Your care team:                            Family Medicine Internal Medicine   MD Yvan Sloan MD Shantel Branch-Fleming, MD Srinivasa Vaka, MD Katya Belousova, PABART Pride, APRN CNP    Solomon Cardona, MD Pediatrics   Davidson Bennett, PABART Garrett, CNP MD Laura Ordaz APRN CNP   MD Mey James MD Deborah Mielke, MD Humera Taylor, APRN [facility]      Clinic hours: Monday - Thursday 7 am-6 pm; Fridays 7 am-5 pm.   Urgent care: Monday - Friday 10 am- 8 pm; Saturday and Sunday 9 am-5 pm.    Clinic: [phone]       [facility]: Monday - Thursday 8 am - 7 pm; Friday 8 am - 6 pm  [facility]: [phone]     Use www.oncare.org for 24/7 diagnosis and treatment of dozens of conditions.    Patient Education     Folliculitis  Folliculitis is an infection of a hair follicle. A hair follicle is the little pocket where a hair grows out of the skin. Bacteria normally live on the skin. But sometimes bacteria can get trapped in a follicle and cause infection. This causes a bumpy rash. The area over the follicles is red and raised. It may itch or be painful. The bumps may have fluid (pus) inside. The pus may leak and then form crusts. Sores can spread to other areas of the body. Once it goes away, folliculitis can come back at any time. Severe cases may cause lasting (permanent) hair loss and scarring.   Folliculitis can happen anywhere on the body  where hair grows. It can be caused by rubbing from tight clothing. Ingrown hairs can cause it. Soaking in a hot tub or swimming pool that has bacteria in the water can cause it. It may also occur if a hair follicle is blocked by a bandage.   Sores often go away in a few days with no treatment. In some cases, medicine may be given. A small piece of skin or pus may be taken to find the type of bacteria causing the infection.   Home care  The healthcare provider may prescribe an antibiotic cream or ointment. Antibiotics taken by mouth (oral) may also be prescribed. Or you may be told to use an over-the-counter antibiotic cream. Follow all instructions when using any of these medicines.   General care    Apply warm, moist compresses to the sores for 20 minutes up to 3 times a day. You can make a compress by soaking a cloth in warm water. Squeeze out excess water.    Don t cut, poke, or squeeze the sores. This can be painful and spread infection.    Don t scratch the affected area. Scratching can delay healing.    Don t shave the areas affected by folliculitis.    If the sores leak fluid, cover the area with a nonstick gauze bandage. Use as little tape as possible. Carefully get rid of all soiled bandages.    Dress in loose cotton clothing.    Change sheets and blankets if they are soiled by pus. Wash all clothes, towels, sheets, and cloth diapers in soap and hot water. Don't share clothes, towels, or sheets with other family members.    Don't soak the sores in bath water. This can spread infection. Instead keep the area clean by gently washing sores with soap and warm water.    Wash your hands or use antibacterial gels often to prevent spreading the bacteria.    Follow-up care  Follow up with your healthcare provider, or as advised.  When to get medical advice  Call your healthcare provider right away if any of these occur:    Fever of 100.4 F (38 C) or higher, or as directed by your provider    Rash spreads    Rash  does not get better with treatment    Redness or swelling that gets worse    Rash becomes more painful    Foul-smelling fluid leaking from the skin    Rash improves, but then comes back   [employer] last reviewed this educational content on 8/1/2019 2000-2021 [employer]. All rights reserved. This information is not intended as a substitute for professional medical care. Always follow your healthcare professional's instructions.

## 2021-10-20 ENCOUNTER — OFFICE VISIT (OUTPATIENT)
Dept: FAMILY MEDICINE | Facility: CLINIC | Age: 41
End: 2021-10-20
Payer: COMMERCIAL

## 2021-10-20 VITALS
HEIGHT: 67 IN | HEART RATE: 73 BPM | TEMPERATURE: 98.3 F | RESPIRATION RATE: 19 BRPM | OXYGEN SATURATION: 97 % | BODY MASS INDEX: 45.99 KG/M2 | DIASTOLIC BLOOD PRESSURE: 68 MMHG | SYSTOLIC BLOOD PRESSURE: 140 MMHG | WEIGHT: 293 LBS

## 2021-10-20 DIAGNOSIS — I10 ESSENTIAL HYPERTENSION: Primary | ICD-10-CM

## 2021-10-20 DIAGNOSIS — R21 RASH: ICD-10-CM

## 2021-10-20 PROCEDURE — 99214 OFFICE O/P EST MOD 30 MIN: CPT | Performed by: PHYSICIAN ASSISTANT

## 2021-10-20 RX ORDER — MUPIROCIN 20 MG/G
OINTMENT TOPICAL 3 TIMES DAILY
Qty: 30 G | Refills: 1 | Status: SHIPPED | OUTPATIENT
Start: 2021-10-20 | End: 2022-03-31

## 2021-10-20 RX ORDER — LABETALOL 100 MG/1
200 TABLET, FILM COATED ORAL 2 TIMES DAILY
Qty: 120 TABLET | Refills: 2 | Status: CANCELLED | OUTPATIENT
Start: 2021-10-20

## 2021-10-20 RX ORDER — LABETALOL 300 MG/1
300 TABLET, FILM COATED ORAL 2 TIMES DAILY
Qty: 180 TABLET | Refills: 0 | Status: SHIPPED | OUTPATIENT
Start: 2021-10-20 | End: 2021-12-08

## 2021-10-20 ASSESSMENT — PAIN SCALES - GENERAL: PAINLEVEL: NO PAIN (0)

## 2021-10-20 ASSESSMENT — MIFFLIN-ST. JEOR: SCORE: 2058.54

## 2021-10-20 NOTE — PROGRESS NOTES
Assessment & Plan  increase labetolol dose,  Skin lesion likely healing  localized skin staph infections.  Problem List Items Addressed This Visit     Essential hypertension - Primary    Relevant Medications    labetalol (NORMODYNE) 300 MG tablet      Other Visit Diagnoses     Rash        Relevant Medications    mupirocin (BACTROBAN) 2 % external ointment              20 minutes spent on the date of the encounter doing chart review, history and exam, documentation and further activities per the note  {   Return in about 3 months (around 1/20/2022) for PCP Follow-up.    NEGAR Garcia  [facility] MARJ Allen is a 41 year old who presents for the following health issues   HPI   Hyperlipidemia Follow-Up    Are you regularly taking any medication or supplement to lower your cholesterol?   No    Are you having muscle aches or other side effects that you think could be caused by your cholesterol lowering medication?  No    Hypertension Follow-up    Do you check your blood pressure regularly outside of the clinic? No     Are you following a low salt diet? No    Are your blood pressures ever more than 140 on the top number (systolic) OR more   than 90 on the bottom number (diastolic), for example 140/90? No      How many servings of fruits and vegetables do you eat daily?  2-3    On average, how many sweetened beverages do you drink each day (Examples: soda, juice, sweet tea, etc.  Do NOT count diet or artificially sweetened beverages)?   0    How many days per week do you exercise enough to make your heart beat faster? 3 or less    How many minutes a day do you exercise enough to make your heart beat faster? 10 - 19    How many days per week do you miss taking your medication? 0    Having itchy bumps on inner thighs and buttocks.  Her kids were recently treated for staph and scabies and tinea. Possibly the one for staph was most effective.  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       Review of Systems   Cardio hx htn      Objective    BP (!) 140/68   Pulse 73   Temp 98.3  F (36.8  C) (Oral)   Resp 19   Ht 1.71 m (5' 7.32\")   Wt 136.1 kg (300 lb)   LMP 09/25/2021 (Approximate)   SpO2 97%   BMI 46.54 kg/m    Body mass index is 46.54 kg/m .  Physical Exam   SKIN:  Left buttcok and rt anterior upper leg with papules that ppear like small healing boils  GENERAL: healthy, alert and no distress            "

## 2021-10-27 ENCOUNTER — VIRTUAL VISIT (OUTPATIENT)
Dept: SURGERY | Facility: CLINIC | Age: 41
End: 2021-10-27
Payer: COMMERCIAL

## 2021-10-27 ENCOUNTER — TRANSFERRED RECORDS (OUTPATIENT)
Dept: HEALTH INFORMATION MANAGEMENT | Facility: CLINIC | Age: 41
End: 2021-10-27
Payer: COMMERCIAL

## 2021-10-27 DIAGNOSIS — E66.01 MORBID OBESITY (H): Primary | ICD-10-CM

## 2021-10-27 NOTE — LETTER
10/27/2021         RE: Tiffany Sanz  [address]  [place] [place] [address]        Dear Colleague,    Thank you for referring your patient, Tiffany Sanz, to the [facility] SURGERY CLINIC AND BARIATRICS CARE [place]. Please see a copy of my visit note below.    Video-Visit Details    Type of service:  Video Visit    Video Start Time (time video started): 11:30 AM    Video End Time (time video stopped): 11:54 AM    Originating Location (pt. Location): Home    Distant Location (provider location):  Home office    Mode of Communication:  Video Conference via [facility]    Physician has received verbal consent for a Video Visit from the patient? Yes    Tiffany has now made significant changes in her eating and lifestyle and she has lost a lot of weight. She now appears ready for the surgery. A report will be sent to Digna Galvan. F43.9; E66.01    Davis Duke, PhD            Again, thank you for allowing me to participate in the care of your patient.        Sincerely,        Davis Duke, PhD

## 2021-10-27 NOTE — PROGRESS NOTES
Video-Visit Details    Type of service:  Video Visit    Video Start Time (time video started): 11:30 AM    Video End Time (time video stopped): 11:54 AM    Originating Location (pt. Location): Home    Distant Location (provider location):  Home office    Mode of Communication:  Video Conference via [facility]    Physician has received verbal consent for a Video Visit from the patient? Yes    Tiffany has now made significant changes in her eating and lifestyle and she has lost a lot of weight. She now appears ready for the surgery. A report will be sent to Digna Galvan. F43.9; E66.01    Davis Duke, PhD

## 2021-11-23 ENCOUNTER — TELEPHONE (OUTPATIENT)
Dept: FAMILY MEDICINE | Facility: CLINIC | Age: 41
End: 2021-11-23
Payer: COMMERCIAL

## 2021-11-23 NOTE — TELEPHONE ENCOUNTER
Writer contact patient regarding questions about the booster vaccine.     Patient stated that she had received the J&J vaccine and was wondering if she could get the booster. Per the CDC patient is eligible to receive the COVID booster unless advised not to by provider.     Writer advised the patient to review the CDC website for more information.     Rebeca Dela RN, BSN

## 2021-11-23 NOTE — TELEPHONE ENCOUNTER
Reason for Call:  Booster Question    Detailed comments: Pt would like to receive Pfizer Booster. Pt originally received J&J. Would like to talk with provider to see if that's okay.    Phone Number Patient can be reached at: [phone]    Best Time: Anytime    Can we leave a detailed message on this number? Yes    Call taken on 11/23/2021 at 10:50 AM by Fernandez Nguyen

## 2021-12-07 DIAGNOSIS — I10 ESSENTIAL HYPERTENSION: ICD-10-CM

## 2021-12-08 RX ORDER — LABETALOL 300 MG/1
300 TABLET, FILM COATED ORAL 2 TIMES DAILY
Qty: 180 TABLET | Refills: 0 | Status: SHIPPED | OUTPATIENT
Start: 2021-12-08 | End: 2022-04-22

## 2021-12-08 NOTE — TELEPHONE ENCOUNTER
BP Readings from Last 3 Encounters:   10/20/21 (!) 140/68   10/08/21 139/81   10/08/21 (!) 142/80     Alexia Bermudez BSN, RN

## 2021-12-14 ENCOUNTER — IMMUNIZATION (OUTPATIENT)
Dept: NURSING | Facility: CLINIC | Age: 41
End: 2021-12-14
Payer: COMMERCIAL

## 2021-12-14 PROCEDURE — 0004A PR COVID VAC PFIZER DIL RECON 30 MCG/0.3 ML IM: CPT

## 2021-12-14 PROCEDURE — 91300 PR COVID VAC PFIZER DIL RECON 30 MCG/0.3 ML IM: CPT

## 2022-01-23 ENCOUNTER — MYC MEDICAL ADVICE (OUTPATIENT)
Dept: FAMILY MEDICINE | Facility: CLINIC | Age: 42
End: 2022-01-23
Payer: COMMERCIAL

## 2022-01-23 NOTE — PROGRESS NOTES
Tiffany is a 41 year old who is being evaluated via a billable video visit.      If the video visit is dropped, the invitation should be resent by: Text to cell phone: [phone]  Will anyone else be joining your video visit? No      Video-Visit Details    Type of service:  Video Visit    Video Start Time: 12:05 PM    Video End Time:12:25 PM        Originating Location (pt. Location): Home    Distant Location (provider location):  [facility] [place]     Platform used for Video Visit: Flora        Bariatric Pre-Surgery Visit    CC: Obesity    HPI: I had the pleasure of seeing Tiffany in the office today to check in.   I saw the patient last time to review bariatric education.   She is preparing for the gastric sleeve bariatric surgery. She has seen her OB/Gyn and is considering an IUD for heavy menses.  She may also get an iron infusion if her iron has not improved by next week.     Wt Readings from Last 4 Encounters:   01/24/22 298 lb (135.2 kg)   10/20/21 300 lb (136.1 kg)   10/08/21 297 lb (134.7 kg)   08/19/21 302 lb (137 kg)      BARIATRIC METRICS:  Current Weight: 302 lb (137 kg)  Body mass index is 46.67 kg/m .   Wt change since last visit (lbs): -5  Cumulative weight loss (lbs): 3    Labs Reviewed:  Hemoglobin A1C POCT   Date Value Ref Range Status   06/11/2021 5.5 0 - 5.6 % Final     Comment:     Normal <5.7% Prediabetes 5.7-6.4%  Diabetes 6.5% or higher - adopted from ADA   consensus guidelines.       Vitamin D Deficiency screening   Date Value Ref Range Status   06/11/2021 29 20 - 75 ug/L Final     Comment:     Season, race, dietary intake, and treatment affect the concentration of   25-hydroxy-Vitamin D. Values may decrease during winter months and increase   during summer months. Values 20-29 ug/L may indicate Vitamin D insufficiency   and values <20 ug/L may indicate Vitamin D deficiency.  Vitamin D determination is routinely performed by an immunoassay specific for   25  hydroxyvitamin D3.  If an individual is on vitamin D2 (ergocalciferol)   supplementation, please specify 25 OH vitamin D2 and D3 level determination by   LCMSMS test [id number].       TSH   Date Value Ref Range Status   02/26/2019 1.50 0.40 - 4.00 mU/L Final     Sodium   Date Value Ref Range Status   06/11/2021 139 133 - 144 mmol/L Final     Potassium   Date Value Ref Range Status   06/11/2021 4.0 3.4 - 5.3 mmol/L Final     Chloride   Date Value Ref Range Status   06/11/2021 107 94 - 109 mmol/L Final     Carbon Dioxide   Date Value Ref Range Status   06/11/2021 29 20 - 32 mmol/L Final     Anion Gap   Date Value Ref Range Status   06/11/2021 3 3 - 14 mmol/L Final     Glucose   Date Value Ref Range Status   06/11/2021 93 70 - 99 mg/dL Final     Urea Nitrogen   Date Value Ref Range Status   06/11/2021 9 7 - 30 mg/dL Final     Creatinine   Date Value Ref Range Status   06/11/2021 0.80 0.52 - 1.04 mg/dL Final     GFR Estimate   Date Value Ref Range Status   06/11/2021 >90 >60 mL/min/[1.73_m2] Final     Comment:     Non  GFR Calc  Starting 12/18/2018, serum creatinine based estimated GFR (eGFR) will be   calculated using the Chronic Kidney Disease Epidemiology Collaboration   (CKD-EPI) equation.       Calcium   Date Value Ref Range Status   06/11/2021 8.7 8.5 - 10.1 mg/dL Final     Bilirubin Total   Date Value Ref Range Status   06/11/2021 0.3 0.2 - 1.3 mg/dL Final     Alkaline Phosphatase   Date Value Ref Range Status   06/11/2021 70 40 - 150 U/L Final     ALT   Date Value Ref Range Status   06/11/2021 29 0 - 50 U/L Final     AST   Date Value Ref Range Status   06/11/2021 19 0 - 45 U/L Final     Cholesterol   Date Value Ref Range Status   11/28/2017 217 (H) <=199 mg/dL Final     Direct Measure HDL   Date Value Ref Range Status   11/28/2017 53 >=50 mg/dL Final     LDL Cholesterol Calculated   Date Value Ref Range Status   11/28/2017 141 (H) <=129 mg/dL Final     LDL Cholesterol Direct   Date Value Ref Range  "Status   06/29/2012 153 (H) <130 mg/dL Final     Triglycerides   Date Value Ref Range Status   11/28/2017 116 <=149 mg/dL Final     WBC   Date Value Ref Range Status   06/11/2021 5.6 4.0 - 11.0 10e9/L Final     WBC Count   Date Value Ref Range Status   09/21/2021 7.2 4.0 - 11.0 10e3/uL Final     Hemoglobin   Date Value Ref Range Status   09/21/2021 10.9 (L) 11.7 - 15.7 g/dL Final   06/11/2021 9.1 (L) 11.7 - 15.7 g/dL Final     Hematocrit   Date Value Ref Range Status   09/21/2021 35.7 35.0 - 47.0 % Final   06/11/2021 30.4 (L) 35.0 - 47.0 % Final     MCV   Date Value Ref Range Status   09/21/2021 82 78 - 100 fL Final   06/11/2021 79 78 - 100 fl Final     Platelet Count   Date Value Ref Range Status   09/21/2021 167 150 - 450 10e3/uL Final   06/11/2021 228 150 - 450 10e9/L Final     Comment:     Automated count confirmed.  Giant platelets are present.       Current Outpatient Medications   Medication     Cholecalciferol (VITAMIN D) 50 MCG (2000 UT) CAPS     Cyanocobalamin (B-12) 100 MCG TABS     Fe Bisgly-Succ-C-Thre-B12-FA (IRON-150 PO)     labetalol (NORMODYNE) 300 MG tablet     Multiple Vitamins-Minerals (MULTIVITAL PO)     mupirocin (BACTROBAN) 2 % external ointment     Zinc Sulfate (ZINC 15 PO)     labetalol (NORMODYNE) 100 MG tablet     No current facility-administered medications for this visit.       PE:  Ht 5' 7\" (1.702 m)   Wt 298 lb (135.2 kg)   BMI 46.67 kg/m      GENERAL:  Good development and normal affect in no acute distress. Patient is alert and orientated x 3 and answers all questions appropriately.  HEENT: Head is normocephalic, nontraumatic. Pupils equal and round without conjunctival injection. Neck is supple without lymphadenopathy, thyroidmegaly, or mass. Trachea midline. Dentition WNL.   CARDIOVASCULAR:  Regular rate and rhythm without murmurs, rubs, or gallops.  RESPIRATORY: Lungs are clear to auscultation bilaterally with good breath sounds.  GASTROINTESTINAL: Abdomen is obese, " nondistended, soft, nontender, without organomegaly or masses. No bruits.  LOWER EXTREMITIES: No LE edema bilaterally, no cyanosis, ulceration, or chronic venous stasis noted.  MUSCULOSKELETAL:  Moves all 4 extremities equal and strong. Has a normal gait.   NEUROLOGIC: Cranial nerves II-XII grossly intact.  SKIN: No intertriginous irritation or rash.     ASSESSMENT:    Morbid obesity (H)  Encounter for pre-bariatric surgery counseling and education*  Essential hypertension  Iron deficiency anemia due to chronic blood loss    Plan:  Reviewed tasklist  Lose 5 lbs prior to surgery.  Goal Weight: 300 lbs- completed    Have preoperative laboratory tests drawn.- completed     Psychological Evaluation with MMPI and clearance for weight loss surgery.- pending, will schedule today with Dr. Duke.    Achieve clearance from dietitian to see surgeon.- pending    See Primary Care provider regarding elevated blood pressure.-Completed with Ob/Gyn, improved.  Is on 1 iron supplement and a prenatal with iron    See Primary Care provider regarding iron deficiency anemia. - seeing OB/GYN for management.    Today we reviewed her tasklist.  I helped Tiffany make an appt with the dietician for this afternoon.  Once the patient has completed their task list and there are no further recommendations, they will see the surgeon for consultation for bariatric surgery.  At that visit she will discuss her ventral hernia an options for repair as well.  All questions were answered.    Tiffany will continue taking her Ferrous sulfate and multivitamin with iron.  Iron labs and CBC were ordered to recheck levels today.      FOLLOW-UP:  Return to clinic for diet follow up until cleared.     25  minutes spent on the date of the encounter reviewing chart, labs, patient education, charting, and plan of care.

## 2022-01-24 ENCOUNTER — VIRTUAL VISIT (OUTPATIENT)
Dept: SURGERY | Facility: CLINIC | Age: 42
End: 2022-01-24
Payer: COMMERCIAL

## 2022-01-24 VITALS — HEIGHT: 67 IN | BODY MASS INDEX: 45.99 KG/M2 | WEIGHT: 293 LBS

## 2022-01-24 VITALS — HEIGHT: 66 IN | WEIGHT: 293 LBS | BODY MASS INDEX: 47.09 KG/M2

## 2022-01-24 DIAGNOSIS — Z13.0 SCREENING FOR IRON DEFICIENCY ANEMIA: ICD-10-CM

## 2022-01-24 DIAGNOSIS — E66.01 MORBID OBESITY (H): ICD-10-CM

## 2022-01-24 DIAGNOSIS — D50.0 IRON DEFICIENCY ANEMIA DUE TO CHRONIC BLOOD LOSS: Primary | ICD-10-CM

## 2022-01-24 DIAGNOSIS — K91.2 POSTSURGICAL MALABSORPTION: ICD-10-CM

## 2022-01-24 DIAGNOSIS — Z98.84 BARIATRIC SURGERY STATUS: ICD-10-CM

## 2022-01-24 PROCEDURE — 99213 OFFICE O/P EST LOW 20 MIN: CPT | Mod: 95 | Performed by: PHYSICIAN ASSISTANT

## 2022-01-24 PROCEDURE — 97803 MED NUTRITION INDIV SUBSEQ: CPT | Mod: 95 | Performed by: DIETITIAN, REGISTERED

## 2022-01-24 ASSESSMENT — MIFFLIN-ST. JEOR
SCORE: 2044.35
SCORE: 2020.53

## 2022-01-24 NOTE — PATIENT INSTRUCTIONS
Joni Allen,      It was great seeing you again today. Here's the goals we discussed focusing on this month:      1. Vitamins/mineals  - Multivitamin: Continue to take daily, mid-day  - Calcium: Increase to 2x/day - morning and evening  - Vitamin D: verify that you are taking 5000 international unit(s) per day  - Iron: Continue to take per provider recommendations. Take this at the same time as your multivitamin (mid-day)      2. Eat slowly and chew well  - Take 20-30 minutes to eat each meal  - Chew all food to an applesauce consistency      3. Beverages:  - Separate fluids and meals by 30 minutes  - Avoid caffeine and carbonation  - Limit juice to 1 cup per day      4. Meal patterns:  - Avoid skipping meals  - Eat your first meal within 1 hour of waking up, then have a meal every 4-6 hours        Plan on following up in 1 month. This can be scheduled via our call center at . Have a great week and reach out if you have any questions or concerns!      Carmella Negro, TENZIN, LD  Clinical Dietitian

## 2022-01-24 NOTE — PROGRESS NOTES
"Tiffany is a 41 year old who is being evaluated via a billable video visit.      How would you like to obtain your AVS? MyChart  If the video visit is dropped, the invitation should be resent by: Text to cell phone: [phone]  Will anyone else be joining your video visit? No      Video Start Time: 1:06pm        Video-Visit Details    Type of service:  Video Visit    Video End Time:1:27pm    Originating Location (pt. Location): Home    Distant Location (provider location):  [facility] [place]     Platform used for Video Visit: [employer]     PRE SURGICAL WEIGHT LOSS NUTRITION APPOINTMENT    Tiffany Sanz  1980  female  [id number]  41 year old    ASSESSMENT    Desired Surgical Procedure: sleeve    REASON FOR VISIT:  Tiffany Sanz is a 41 year old year old female presents today for a pre-surgical weight loss follow-up appointment. Patient accompanied by self.    DIAGNOSIS:  Weight Status Obesity Grade III BMI >40    ANTHROPOMETRICS:  Height:5' 5.5\"   Initial Weight: 305 lbs     Weight last visit: n/a    Current weight: 298 lbs 0 oz  BMI: Body mass index is 48.84 kg/m .    VITAMINS AND MINERALS:  1 Multivitamin with Minerals (mid-day)  600 mg Calcium with Vitamin D (evening)  (pt unsure of dose) International units Vitamin D  Iron (-mid-day)      NUTRITION HISTORY:  Breakfast: skips occasionally  eggs  cereal  oatmeal  fruit   Lunch: rice + stew  pasta + chicken + vegetables +/- avocados   Supper: stew/soup +/- rice or pasta + vegetables  stir wolf  irizarry   Snacks: occasional - fruit, protein bars  Fluids Consumed: water (48oz), juice (8-16oz), coffee (8-16oz, caffeinated)   Eating slower: No  Chewing foods thoroughly: No  Take 20-30 minutes to consume each meal: No  Fluids and meals separate by at least 30 minutes: No  Carbonation: none  Caffeine: yes  Additional Information: Reviewed necessary lifestyle changes for bariatric surgery. Pt works overnights and finds regular meal " patterns to be difficult. Would like to get to gym more frequently but struggles with 's willingness to watch children while she's gone - trying to do home workouts videos with kids.     PHYSICAL ACTIVITY:  None/inconsistent; has some workout videos at home that occasionally does with kids    DIAGNOSIS:  Previous Nutrition Diagnosis: Obesity related to long history of self- monitoring deficit and excessive energy intake evidenced by previous BMI of >40 kg/m2  No change, modified below    Previous goals:   Take all pre-op vitamins/minerals per guidelines - partially met  Consistently separate fluids and meals by 30 minutes - not met  Chew all food to an applesauce consistency - not met  Eliminate caffeine/carbonation - no met  Obtain updated weight - met    Current Nutrition Diagnosis: Obesity related to long history of self-monitoring deficit and excessive energy intake as evidenced by BMI of 46.6 kg/m2.    INTERVENTION:  Nutrition Prescription: Recommended energy/nutrient modification.    GOALS:  Increase calcium to BID  Eat 3 meals/day at consistent intervals  Eat slowly and chew well  Separate fluids/meals by 30 minutes  Avoid caffeine, carbonation and caloric beverages    Intervention:  - Discussed progress towards previous goals.  - Reinforced importance of making behavior changes in preparation for bariatric surgery.   - Assessed learning needs and learning preferences       NUTRITION MONITORING AND EVALUATION:  Anticipated compliance: fair  Patient demonstrated fair-good understanding.     Follow up: Continue to monitor patient closely regarding weight loss and diet.  # of visits needed: 1-2  Cleared by RD: No     TIME SPENT WITH PATIENT: 21 minutes      Carmella Negro RD, LD  Clinical Dietitian

## 2022-01-24 NOTE — TELEPHONE ENCOUNTER
Lab Results   Component Value Date    ABO O 06/04/2019    RH Pos 06/04/2019     Please refer to mychart.  Winsome Swain RN

## 2022-02-16 ENCOUNTER — LAB (OUTPATIENT)
Dept: LAB | Facility: CLINIC | Age: 42
End: 2022-02-16
Payer: COMMERCIAL

## 2022-02-16 DIAGNOSIS — D50.0 IRON DEFICIENCY ANEMIA DUE TO CHRONIC BLOOD LOSS: ICD-10-CM

## 2022-02-16 LAB
ERYTHROCYTE [DISTWIDTH] IN BLOOD BY AUTOMATED COUNT: 15.1 % (ref 10–15)
FERRITIN SERPL-MCNC: 21 NG/ML (ref 12–150)
HCT VFR BLD AUTO: 36 % (ref 35–47)
HGB BLD-MCNC: 11 G/DL (ref 11.7–15.7)
IRON SATN MFR SERPL: 7 % (ref 15–46)
IRON SERPL-MCNC: 29 UG/DL (ref 35–180)
MCH RBC QN AUTO: 26.6 PG (ref 26.5–33)
MCHC RBC AUTO-ENTMCNC: 30.6 G/DL (ref 31.5–36.5)
MCV RBC AUTO: 87 FL (ref 78–100)
PLATELET # BLD AUTO: 211 10E3/UL (ref 150–450)
RBC # BLD AUTO: 4.13 10E6/UL (ref 3.8–5.2)
TIBC SERPL-MCNC: 396 UG/DL (ref 240–430)
WBC # BLD AUTO: 6.7 10E3/UL (ref 4–11)

## 2022-02-16 PROCEDURE — 83550 IRON BINDING TEST: CPT

## 2022-02-16 PROCEDURE — 85027 COMPLETE CBC AUTOMATED: CPT

## 2022-02-16 PROCEDURE — 36415 COLL VENOUS BLD VENIPUNCTURE: CPT

## 2022-02-16 PROCEDURE — 82728 ASSAY OF FERRITIN: CPT

## 2022-02-17 PROBLEM — O09.529 ANTEPARTUM MULTIGRAVIDA OF ADVANCED MATERNAL AGE: Status: RESOLVED | Noted: 2019-06-04 | Resolved: 2020-03-04

## 2022-02-18 ENCOUNTER — VIRTUAL VISIT (OUTPATIENT)
Dept: SURGERY | Facility: CLINIC | Age: 42
End: 2022-02-18
Payer: COMMERCIAL

## 2022-02-18 VITALS — HEIGHT: 66 IN | WEIGHT: 293 LBS | BODY MASS INDEX: 47.09 KG/M2

## 2022-02-18 DIAGNOSIS — E66.01 MORBID OBESITY (H): ICD-10-CM

## 2022-02-18 PROCEDURE — 97803 MED NUTRITION INDIV SUBSEQ: CPT | Mod: GT | Performed by: DIETITIAN, REGISTERED

## 2022-02-18 NOTE — PATIENT INSTRUCTIONS
Joni Allen!      It was great chatting with you again today! Here's a summary of the goals we discussed:    1. Take all pre-op vitamins/minerals per guidelines:  Multivitamin: daily  Calcium: 500mg 3x/day OR 600mg 2x/day  Vitamin D: 5000 international unit(s) daily  Iron: daily  **The easiest schedule for vitamins/minerals is to take Calcium with meals, and take all other supplements at bedtime      2. Continue to practice:  - Eating slowly (20-30 minutes per meal)  - Chewing well (applesauce consistency)    3. Consistently separate fluids and meals by 30 minutes    4. Eliminate any remaining caffeine      Plan on following up in 2-4 weeks. This can be scheduled via our call center at . Reach out sooner with any questions or concerns. Have a great day!      Carmella Negro RD, LD  Clinical Dietitian

## 2022-02-18 NOTE — PROGRESS NOTES
"Tiffany is a 41 year old who is being evaluated via a billable video visit.      How would you like to obtain your AVS? MyChart  If the video visit is dropped, the invitation should be resent by: Text to cell phone: [phone]  Will anyone else be joining your video visit? No      Video Start Time: 11:31am        Video-Visit Details    Type of service:  Video Visit    Video End Time:11:49am    Originating Location (pt. Location): stationary vehicle    Distant Location (provider location): Provider Remote Setting    Platform used for Video Visit: [facility]     PRE SURGICAL WEIGHT LOSS NUTRITION APPOINTMENT    Tiffany Sanz  1980  female  [id number]  41 year old    ASSESSMENT    Desired Surgical Procedure: gastric sleeve    REASON FOR VISIT:  Tiffany Sanz is a 41 year old year old female presents today for a pre-surgical weight loss follow-up appointment. Patient accompanied by self.    DIAGNOSIS:  Weight Status Obesity Grade III BMI >40    ANTHROPOMETRICS:  Height: 65.5\"    Initial Weight: 305 lbs     Weight last visit: 298 lbs    Current weight: 300 lbs 0 oz   BMI: Body mass index is 49.16 kg/m .      VITAMINS AND MINERALS:  1 Multivitamin with Minerals (8-9am)  (pt unsure of dose) Calcium with Vitamin D (evening)  1000 International units Vitamin D  Iron (8-9am)    NUTRITION HISTORY:  Breakfast: [wakes at 7am, eats at 8am] protein shake  Lunch:  [noon] meat + vegetables/fruit + starch  Supper: [4-5pm] burgers or fish + vegetables + starch + fruit    Add'l Supper: [8:30-9pm, works overnights] chicken + gravy + rice or pasta + vegetables + fruit  Snacks: fruit or sunflower seeds  Fluids Consumed: Water, Gatorade Zero, Premier Protein, Iced Coffee (only had 2x this month)  Eating slower: Yes  Chewing foods thoroughly: Yes  Take 20-30 minutes to consume each meal: Yes  Fluids and meals separate by at least 30 minutes: Sometimes  Carbonation: none  Caffeine: reducing  Additional Information: Pt eating slower and " chewing well. Overall doing better with committing to bariatric habits this month. Reviewed pre-op vitamin/mineral regimen.     PHYSICAL ACTIVITY:  Type: home exercise - weighted jumprope, walking around home  Frequency: 3 (days per week)  Duration: 15-30 (minutes)     DIAGNOSIS:  Previous Nutrition Diagnosis: Obesity related to long history of self- monitoring deficit and excessive energy intake evidenced by BMI of 48.84 kg/m2  No change, modified below    Previous goals:   Increase calcium to BID - not met  Eat 3 meals/day at consistent intervals - met  Eat slowly and chew well - met  Separate fluids/meals by 30 minutes - improving  Avoid caffeine, carbonation and caloric beverages - improving    Current Nutrition Diagnosis: Obesity related to long history of self-monitoring deficit and excessive energy intake as evidenced by BMI of 49.16 kg/m2.    INTERVENTION:  Nutrition Prescription: Recommended energy/nutrient modification.    GOALS:  Take all pre-op vitamins/minerals per guidelines  Separate fluids and meals by 30 minutes  Continue to eat slowly and chew well  Eliminate caffeine        Intervention:  - Discussed progress towards previous goals.  - Reinforced importance of making behavior changes in preparation for bariatric surgery.   - Assessed learning needs and learning preferences       NUTRITION MONITORING AND EVALUATION:  Anticipated compliance: fair-good  Patient demonstrated fair-good understanding.     Follow up: Continue to monitor patient closely regarding weight loss and diet.  # of visits needed: 1; okay to f/u in 2-4 weeks  Cleared by RD: No     TIME SPENT WITH PATIENT: 18 minutes      Carmella Negro RD, LD  Clinical Dietitian

## 2022-02-28 ENCOUNTER — ALLIED HEALTH/NURSE VISIT (OUTPATIENT)
Dept: FAMILY MEDICINE | Facility: CLINIC | Age: 42
End: 2022-02-28
Payer: COMMERCIAL

## 2022-02-28 DIAGNOSIS — Z11.1 SCREENING EXAMINATION FOR PULMONARY TUBERCULOSIS: Primary | ICD-10-CM

## 2022-02-28 PROCEDURE — 86580 TB INTRADERMAL TEST: CPT

## 2022-02-28 PROCEDURE — 99207 PR NO CHARGE NURSE ONLY: CPT

## 2022-02-28 NOTE — NURSING NOTE
"Patient is here today for a Mantoux (TST) test placement.    Is there a current order in the chart? No. Placed order according to standing order (reference the \"Skin Test- Tuberculosis Screening- Ambulatory Care\" standing order in [employer]). Review the Inclusion and Exclusion Criteria.    Inclusion Criteria  Pre-employment screening for healthcare workers and correctional facility staff - Administer two-step TST. Patient to return for second test in 1-3 weeks after first test is read.     Exclusion Criteria  None - Place order for Mantoux (TST) test per standing order.    Reason for Mantoux (TST) in patient's own words: For work    Patient needs form signed? Yes- completed per clinic's forms process.    Instructed patient to wait for 15 minutes post injection and to report any reactions immediately to staff.    Told patient to return to clinic in 48-72 hours to have Mantoux (TST) read.     Madeline Angeles MA            "

## 2022-03-02 ENCOUNTER — ALLIED HEALTH/NURSE VISIT (OUTPATIENT)
Dept: NURSING | Facility: CLINIC | Age: 42
End: 2022-03-02
Payer: COMMERCIAL

## 2022-03-02 DIAGNOSIS — Z11.1 SCREENING EXAMINATION FOR PULMONARY TUBERCULOSIS: Primary | ICD-10-CM

## 2022-03-02 LAB
PPDINDURATION: 0 MM (ref 0–4.99)
PPDREDNESS: PRESENT

## 2022-03-02 NOTE — PROGRESS NOTES
Patient is here today for a Mantoux (TST) test results.    Did patient return to clinic 48-72 hours from Mantoux (TST) placement:   Yes -     PPD Induration   Date Value Ref Range Status   03/02/2022 0 0 - 4.99 mm Final     Comment:     No induration present, redness measures 7mm. Brina Parra RN     PPD Redness   Date Value Ref Range Status   03/02/2022 Present  Final       Induration Size? Induration 0 mm    Patient needs form signed? No    Patient reports having previously had the BCG Vaccine: No    Does patient need a two step? No        Brina Parra RN  [facility]

## 2022-03-09 ENCOUNTER — VIRTUAL VISIT (OUTPATIENT)
Dept: SURGERY | Facility: CLINIC | Age: 42
End: 2022-03-09
Payer: COMMERCIAL

## 2022-03-09 VITALS — BODY MASS INDEX: 47.09 KG/M2 | WEIGHT: 293 LBS | HEIGHT: 66 IN

## 2022-03-09 DIAGNOSIS — E66.01 MORBID OBESITY (H): ICD-10-CM

## 2022-03-09 PROCEDURE — 97803 MED NUTRITION INDIV SUBSEQ: CPT | Mod: 95 | Performed by: DIETITIAN, REGISTERED

## 2022-03-09 NOTE — PROGRESS NOTES
"Tiffany is a 41 year old who is being evaluated via a billable video visit.      How would you like to obtain your AVS? MyChart  If the video visit is dropped, the invitation should be resent by: Text to cell phone: [phone]  Will anyone else be joining your video visit? No      Video Start Time: 9:10am    Video-Visit Details    Type of service:  Video Visit    Video End Time: 9:24am    Distant Location (provider location):  [facility] [place]     Platform used for Video Visit: Phone (pt forgot about visit and not signed into video software)    PRE SURGICAL WEIGHT LOSS NUTRITION APPOINTMENT    Tiffany Sanz  1980  female  [id number]  41 year old    ASSESSMENT    Desired Surgical Procedure: gastric sleeve    REASON FOR VISIT:  Tiffany Sanz is a 41 year old year old female presents today for a pre-surgical weight loss follow-up appointment. Patient accompanied by self.    DIAGNOSIS:  Weight Status Obesity Grade III BMI >40    ANTHROPOMETRICS:  Height: 65.5\"    Initial Weight: 305 lbs     Weight last visit: 300 lbs    Current weight: 299 lbs 0 oz    BMI: Body mass index is 49 kg/m .    VITAMINS AND MINERALS:  1 Multivitamin with Minerals (morning)  400mg Calcium with Vitamin D (morning)  (pt unsure of dose) International units Vitamin D  Iron (BID)      NUTRITION HISTORY:  Breakfast: protein shake  oatmeal   Lunch: salmon + vegetables  Supper: fish or chicken + vegetables +/- rice  Snacks: fruit   Fluids Consumed: Water, Gatorade Zero  Eating slower: Yes  Chewing foods thoroughly: Yes  Take 20-30 minutes to consume each meal: Yes  Fluids and meals separate by at least 30 minutes: Usually  Carbonation: none  Caffeine: none  Additional Information: Pt feeling confident in guidelines. Stressed the importance of taking vitamins/minerals appropriately.     PHYSICAL ACTIVITY:  Type: jump roping, exercise videos, walking  Frequency: 3 (days per week)  Duration: 15-20(minutes) "     DIAGNOSIS:  Previous Nutrition Diagnosis: Obesity related to long history of self- monitoring deficit and excessive energy intake evidenced by BMI of 49.16 kg/m2  No change, modified below    Previous goals:   Take all pre-op vitamins/minerals per guidelines - not met  Separate fluids and meals by 30 minutes - improving  Continue to eat slowly and chew well - met  Eliminate caffeine - met    Current Nutrition Diagnosis: Obesity related to long history of self-monitoring deficit and excessive energy intake as evidenced by BMI of 49 kg/m2.    INTERVENTION:  Nutrition Prescription: Recommended energy/nutrient modification.    GOALS:  Take all pre-op vitamins/minerals per guidelines  Continue to practice all pre-op guidelines    Intervention:  - Discussed progress towards previous goals.  - Reinforced importance of making behavior changes in preparation for bariatric surgery.   - Assessed learning needs and learning preferences       NUTRITION MONITORING AND EVALUATION:  Anticipated compliance: fair-good  Patient demonstrated fair-good understanding.   Patient has met pre bariatric surgery diet requirements    Follow up: Continue to monitor patient closely regarding weight loss and diet.  # of visits needed: 0  Cleared by RD: Yes     TIME SPENT WITH PATIENT: 14 minutes      Carmella Negro RD, LD  Clinical Dietitian    HPI      MAEGAN      Physical Exam

## 2022-03-09 NOTE — PATIENT INSTRUCTIONS
Joni Allen!      Great chatting with you again today. Congratulations on dietary clearance! Up until surgery, focus on the followin. Continue to take all pre-op vitamins/minerals  Morning: Multivitamin + Vitamin D (5000 international unit(s)) + Iron  Lunch: 600mg Calcium  Dinner: 600mg Calcium  Bedtime: Iron    2. Continue to eat slowly and chew your food well    3. Continue to separate fluids and meals by 30 minutes    4. Remain caffeine/carbonation-free          Here's some information on post-op vitamins/minerals and diet advancement. We'll also review this with you when you're in the hospital:    Supplements after Weight Loss Surgery  [email or web address]     Your Stage 2 Diet: Low-fat Full Liquids  [email or web address]             Otherwise, keep up the great work and let us know if you need anything. Jaron () will be your point of contact, moving forward. Have a great day and we'll see you in the hospital!      Carmella Negro RD, LD  Clinical Dietitian

## 2022-04-21 ENCOUNTER — ANCILLARY PROCEDURE (OUTPATIENT)
Dept: MAMMOGRAPHY | Facility: CLINIC | Age: 42
End: 2022-04-21
Attending: FAMILY MEDICINE
Payer: COMMERCIAL

## 2022-04-21 DIAGNOSIS — Z12.31 ENCOUNTER FOR SCREENING MAMMOGRAM FOR BREAST CANCER: ICD-10-CM

## 2022-04-21 PROCEDURE — 77067 SCR MAMMO BI INCL CAD: CPT | Mod: GC

## 2022-04-22 ENCOUNTER — OFFICE VISIT (OUTPATIENT)
Dept: FAMILY MEDICINE | Facility: CLINIC | Age: 42
End: 2022-04-22
Payer: COMMERCIAL

## 2022-04-22 ENCOUNTER — MYC MEDICAL ADVICE (OUTPATIENT)
Dept: SURGERY | Facility: CLINIC | Age: 42
End: 2022-04-22

## 2022-04-22 VITALS
HEART RATE: 60 BPM | SYSTOLIC BLOOD PRESSURE: 133 MMHG | OXYGEN SATURATION: 100 % | BODY MASS INDEX: 49.49 KG/M2 | DIASTOLIC BLOOD PRESSURE: 86 MMHG | TEMPERATURE: 96.7 F | RESPIRATION RATE: 19 BRPM | WEIGHT: 293 LBS

## 2022-04-22 DIAGNOSIS — D50.0 IRON DEFICIENCY ANEMIA DUE TO CHRONIC BLOOD LOSS: ICD-10-CM

## 2022-04-22 DIAGNOSIS — I10 ESSENTIAL HYPERTENSION: Primary | ICD-10-CM

## 2022-04-22 LAB
ANION GAP SERPL CALCULATED.3IONS-SCNC: 5 MMOL/L (ref 3–14)
BUN SERPL-MCNC: 11 MG/DL (ref 7–30)
CALCIUM SERPL-MCNC: 10.1 MG/DL (ref 8.5–10.1)
CHLORIDE BLD-SCNC: 104 MMOL/L (ref 94–109)
CO2 SERPL-SCNC: 29 MMOL/L (ref 20–32)
CREAT SERPL-MCNC: 0.89 MG/DL (ref 0.52–1.04)
GFR SERPL CREATININE-BSD FRML MDRD: 83 ML/MIN/1.73M2
GLUCOSE BLD-MCNC: 96 MG/DL (ref 70–99)
POTASSIUM BLD-SCNC: 4.2 MMOL/L (ref 3.4–5.3)
SODIUM SERPL-SCNC: 138 MMOL/L (ref 133–144)

## 2022-04-22 PROCEDURE — 99213 OFFICE O/P EST LOW 20 MIN: CPT | Performed by: INTERNAL MEDICINE

## 2022-04-22 PROCEDURE — 80048 BASIC METABOLIC PNL TOTAL CA: CPT | Performed by: INTERNAL MEDICINE

## 2022-04-22 PROCEDURE — 36415 COLL VENOUS BLD VENIPUNCTURE: CPT | Performed by: INTERNAL MEDICINE

## 2022-04-22 RX ORDER — LABETALOL 300 MG/1
300 TABLET, FILM COATED ORAL 2 TIMES DAILY
Qty: 180 TABLET | Refills: 0 | Status: SHIPPED | OUTPATIENT
Start: 2022-04-22 | End: 2022-08-03

## 2022-04-22 NOTE — PROGRESS NOTES
"  Assessment & Plan     Essential hypertension  Blood pressure is very well controlled  She takes labetalol 300 mg twice a day   Continue the same  I expect the blood pressure to come down after the gastric bypass and hopefully we can stop the medications  - labetalol (NORMODYNE) 300 MG tablet; Take 1 tablet (300 mg) by mouth 2 times daily  - Basic metabolic panel  (Ca, Cl, CO2, Creat, Gluc, K, Na, BUN); Future  - Basic metabolic panel  (Ca, Cl, CO2, Creat, Gluc, K, Na, BUN)    Iron deficiency anemia due to chronic blood loss  She has iron deficiency anemia  She is waiting to get gastric bypass  Apparently she has to get some IV iron infusions before getting the bypass and she is waiting for that  I asked her to contact her bypass team        20 minutes spent on the date of the encounter doing chart review, history and exam, documentation and further activities per the note       BMI:   Estimated body mass index is 49.49 kg/m  as calculated from the following:    Height as of 3/9/22: 1.664 m (5' 5.5\").    Weight as of this encounter: 137 kg (302 lb).           Return in about 6 months (around 10/22/2022).    Nestor Loya MD  [facility] MARLON Allen is a 41 year old who presents for the following health issues     History of Present Illness       Hyperlipidemia:  She presents for follow up of hyperlipidemia.  She is taking medication to lower cholesterol. She is not having myalgia or other side effects to statin medications.    Hypertension: She presents for follow up of hypertension.  She does not check blood pressure  regularly outside of the clinic. Outside blood pressures have been over 140/90. She does not follow a low salt diet.     She eats 2-3 servings of fruits and vegetables daily.She consumes 2 sweetened beverage(s) daily.She exercises with enough effort to increase her heart rate 30 to 60 minutes per day.  She exercises with enough effort to increase her heart rate 3 " or less days per week. She is missing 2 dose(s) of medications per week.             Review of Systems   Constitutional, HEENT, cardiovascular, pulmonary, gi and gu systems are negative, except as otherwise noted.      Objective    /86 (BP Location: Left arm, Patient Position: Sitting, Cuff Size: Adult Large)   Pulse 60   Temp (!) 96.7  F (35.9  C) (Tympanic)   Resp 19   Wt 137 kg (302 lb)   SpO2 100%   BMI 49.49 kg/m    Body mass index is 49.49 kg/m .  Physical Exam   GENERAL: healthy, alert and no distress  NECK: no adenopathy, no asymmetry, masses, or scars and thyroid normal to palpation  RESP: lungs clear to auscultation - no rales, rhonchi or wheezes  CV: regular rate and rhythm, normal S1 S2, no S3 or S4, no murmur, click or rub, no peripheral edema and peripheral pulses strong  ABDOMEN: soft, nontender, no hepatosplenomegaly, no masses and bowel sounds normal  MS: no gross musculoskeletal defects noted, no edema

## 2022-05-16 ENCOUNTER — OFFICE VISIT (OUTPATIENT)
Dept: OBGYN | Facility: CLINIC | Age: 42
End: 2022-05-16
Payer: COMMERCIAL

## 2022-05-16 VITALS
WEIGHT: 293 LBS | BODY MASS INDEX: 49.16 KG/M2 | OXYGEN SATURATION: 96 % | SYSTOLIC BLOOD PRESSURE: 132 MMHG | HEART RATE: 80 BPM | DIASTOLIC BLOOD PRESSURE: 79 MMHG

## 2022-05-16 DIAGNOSIS — D50.0 IRON DEFICIENCY ANEMIA DUE TO CHRONIC BLOOD LOSS: ICD-10-CM

## 2022-05-16 DIAGNOSIS — N92.0 MENORRHAGIA WITH REGULAR CYCLE: Primary | ICD-10-CM

## 2022-05-16 DIAGNOSIS — D25.9 UTERINE LEIOMYOMA, UNSPECIFIED LOCATION: ICD-10-CM

## 2022-05-16 PROCEDURE — 99214 OFFICE O/P EST MOD 30 MIN: CPT | Performed by: OBSTETRICS & GYNECOLOGY

## 2022-05-16 NOTE — PATIENT INSTRUCTIONS
If you have any questions regarding your visit, Please contact your care team.    [employer]: [phone]    To Schedule an Appointment 24/7  Call: [phone]   Mary Jane Bojorquez DO.    FRANTZ Padron -Surgery Scheduler  Joanna - Surgery Scheduler      Inez, RN  Madhavi, RN  Lima, REA     [place]  Wednesday and Friday  8:30 a.m-5:00 p.m    [place]-Temporary  Monday 8:30 a.m-5:00 p.m    Typical Surgery day:  Tuesday [facility]  [address] [place] [address] [phone] Phone  [phone] Fax    Imaging Scheduling-All Locations [phone]    [facility]  [address] [place] [address]     Urgent Care locations:  [facility]   Monday-Friday   10 am - 8 pm  Saturday and Sunday   9 am - 5 pm   [phone]   **Surgeries** Our Surgery Schedulers will contact you to schedule. If you do not receive a call within 3 business days, please call [phone].    [facility] Labor and Delivery:  [phone]    If you need a medication refill, please contact your pharmacy. Please allow 3 business days for your refill to be completed.  As always, Thank you for trusting us with your healthcare needs!  see additional instructions from your care team below

## 2022-05-16 NOTE — PROGRESS NOTES
This 42 y/o female,  (s/p 2 C/Ss and 1 vaginal delivery), s/p tubal ligation, presents to discuss her issues with menorrhagia and resultant anemia.  She feels fine today but admits to an improved 4-day menses beginning on 2022.  However, typically her menses last 2 weeks so this could certainly account for her chronic anemic state.  She has a known history of fibroids but has not had a gyn US in years.  She has tried the Mirena and ParaGard IUDs in the past but disliked both of them and is not interested in retrying a hormonal IUD for menstrual management.  She also has tried Depo Provera but won't retry due to weight gain issues.  She disliked Nexplanon in the past due to irregular bleeding issues.  She has not tried Micronor to-date but did use combination BCPs in the past for contraception without any problem.  However, due to her hypertension issues and fibroid uterus, I would not advise using an estrogen-containing BCP.  She is currently taking po iron replacement therapy BID po but sometimes skips a dose or two because of constipation problems.  Therefore, we discussed combined use with increased fiber and/or a stool softener OTC.  Her recent hgb value on 2022 had improved at 11.0 and her ferritin value on that same date was normal at 21.  However, her iron and iron saturation index values were abnormally low.    /79 (BP Location: Left arm, Patient Position: Chair, Cuff Size: Adult Large)   Pulse 80   Wt 136.1 kg (300 lb)   LMP 2022   SpO2 96%   Breastfeeding No   BMI 49.16 kg/m    ROS:  10 systems were reviewed and the positives were listed under problems.  PE:  General- Healthy-appearing female in no acute distress  Eyes- No scleral injection or icterus  ENT- Mucus membranes moist  CV- No noticeable edema in extremities  Lymph- No noticeable cervical adenopathy  Respiratory- Non-labored breathing  Skin- No suspicious lesions or rashes visualized  Psych- Normal  affect  Assessment:  Chronic menorrhagia and anemic state, known fibroid uterus, and hypertension   Plan - Will schedule a gyn US to assess the size of her fibroids and thickness of the endometrial stripe thickness to determine if endometrial tissue sampling is needed.  If so, then will decide if a hysteroscopic approach versus office endometrial biopsy would be indicated.  We discussed possible treatment options such as Micronor, Lupron depot, and/or uterine artery ablation since she prefers to avoid major surgery such as a hysterectomy.  In the interim, will have her continue taking oral iron replacement therapy but is to add more fiber and/or stool softener to avoid constipation complaints.  She is to call Dr. Bennett in regards to her concerns regarding the increased dose of Labetalol and her new onset of eye pain to see if there is any correlation.  Will schedule a pelvic US, hopefully today.  30 minutes were spent today in chart review, the patient visit, review of tests, and documentation in regards to the issues noted above.

## 2022-05-17 ENCOUNTER — ANCILLARY PROCEDURE (OUTPATIENT)
Dept: ULTRASOUND IMAGING | Facility: CLINIC | Age: 42
End: 2022-05-17
Payer: COMMERCIAL

## 2022-05-17 DIAGNOSIS — D25.9 UTERINE LEIOMYOMA, UNSPECIFIED LOCATION: ICD-10-CM

## 2022-05-17 DIAGNOSIS — N92.0 MENORRHAGIA WITH REGULAR CYCLE: ICD-10-CM

## 2022-05-17 PROCEDURE — 76856 US EXAM PELVIC COMPLETE: CPT

## 2022-05-17 PROCEDURE — 76830 TRANSVAGINAL US NON-OB: CPT

## 2022-06-03 ENCOUNTER — TELEPHONE (OUTPATIENT)
Dept: OBGYN | Facility: CLINIC | Age: 42
End: 2022-06-03
Payer: COMMERCIAL

## 2022-06-03 NOTE — TELEPHONE ENCOUNTER
[facility] Call Center    Phone Message    May a detailed message be left on voicemail: yes     Reason for Call: Other: Tiffany called in regards to scheduling for biopsy.  She is wondering if there is anything she needs to do in preparation for the biopsy.  She is also wondering specifically what Dr. Bojorquez is going to do?  Will there be any numbing medication and how is the biopsy done.  She said it is ok to send her a [facility] message, or you can call her to discuss.     Action Taken: Message routed to:  [facility] p [phone]    Travel Screening: Not Applicable

## 2022-06-07 NOTE — TELEPHONE ENCOUNTER
Left message for patient to call back. Please see message below. Also sent MyChart message per info below.   Shyanne Eckert CMA

## 2022-06-18 ENCOUNTER — HEALTH MAINTENANCE LETTER (OUTPATIENT)
Age: 42
End: 2022-06-18

## 2022-06-23 ENCOUNTER — TELEPHONE (OUTPATIENT)
Dept: OBGYN | Facility: CLINIC | Age: 42
End: 2022-06-23

## 2022-06-23 NOTE — TELEPHONE ENCOUNTER
[facility] Call Center    Phone Message    May a detailed message be left on voicemail: yes     Reason for Call: Pt is requesting a call back to discuss questions she has about her endometrial biopsy.  Thanks.    Action Taken: Message routed to:  [facility] p [phone]    Travel Screening: Not Applicable

## 2022-06-23 NOTE — TELEPHONE ENCOUNTER
Called and spoke with pt.  Pt states she will be on her menstrual cycle during her appointment on 07/25.   I asked pt if she would be okay with going a week out and then being added to the wait list to see if something opens up when she is not on her menstrual cycle.    Pt voiced understanding and agreed to plan.    Barb Kaiser CMA 6/23/2022 9:52 AM

## 2022-07-29 ENCOUNTER — OFFICE VISIT (OUTPATIENT)
Dept: URGENT CARE | Facility: URGENT CARE | Age: 42
End: 2022-07-29
Payer: COMMERCIAL

## 2022-07-29 VITALS
HEIGHT: 66 IN | BODY MASS INDEX: 47.09 KG/M2 | TEMPERATURE: 97.1 F | DIASTOLIC BLOOD PRESSURE: 106 MMHG | WEIGHT: 293 LBS | HEART RATE: 70 BPM | OXYGEN SATURATION: 98 % | SYSTOLIC BLOOD PRESSURE: 163 MMHG

## 2022-07-29 DIAGNOSIS — M62.81 MUSCLE WEAKNESS: ICD-10-CM

## 2022-07-29 DIAGNOSIS — I10 ESSENTIAL HYPERTENSION: Primary | ICD-10-CM

## 2022-07-29 DIAGNOSIS — M79.2 NEURALGIA: ICD-10-CM

## 2022-07-29 PROCEDURE — 99214 OFFICE O/P EST MOD 30 MIN: CPT | Performed by: PHYSICIAN ASSISTANT

## 2022-07-29 ASSESSMENT — ENCOUNTER SYMPTOMS
HEADACHES: 0
VOMITING: 0
ENDOCRINE NEGATIVE: 1
NECK STIFFNESS: 0
SHORTNESS OF BREATH: 0
BACK PAIN: 0
LIGHT-HEADEDNESS: 0
WEAKNESS: 1
MUSCULOSKELETAL NEGATIVE: 1
WOUND: 0
MYALGIAS: 0
RHINORRHEA: 0
NECK PAIN: 0
NAUSEA: 0
FEVER: 0
SORE THROAT: 0
CARDIOVASCULAR NEGATIVE: 1
EYES NEGATIVE: 1
DIARRHEA: 0
ARTHRALGIAS: 0
DIZZINESS: 0
CHILLS: 0
ALLERGIC/IMMUNOLOGIC NEGATIVE: 1
RESPIRATORY NEGATIVE: 1
JOINT SWELLING: 0
WEAKNESS: 0
COUGH: 0
PALPITATIONS: 0

## 2022-07-29 NOTE — PROGRESS NOTES
"Chief Complaint:     No chief complaint on file.      No results found for any visits on 07/29/22.    Medical Decision Making:    Vital signs reviewed by Dudley Reyes PA-C  There were no vitals taken for this visit.    Differential Diagnosis:  { Differential Choices:347792}        ASSESSMENT    1. Essential hypertension        PLAN    Patient is in no acute distress.    Temp is *** in clinic today, lung sounds were clear, and O2 sats at ***% on RA.    RST was negative.  We will call with PCR results only if positive.  COVID swab collected in clinic today.  Rest, Push fluids, vaporizer, elevation of head of bed.  Ibuprofen and or Tylenol for any fever or body aches.  Over the counter cough suppressant- PRN- as discussed.   If symptoms worsen, recheck immediately otherwise follow up with your PCP in 1 week if symptoms are not improving.  Worrisome symptoms discussed with instructions to go to the ED.  Patient verbalized understanding and agreed with this plan.    Labs:    No results found for any visits on 07/29/22.     Vital signs reviewed by Dudley Reyes PA-C  There were no vitals taken for this visit.    Current Meds      Current Outpatient Medications:      Cholecalciferol (VITAMIN D) 50 MCG (2000 UT) CAPS, , Disp: , Rfl:      Cyanocobalamin (B-12) 100 MCG TABS, , Disp: , Rfl:      Fe Bisgly-Succ-C-Thre-B12-FA (IRON-150 PO), , Disp: , Rfl:      labetalol (NORMODYNE) 300 MG tablet, Take 1 tablet (300 mg) by mouth 2 times daily, Disp: 180 tablet, Rfl: 0     Multiple Vitamins-Minerals (MULTIVITAL PO), , Disp: , Rfl:      Zinc Sulfate (ZINC 15 PO), , Disp: , Rfl:       Respiratory History    occasional episodes of bronchitis      SUBJECTIVE    HPI: Tiffany Sanz is an 42 year old female who presents with {uri complaints:325447}.  Symptoms began {NUMBERS(MULTIPLE):111887}  {MONTH/WEEKS:664382::\"weeks\"} ago and has {CLINICAL COURSE - HISTORY:17}.  There is no {COUGH:845314}.  Patient is eating and drinking " well.  No fever, nausea, vomiting, or diarrhea.    Patient denies any recent travel or exposure to known COVID positive tested individual.      ROS:     Review of Systems   Constitutional: Negative for chills and fever.   HENT: Negative for congestion, ear pain, rhinorrhea and sore throat.    Eyes: Negative.    Respiratory: Negative.  Negative for cough and shortness of breath.    Cardiovascular: Negative.  Negative for chest pain and palpitations.   Gastrointestinal: Negative for diarrhea, nausea and vomiting.   Endocrine: Negative.    Genitourinary: Negative.    Musculoskeletal: Negative.  Negative for arthralgias, back pain, joint swelling, myalgias, neck pain and neck stiffness.   Skin: Negative.  Negative for rash and wound.   Allergic/Immunologic: Negative.  Negative for immunocompromised state.   Neurological: Negative for dizziness, weakness, light-headedness and headaches.         Family History   Family History   Adopted: Yes   Family history unknown: Yes   Problem Relation Age of Onset     Family history unknown: Yes     Substance Abuse Mother      Mental Illness Mother      Diabetes Paternal Grandfather      Heart Disease Paternal Grandfather      Heart Disease Maternal Aunt         Problem history  Patient Active Problem List   Diagnosis     CARDIOVASCULAR SCREENING; LDL GOAL LESS THAN 160     Morbid obesity (H)     Subserous leiomyoma of uterus     Hyperlipidemia     Genital herpes     Essential hypertension     Iron deficiency anemia due to chronic blood loss        Allergies  Allergies   Allergen Reactions     Sulfa Drugs Itching        Social History  Social History     Socioeconomic History     Marital status:      Spouse name: stephanie Hoover     Number of children: 3     Years of education: Not on file     Highest education level: Not on file   Occupational History     Occupation:    Tobacco Use     Smoking status: Never Smoker     Smokeless tobacco: Never Used   Vaping  Use     Vaping Use: Never used   Substance and Sexual Activity     Alcohol use: Not Currently     Drug use: No     Sexual activity: Yes     Partners: Male     Birth control/protection: Female Surgical   Other Topics Concern     Parent/sibling w/ CABG, MI or angioplasty before 65F 55M? No   Social History Narrative     Not on file     Social Determinants of Health     Financial Resource Strain: Not on file   Food Insecurity: Not on file   Transportation Needs: Not on file   Physical Activity: Not on file   Stress: Not on file   Social Connections: Not on file   Intimate Partner Violence: Not on file   Housing Stability: Not on file        OBJECTIVE     Vital signs reviewed by Dudley Reyes PA-C  There were no vitals taken for this visit.     Physical Exam  Vitals and nursing note reviewed.   Constitutional:       General: She is not in acute distress.     Appearance: She is well-developed. She is not ill-appearing, toxic-appearing or diaphoretic.   HENT:      Head: Normocephalic and atraumatic.      Right Ear: Hearing, tympanic membrane, ear canal and external ear normal. Tympanic membrane is not perforated, erythematous, retracted or bulging.      Left Ear: Hearing, tympanic membrane, ear canal and external ear normal. Tympanic membrane is not perforated, erythematous, retracted or bulging.      Nose: Congestion present. No mucosal edema or rhinorrhea.      Right Sinus: No maxillary sinus tenderness or frontal sinus tenderness.      Left Sinus: No maxillary sinus tenderness or frontal sinus tenderness.      Mouth/Throat:      Pharynx: No pharyngeal swelling, oropharyngeal exudate, posterior oropharyngeal erythema or uvula swelling.      Tonsils: No tonsillar exudate or tonsillar abscesses. 0 on the right. 0 on the left.   Eyes:      General:         Right eye: No discharge.         Left eye: No discharge.      Pupils: Pupils are equal, round, and reactive to light.   Cardiovascular:      Rate and Rhythm: Normal  rate and regular rhythm.      Heart sounds: Normal heart sounds. No murmur heard.    No friction rub. No gallop.   Pulmonary:      Effort: Pulmonary effort is normal. No respiratory distress.      Breath sounds: Normal breath sounds. No decreased breath sounds, wheezing, rhonchi or rales.   Chest:      Chest wall: No tenderness.   Abdominal:      General: Bowel sounds are normal. There is no distension.      Palpations: Abdomen is soft. There is no mass.      Tenderness: There is no abdominal tenderness. There is no guarding.   Musculoskeletal:      Cervical back: Normal range of motion and neck supple.   Lymphadenopathy:      Head:      Right side of head: No submental, submandibular, tonsillar, preauricular or posterior auricular adenopathy.      Left side of head: No submental, submandibular, tonsillar, preauricular or posterior auricular adenopathy.      Cervical: No cervical adenopathy.      Right cervical: No superficial or posterior cervical adenopathy.     Left cervical: No superficial or posterior cervical adenopathy.   Skin:     General: Skin is warm and dry.      Findings: No rash.   Neurological:      Mental Status: She is alert and oriented to person, place, and time.      Cranial Nerves: No cranial nerve deficit.      Deep Tendon Reflexes: Reflexes are normal and symmetric.   Psychiatric:         Behavior: Behavior normal. Behavior is cooperative.         Thought Content: Thought content normal.         Judgment: Judgment normal.           Dudley Reyes PA-C  7/29/2022, 3:03 PM

## 2022-07-29 NOTE — PROGRESS NOTES
"Chief Complaint:    Chief Complaint   Patient presents with     Hypertension     Tingling     Pt said that she felt tingling in her fingers and suddenly her right went weak.     Medical Decision Making:    Vital signs reviewed by Dudley Reyes PA-C  BP (!) 163/106 (BP Location: Left arm, Patient Position: Sitting, Cuff Size: Adult Large)   Pulse 70   Temp 97.1  F (36.2  C) (Tympanic)   Ht 1.664 m (5' 5.5\")   Wt 140.8 kg (310 lb 6.4 oz)   SpO2 98%   BMI 50.87 kg/m      Differential Diagnosis:  Stroke, HTN     ASSESSMENT:     1. Essential hypertension    2. Muscle weakness    3. Neuralgia           PLAN:     Patient is in no acute distress. Neuro exam was benign.  She is hypertensive with BP of 163/106.  With symptoms, I can not rule out acute stroke. Patient instructed to go to the ED now for further evaluation, lab work, and possible imaging and neuro consult.    Patient declined EMS transport.    Patient instructed to follow up with PCP in the next week.  Patient verbalized understanding and agreed with this plan.    Labs:     No results found for any visits on 07/29/22.    Current Meds:    Current Outpatient Medications:      Cholecalciferol (VITAMIN D) 50 MCG (2000 UT) CAPS, , Disp: , Rfl:      Fe Bisgly-Succ-C-Thre-B12-FA (IRON-150 PO), , Disp: , Rfl:      labetalol (NORMODYNE) 300 MG tablet, Take 1 tablet (300 mg) by mouth 2 times daily, Disp: 180 tablet, Rfl: 0     Multiple Vitamins-Minerals (MULTIVITAL PO), , Disp: , Rfl:      Cyanocobalamin (B-12) 100 MCG TABS, , Disp: , Rfl:      Zinc Sulfate (ZINC 15 PO), , Disp: , Rfl:     Allergies:  Allergies   Allergen Reactions     Sulfa Drugs Itching       SUBJECTIVE    HPI: Tiffany Sanz is an 42 year old female who presents for evaluation and treatment of HTN, tingling in her fingers and R sided weakness.  Patient began having some tingling in the fingers this afternoon.  She then began to have R sided weakness.  The symptoms have not changed.  She denies " any facial droop or slurring of words.  No headache, dizziness, nausea or vomiting.  No chest pain, palpitations, or SOB.  She has had some spots in her vision for the past week.      ROS:      Review of Systems   Constitutional: Negative for chills and fever.   HENT: Negative for congestion, ear pain, rhinorrhea and sore throat.    Eyes: Negative.    Respiratory: Negative.  Negative for cough and shortness of breath.    Cardiovascular: Negative.  Negative for chest pain and palpitations.   Gastrointestinal: Negative for diarrhea, nausea and vomiting.   Endocrine: Negative.    Genitourinary: Negative.    Musculoskeletal: Negative.  Negative for arthralgias, back pain, joint swelling, myalgias, neck pain and neck stiffness.   Skin: Negative.  Negative for rash and wound.   Allergic/Immunologic: Negative.  Negative for immunocompromised state.   Neurological: Positive for weakness. Negative for dizziness, light-headedness and headaches.        Tingling in Fingers.        Family History   Family History   Adopted: Yes   Family history unknown: Yes   Problem Relation Age of Onset     Family history unknown: Yes     Substance Abuse Mother      Mental Illness Mother      Diabetes Paternal Grandfather      Heart Disease Paternal Grandfather      Heart Disease Maternal Aunt        Social History  Social History     Socioeconomic History     Marital status:      Spouse name: stephanie Hoover     Number of children: 3     Years of education: Not on file     Highest education level: Not on file   Occupational History     Occupation:    Tobacco Use     Smoking status: Never Smoker     Smokeless tobacco: Never Used   Vaping Use     Vaping Use: Never used   Substance and Sexual Activity     Alcohol use: Not Currently     Drug use: No     Sexual activity: Yes     Partners: Male     Birth control/protection: Female Surgical   Other Topics Concern     Parent/sibling w/ CABG, MI or angioplasty before 65F  "55M? No   Social History Narrative     Not on file     Social Determinants of Health     Financial Resource Strain: Not on file   Food Insecurity: Not on file   Transportation Needs: Not on file   Physical Activity: Not on file   Stress: Not on file   Social Connections: Not on file   Intimate Partner Violence: Not on file   Housing Stability: Not on file        Surgical History:  Past Surgical History:   Procedure Laterality Date      SECTION  2017      SECTION        SECTION, TUBAL LIGATION, COMBINED       HC INSERT IMPLANTABLE CONTRACEPTIVE CAPSULE      Nexplanon     HC INSERTION INTRAUTERINE DEVICE      ParaGard     HC REMOVAL IMPLATABLE CONTRACEPTIVE CAPSULE       HC REMOVE INTRAUTERINE DEVICE  2017     INCISION/DRAIN ABSCESS EXTRA  2019    skin of right breast        Problem List:  Patient Active Problem List   Diagnosis     CARDIOVASCULAR SCREENING; LDL GOAL LESS THAN 160     Morbid obesity (H)     Subserous leiomyoma of uterus     Hyperlipidemia     Genital herpes     Essential hypertension     Iron deficiency anemia due to chronic blood loss           OBJECTIVE:     Vital signs noted and reviewed by Dudley Reyes PA-C  BP (!) 163/106 (BP Location: Left arm, Patient Position: Sitting, Cuff Size: Adult Large)   Pulse 70   Temp 97.1  F (36.2  C) (Tympanic)   Ht 1.664 m (5' 5.5\")   Wt 140.8 kg (310 lb 6.4 oz)   SpO2 98%   BMI 50.87 kg/m       PEFR:    Physical Exam  Vitals and nursing note reviewed.   Constitutional:       General: She is not in acute distress.     Appearance: She is well-developed. She is not ill-appearing, toxic-appearing or diaphoretic.   HENT:      Head: Normocephalic and atraumatic.      Right Ear: Tympanic membrane and external ear normal. No drainage, swelling or tenderness. Tympanic membrane is not perforated, erythematous, retracted or bulging.      Left Ear: Tympanic membrane and external ear normal. No drainage, swelling or tenderness. " Tympanic membrane is not perforated, erythematous, retracted or bulging.      Nose: No mucosal edema, congestion or rhinorrhea.      Right Sinus: No maxillary sinus tenderness or frontal sinus tenderness.      Left Sinus: No maxillary sinus tenderness or frontal sinus tenderness.      Mouth/Throat:      Pharynx: No pharyngeal swelling, oropharyngeal exudate, posterior oropharyngeal erythema or uvula swelling.      Tonsils: No tonsillar abscesses.   Eyes:      Pupils: Pupils are equal, round, and reactive to light.   Neck:      Trachea: Trachea normal.   Cardiovascular:      Rate and Rhythm: Normal rate and regular rhythm.      Heart sounds: Normal heart sounds, S1 normal and S2 normal. No murmur heard.    No friction rub. No gallop.   Pulmonary:      Effort: Pulmonary effort is normal. No respiratory distress.      Breath sounds: Normal breath sounds. No decreased breath sounds, wheezing, rhonchi or rales.   Abdominal:      General: Bowel sounds are normal. There is no distension.      Palpations: Abdomen is soft. Abdomen is not rigid. There is no mass.      Tenderness: There is no abdominal tenderness. There is no guarding or rebound.   Musculoskeletal:      Cervical back: Full passive range of motion without pain, normal range of motion and neck supple.   Lymphadenopathy:      Cervical: No cervical adenopathy.   Skin:     General: Skin is warm and dry.   Neurological:      Mental Status: She is alert and oriented to person, place, and time.      GCS: GCS eye subscore is 4. GCS verbal subscore is 5. GCS motor subscore is 6.      Cranial Nerves: No cranial nerve deficit or facial asymmetry.      Sensory: Sensation is intact. No sensory deficit.      Motor: Motor function is intact. No weakness, atrophy or abnormal muscle tone.      Coordination: Coordination is intact. Romberg sign negative. Coordination normal. Rapid alternating movements normal.      Gait: Gait is intact. Gait normal.      Deep Tendon Reflexes:  Reflexes are normal and symmetric.   Psychiatric:         Behavior: Behavior normal. Behavior is cooperative.         Thought Content: Thought content normal.         Judgment: Judgment normal.             Dudley Reyes PA-C  7/29/2022, 3:20 PM

## 2022-08-01 ENCOUNTER — TELEPHONE (OUTPATIENT)
Dept: FAMILY MEDICINE | Facility: CLINIC | Age: 42
End: 2022-08-01

## 2022-08-01 NOTE — TELEPHONE ENCOUNTER
Reason for Call:  Other appointment and call back    Detailed comments: Tiffany was at the ED this past weekend 7/30 and 7/31. She was there due to her blood pressure being high and stroke like symptoms. They wanted her seen as soon as possible to discuss her blood pressure. She wants to see Fernandez Bennett this week, she said he is her primary provider. Please call tiffany to discuss and possibly schedule an appointment as soon as possible.    Phone Number Patient can be reached at: Cell number on file:    Telephone Information:   Mobile [phone]       Best Time: Anytime ASAP    Can we leave a detailed message on this number? YES    Call taken on 8/1/2022 at 2:45 PM by Analia Posada

## 2022-08-02 NOTE — TELEPHONE ENCOUNTER
Called and spoke to the patient and scheduled a hospital follow appt ok per Donald for 8/4/2022.  Mery Jimenez [facility]  2nd Floor  Primary Care

## 2022-08-03 ENCOUNTER — OFFICE VISIT (OUTPATIENT)
Dept: OBGYN | Facility: CLINIC | Age: 42
End: 2022-08-03

## 2022-08-03 ENCOUNTER — TELEPHONE (OUTPATIENT)
Dept: OBGYN | Facility: CLINIC | Age: 42
End: 2022-08-03
Payer: COMMERCIAL

## 2022-08-03 ENCOUNTER — TELEPHONE (OUTPATIENT)
Dept: OBGYN | Facility: CLINIC | Age: 42
End: 2022-08-03

## 2022-08-03 VITALS
SYSTOLIC BLOOD PRESSURE: 124 MMHG | DIASTOLIC BLOOD PRESSURE: 75 MMHG | HEART RATE: 64 BPM | WEIGHT: 293 LBS | OXYGEN SATURATION: 99 % | BODY MASS INDEX: 50.21 KG/M2

## 2022-08-03 DIAGNOSIS — N88.2 CERVICAL STENOSIS (UTERINE CERVIX): ICD-10-CM

## 2022-08-03 DIAGNOSIS — N92.0 MENORRHAGIA WITH REGULAR CYCLE: Primary | ICD-10-CM

## 2022-08-03 PROCEDURE — 99213 OFFICE O/P EST LOW 20 MIN: CPT | Mod: 25 | Performed by: OBSTETRICS & GYNECOLOGY

## 2022-08-03 PROCEDURE — 58100 BIOPSY OF UTERUS LINING: CPT | Mod: 52 | Performed by: OBSTETRICS & GYNECOLOGY

## 2022-08-03 RX ORDER — MISOPROSTOL 200 UG/1
TABLET ORAL
Qty: 4 TABLET | Refills: 0 | Status: SHIPPED | OUTPATIENT
Start: 2022-08-03 | End: 2022-10-28

## 2022-08-03 NOTE — TELEPHONE ENCOUNTER
"This 43 y/o female,  ( followed by 2 C/Ss), LMP 2022, s/p tubal ligation, presents for an endometrial biopsy today since tissue sampling is needed for evaluation of her c/o menorrhagia.  A recent pelvic US on 2022 demonstrated an enlarged uterus measuring 11.5 x 8.1 x 9.5 cm with multiple intramural fibroids coupled with a 14 mm endometrial stripe.  Informed consent was reviewed and obtained for today's procedure but she had not taken any pain medication beforehand.  A UPT was not needed since she is s/p tubal ligation for sterilization.   Last menstrual period 2022, not currently breastfeeding.  ROS:  10 systems were reviewed and the positives were listed under problems  In the dorsal lithotomy position, a blue bi-valve speculum was placed and her cervix was cleansed with betadine x 3 swabs.  The 12 o'clock position of her cervix was grasped with a single-toothed tenaculum and the internal cervical os was dilated using an OsFinder due to stenosis issues.  However, she did not tolerate this very well and asked me to stop for several minutes while she recomposed herself.  When she consented again, I tried to pass the pipelle but it kept bending due to severe cervical stenosis.  The patient felt \"done\" so the procedure was ended but I had not been able to obtain the needed endometrial tissue.  Therefore, we discussed a different approach with use of MAC for comfort at the [facility] in the [place] for a hysteroscopic approach.  This procedure and the advantages, options, and risks were reviewed.  The patient would like to proceed so the surgery worksheet was sent to the .  Please note the patient's hx of MRSA, class 3 obesity (BMI 50.21), and hypertension.  The pt states that her bariatric surgeon plans to schedule her for a gastric sleeve procedure but only after her hgb value normalizes.  This means that she will need treatment for menorrhagia due to multiple fibroids but this cannot be " started until tissue sampling has been completed.  Assessment - Menorrhagia felt to be due to multiple fibroids, anemia, thickening of the endometrial stripe per ultrasound, and severe cervical stenosis  Plan - Today's endometrial biopsy procedure failed so will schedule her for operative hysteroscopy using MyoSure, polypectomy, and D&C at the [facility] OR due to her excessive BMI of 50.21 since the cutoff for SDS is a BMI of 45.  Instructions on preoperative use of Cytotec were reviewed with the patient and this script was sent to her listed pharmacy.  20 minutes were spent today in chart review, the patient visit, review of tests, and documentation in regard to the above issues.  This did NOT include the time spent in the procedure (failed endometrial biopsy attempt due to severe cervical stenosis).

## 2022-08-03 NOTE — PATIENT INSTRUCTIONS
If you have any questions regarding your visit, Please contact your care team.    [facility]: [phone]    To Schedule an Appointment 24/7  Call: [phone]ONE [facility]   Mary Jane Bojorquez DO.    FRANTZ Padron -Surgery Scheduler  Joanna - Surgery Scheduler    Inez, REA Meraz, RN  Lima, REA   [place]  Wednesday and Friday  8:30 a.m-5:00 p.m    [place]  Monday 8:30 a.m-5:00 p.m  Typical Surgery day:  Tuesday [facility]  [address] [place] [address] [phone] Phone  [phone] Fax    Imaging Scheduling-All Locations [phone]    [facility]  [address] [place] [address]     Urgent Care locations:  [facility] Monday-Friday   10 am - 8 pm  Saturday and Sunday   9 am - 5 pm [phone]   **Surgeries** Our Surgery Schedulers will contact you to schedule. If you do not receive a call within 3 business days, please call [phone].    [facility] Labor and Delivery:  [phone]    If you need a medication refill, please contact your pharmacy. Please allow 3 business days for your refill to be completed.  As always, Thank you for trusting us with your healthcare needs!  see additional instructions from your care team below

## 2022-08-03 NOTE — TELEPHONE ENCOUNTER
Mary Jane Bojorquez DO     MM    8/3/22 12:28 PM  Unsigned Note  [facility] SURGERY PLANNING/SCHEDULING WORKSHEET                                                 Tiffany Sanz               :  1980  MRN:  [id number]  Home Phone [phone]   Work Phone Not on file.   Mobile [phone]       Surgeon: Mary Jane Bojorquez DO     DIAGNOSIS: Menorrhagia with known fibroid uterus, thickened endometrial stripe per ultrasound, anemia, and severe cervical stenosis     SURGICAL PROCEDURE: Exam under anesthesia, operative hysteroscopy using MyoSure, polypectomy, and dilatation and curettage     Surgery Location:  [facility]  Patient Surgery Class:  SDS  Length of Procedure:  60 minutes  Type of anesthesia:  MAC     Multi-surgeon case: No  OR Assistant needed: Yes  Vendor needed: Yes  Positioning:  Lithotomy  Laterality:  NA  Date requested: At a time that the patient is not bleeding     Special Equipment: MyoSure  Special Instructions for patient:  Per the [facility] Pre-Admission Nurse when they call the patient prior to the surgery date.   Precautions:  Hypertension and class 3 obesity (BMI 50.21)  :  NOT NEEDED     Sterilization consent:  Not applicable to procedure being performed.     Preop: Pre-op options: PCP  Pre-surgery consult needed:  Not applicable.  Postop evaluation needed:  2 weeks     ALLERGIES:        Allergies   Allergen Reactions     Sulfa Drugs Itching      BMI: 50.21 so unable to perform her surgery in the [facility] since their BMI cutoff is 45.     The proposed surgical procedure is considered INTERMEDIATE risk.   Patient has a history of MRSA   Informed consent has been reviewed and obtained for the above listed procedures and also for a blood transfusion if emergently necessary.     Due to severe cervical stenosis issues, she has been advised to use Cytotec preoperatively and instructions were discussed with the patient.  I have sent this script to  her listed pharmacy.     Mary Jane Bojorquez DO  FACOG, FACS    8/3/2022      SURGERY SCHEDULING AND PRECERTIFICATION    Medical Record Number: [id number]  Tiffany Sanz  YOB: 1980   Phone: [phone] (home)   Primary Provider: No Ref-Primary, Physician    Reason for Admit: : Menorrhagia with known fibroid uterus, thickened endometrial stripe per ultrasound, anemia, and severe cervical stenosis    ICD-10 CODE:  D 25.9 N 88.2 N 92.0  Surgeon: Mary Jane Bojorquez DO   Surgical Procedure: Exam under anesthesia, operative hysteroscopy using MyoSure, polypectomy, and dilatation and curettage    Date of Surgery 9/20/22 Time of Surgery 11:00 am [facility] wanted to move her surgery time up to earlier in the morning to 8:00, patient was notified of the new arrival time of 6:30 a.m  Surgery to be performed at:  [facility]  Status: Outpatient  Type of Anesthesia Anticipated: MAC    Sterilization consent:  Not applicable to procedure being performed.    Pre-Op: On 9/16/22 with Dr. Mai in [place]  at 3:10 pm   COVID testing:  Covid testing is not required because the patient has been vaccinated, is not immunocompromised and does not have Covid symptoms per [facility] guidelines.   Post-Op:  2 weeks on 10/5/22 with Dr. Bojorquez  at 8:30 am     Pre-certification routed to Financial Counselors:  Yes    Surgery packet mailed to patient's home address: Yes  Patient instructed NPO 12 hours prior to surgery, arrive according to the time the nurse gives patient when called prior to surgery, must have a .  Patient understood and agrees to the plan.      Requestor:  Matilde Carrasco     Location:  [place] [phone]

## 2022-08-03 NOTE — TELEPHONE ENCOUNTER
[facility] SURGERY PLANNING/SCHEDULING WORKSHEET                                                 Tiffany Sanz               :  1980  MRN:  [id number]  Home Phone [phone]   Work Phone Not on file.   Mobile [phone]       Surgeon: Mary Jane Bojorquez DO    DIAGNOSIS: Menorrhagia with known fibroid uterus, thickened endometrial stripe per ultrasound, anemia, and severe cervical stenosis    SURGICAL PROCEDURE: Exam under anesthesia, operative hysteroscopy using MyoSure, polypectomy, and dilatation and curettage    Surgery Location:  [facility]  Patient Surgery Class:  SDS  Length of Procedure:  60 minutes  Type of anesthesia:  MAC    Multi-surgeon case: No  OR Assistant needed: Yes  Vendor needed: Yes  Positioning:  Lithotomy  Laterality:  NA  Date requested: At a time that the patient is not bleeding    Special Equipment: MyoSure  Special Instructions for patient:  Per the [facility] Pre-Admission Nurse when they call the patient prior to the surgery date.   Precautions:  Hypertension and class 3 obesity (BMI 50.21)  :  NOT NEEDED    Sterilization consent:  Not applicable to procedure being performed.    Preop: Pre-op options: PCP  Pre-surgery consult needed:  Not applicable.  Postop evaluation needed:  2 weeks    ALLERGIES:   Allergies   Allergen Reactions     Sulfa Drugs Itching      BMI: 50.21 so unable to perform her surgery in the [facility] since their BMI cutoff is 45.    The proposed surgical procedure is considered INTERMEDIATE risk.    Informed consent has been reviewed and obtained for the above listed procedures and also for a blood transfusion if emergently necessary.    Due to severe cervical stenosis issues, she has been advised to use Cytotec preoperatively and instructions were discussed with the patient.  I have sent this script to her listed pharmacy.    Mary Jane Bojorquez DO  FACOG, FACS    8/3/2022

## 2022-08-04 ENCOUNTER — OFFICE VISIT (OUTPATIENT)
Dept: FAMILY MEDICINE | Facility: CLINIC | Age: 42
End: 2022-08-04
Payer: COMMERCIAL

## 2022-08-04 VITALS
WEIGHT: 293 LBS | HEIGHT: 66 IN | HEART RATE: 75 BPM | RESPIRATION RATE: 24 BRPM | TEMPERATURE: 97.4 F | BODY MASS INDEX: 47.09 KG/M2 | SYSTOLIC BLOOD PRESSURE: 134 MMHG | OXYGEN SATURATION: 98 % | DIASTOLIC BLOOD PRESSURE: 79 MMHG

## 2022-08-04 DIAGNOSIS — I10 ESSENTIAL HYPERTENSION: Primary | ICD-10-CM

## 2022-08-04 DIAGNOSIS — H53.9 VISION CHANGES: ICD-10-CM

## 2022-08-04 PROCEDURE — 99213 OFFICE O/P EST LOW 20 MIN: CPT | Performed by: PHYSICIAN ASSISTANT

## 2022-08-04 RX ORDER — HYDROCHLOROTHIAZIDE 25 MG/1
1 TABLET ORAL DAILY
COMMUNITY
Start: 2022-07-29 | End: 2022-08-29

## 2022-08-04 ASSESSMENT — PAIN SCALES - GENERAL: PAINLEVEL: NO PAIN (0)

## 2022-08-04 NOTE — PROGRESS NOTES
Assessment & Plan  doing pretty well today. Continue current regimen  Problem List Items Addressed This Visit     Essential hypertension - Primary      Other Visit Diagnoses     Vision changes        Relevant Orders    Adult Eye Referral           Review of external notes as documented elsewhere in note    21 minutes spent on the date of the encounter doing chart review, history and exam, documentation and further activities per the note  {     Return in about 6 months (around 2/4/2023) for PCP Follow-up.    NEGAR Garcia  [facility]    Subjective     HPI   Seen in ED for below and elev BP x 2, had negative brain MRI the 2nd time, referred to Neuro   and started on hydrochlorothiazide. Hasn't seen opto in a long time.   Dizziness  Onset/Duration: 7/29/2022  Description:   Do you feel faint: No  Does it feel like the surroundings (bed, room) are moving: No  Unsteady/off balance: YES, no clear triggers, not orthostatic, generally resolved  Have you passed out or fallen: No  Intensity: mild  Progression of Symptoms: same  Accompanying Signs & Symptoms:  Heart palpitations or chest pain: No  Nausea, vomiting: No  Weakness or lack of coordination in arms or legs: YES- in L arm  Vision or speech changes: YES- seeing spots   Numbness or tingling: YES- improved  Ringing in ears (Tinnitus): No  Hearing Loss: No  History:   Head trauma/concussion history: No  Previous similar symptoms: No  Recent bleeding history: No  Any new medications (BP?): YES- from ED  Precipitating factors:   Worse with activity: No  Worse with head movement: YES  Alleviating factors:   Does staying in a fixed position give relief: YES  Therapies tried and outcome: None    In pprocess for getting gastric sleeve.  They found fibroids in trying to explain her anemia.  GYN attempted  D&C yesterday but patient was too uncomfortable.  They will repeat under sedation.      Review of Systems   Cardio HTN as above     "  Objective    /79 (BP Location: Left arm, Patient Position: Sitting, Cuff Size: Adult Large)   Pulse 75   Temp 97.4  F (36.3  C) (Tympanic)   Resp 24   Ht 1.664 m (5' 5.5\")   Wt 138.9 kg (306 lb 3.2 oz)   LMP 07/06/2022 (Approximate)   SpO2 98%   BMI 50.18 kg/m      Physical Exam   GENERAL: healthy, alert and no distress  HENT: ear canals and TM's normal, nose and mouth without ulcers or lesions        "

## 2022-08-16 NOTE — TELEPHONE ENCOUNTER
[facility] wanted to move her surgery time up to earlier in the morning to 8:00, patient was notified of the new arrival time of 6:30 a.m.    Joanna Pablo  /Surgery Scheduler

## 2022-08-16 NOTE — TELEPHONE ENCOUNTER
"PB DOS: 9/20/2022  Type of Procedure: Exam under anesthesia, operative hysteroscopy using MyoSure, polypectomy, and dilatation and curettage  CPT Codes: 41127  ICD10 Codes:  D 25.9 N 88.2 N 92.0  Surgeon/Ordering provider: Mary Jane Bojorquez DO   Pre-cert/Authorization completed:  no PA required   Payer[facility]  [facility]      [facility] and [facility] visit [place] [facility]    Prior authorization(PA) or \"no PA required\" is not a guarantee for coverage.  Please advise the patient to contact insurance for their specific plan benefits.       NAGI Quintanilla  Financial Counselor  [facility]  [address] [place] [address]  [phone]    "

## 2022-08-26 ENCOUNTER — MYC MEDICAL ADVICE (OUTPATIENT)
Dept: FAMILY MEDICINE | Facility: CLINIC | Age: 42
End: 2022-08-26

## 2022-08-26 DIAGNOSIS — I10 ESSENTIAL HYPERTENSION: Primary | ICD-10-CM

## 2022-08-30 RX ORDER — HYDROCHLOROTHIAZIDE 25 MG/1
25 TABLET ORAL DAILY
Qty: 90 TABLET | Refills: 0 | Status: SHIPPED | OUTPATIENT
Start: 2022-08-30 | End: 2022-12-09

## 2022-08-30 NOTE — TELEPHONE ENCOUNTER
Patient called to clinic regarding refill request.  Hydrochlorothiazide was prescribed while in the ED on 7/29 and was told by primary care to continue taking this medication.  Patient is out of medication, has been out x 3 days now and needs refilled ASAP.  Send to [facility] in [place].    Routing to provider to address, medication is noted as historical so fails refill protocol.      Amanda Alexander RN  [facility]-Primary Care

## 2022-09-16 ENCOUNTER — OFFICE VISIT (OUTPATIENT)
Dept: FAMILY MEDICINE | Facility: CLINIC | Age: 42
End: 2022-09-16
Payer: COMMERCIAL

## 2022-09-16 VITALS
HEART RATE: 78 BPM | BODY MASS INDEX: 45.99 KG/M2 | DIASTOLIC BLOOD PRESSURE: 78 MMHG | SYSTOLIC BLOOD PRESSURE: 126 MMHG | TEMPERATURE: 97.4 F | HEIGHT: 67 IN | RESPIRATION RATE: 20 BRPM | WEIGHT: 293 LBS | OXYGEN SATURATION: 99 %

## 2022-09-16 DIAGNOSIS — Z01.818 PRE-OPERATIVE EXAMINATION: Primary | ICD-10-CM

## 2022-09-16 DIAGNOSIS — E66.01 MORBID OBESITY (H): ICD-10-CM

## 2022-09-16 DIAGNOSIS — N92.0 MENORRHAGIA WITH REGULAR CYCLE: ICD-10-CM

## 2022-09-16 DIAGNOSIS — D50.0 IRON DEFICIENCY ANEMIA DUE TO CHRONIC BLOOD LOSS: ICD-10-CM

## 2022-09-16 DIAGNOSIS — I10 ESSENTIAL HYPERTENSION: ICD-10-CM

## 2022-09-16 LAB
ANION GAP SERPL CALCULATED.3IONS-SCNC: 5 MMOL/L (ref 3–14)
BUN SERPL-MCNC: 9 MG/DL (ref 7–30)
CALCIUM SERPL-MCNC: 9.4 MG/DL (ref 8.5–10.1)
CHLORIDE BLD-SCNC: 103 MMOL/L (ref 94–109)
CO2 SERPL-SCNC: 30 MMOL/L (ref 20–32)
CREAT SERPL-MCNC: 0.79 MG/DL (ref 0.52–1.04)
CREAT UR-MCNC: 246 MG/DL
ERYTHROCYTE [DISTWIDTH] IN BLOOD BY AUTOMATED COUNT: 14.8 % (ref 10–15)
GFR SERPL CREATININE-BSD FRML MDRD: >90 ML/MIN/1.73M2
GLUCOSE BLD-MCNC: 111 MG/DL (ref 70–99)
HCT VFR BLD AUTO: 36.8 % (ref 35–47)
HGB BLD-MCNC: 11.4 G/DL (ref 11.7–15.7)
MCH RBC QN AUTO: 26.3 PG (ref 26.5–33)
MCHC RBC AUTO-ENTMCNC: 31 G/DL (ref 31.5–36.5)
MCV RBC AUTO: 85 FL (ref 78–100)
MICROALBUMIN UR-MCNC: 20 MG/L
MICROALBUMIN/CREAT UR: 8.13 MG/G CR (ref 0–25)
PLATELET # BLD AUTO: 177 10E3/UL (ref 150–450)
POTASSIUM BLD-SCNC: 3.7 MMOL/L (ref 3.4–5.3)
RBC # BLD AUTO: 4.33 10E6/UL (ref 3.8–5.2)
SODIUM SERPL-SCNC: 138 MMOL/L (ref 133–144)
WBC # BLD AUTO: 6.4 10E3/UL (ref 4–11)

## 2022-09-16 PROCEDURE — 36415 COLL VENOUS BLD VENIPUNCTURE: CPT | Performed by: PREVENTIVE MEDICINE

## 2022-09-16 PROCEDURE — 85027 COMPLETE CBC AUTOMATED: CPT | Performed by: PREVENTIVE MEDICINE

## 2022-09-16 PROCEDURE — 82043 UR ALBUMIN QUANTITATIVE: CPT | Performed by: PREVENTIVE MEDICINE

## 2022-09-16 PROCEDURE — 99214 OFFICE O/P EST MOD 30 MIN: CPT | Performed by: PREVENTIVE MEDICINE

## 2022-09-16 PROCEDURE — 80048 BASIC METABOLIC PNL TOTAL CA: CPT | Performed by: PREVENTIVE MEDICINE

## 2022-09-16 ASSESSMENT — PAIN SCALES - GENERAL: PAINLEVEL: NO PAIN (0)

## 2022-09-16 NOTE — PROGRESS NOTES
[address] [address]  Phone: [phone]  Primary Provider: No Ref-Primary, Physician  Pre-op Performing Provider: JEB MILIAN      PREOPERATIVE EVALUATION:  Today's date: 9/16/2022    Tiffany Sanz is a 42 year old female who presents for a preoperative evaluation.    Surgical Information:  Surgery/Procedure: exam under anesthesia operative hysteroscopy using myosure, polypectomy, D&C  Surgery Location: [facility] Operating Room  Surgeon: Mary Jane Bojorquez DO  Surgery Date: 09/20/2022  Time of Surgery: 09:15  Where patient plans to recover: At home with family  Fax number for surgical facility: Note does not need to be faxed, will be available electronically in Epic.    Type of Anesthesia Anticipated: General     Assessment & Plan     The proposed surgical procedure is considered INTERMEDIATE risk.    Pre-operative examination  -procedure is scheduled for 9/20/22    Menorrhagia with regular cycle  -has been anemic in the past  -await labs   - has stopped iron supplements   - CBC with platelets    Morbid obesity (H)  Wt Readings from Last 2 Encounters:   09/16/22 140.3 kg (309 lb 6 oz)   08/04/22 138.9 kg (306 lb 3.2 oz)       Essential hypertension  -at goal   - Albumin Random Urine Quantitative with Creat Ratio  - Basic metabolic panel    Iron deficiency anemia due to chronic blood loss  -Undergoing procedure to address anemia from heavy vaginal bleeding       Possible Sleep Apnea: Yes,    STOP-Bang Total Score 9/16/2022   Total Score 3   Risk Stratification 3 - 4: Moderate Risk for JAYME          Risks and Recommendations:  The patient has the following additional risks and recommendations for perioperative complications:   - Morbid obesity (BMI >40)  Obstructive Sleep Apnea:   -possible sleep apnea, Moderate risk, no prior sleep evaluation, monitor oxygen levels closely during procedure   Anemia/Bleeding/Clotting:    - Anemia  and does not require treatment prior to surgery. Monitor hemoglobin postoperatively    Medication Instructions:  Patient is to take all scheduled medications on the day of surgery EXCEPT for modifications listed below:   - Beta Blockers: Continue taking on the day of surgery.   - Diuretics: HOLD on the day of surgery.    RECOMMENDATION:  APPROVAL GIVEN to proceed with proposed procedure, without further diagnostic evaluation.      25 minutes spent on the date of the encounter doing chart review, history and exam, documentation and further activities per the note        Subjective     HPI related to upcoming procedure: 42 year old female with menorrhagia and resultant anemia. Ultrasound of the Pelvis showed an endometrial stripe of 14 mm thickness and multiple uterine fibroids.       Preop Questions 9/16/2022   1. Have you ever had a heart attack or stroke? No   2. Have you ever had surgery on your heart or blood vessels, such as a stent placement, a coronary artery bypass, or surgery on an artery in your head, neck, heart, or legs? No   3. Do you have chest pain with activity? No   4. Do you have a history of  heart failure? No   5. Do you currently have a cold, bronchitis or symptoms of other infection? No   6. Do you have a cough, shortness of breath, or wheezing? No   7. Do you or anyone in your family have previous history of blood clots? No   8. Do you or does anyone in your family have a serious bleeding problem such as prolonged bleeding following surgeries or cuts? YES - Patient with heavy periods    9. Have you ever had problems with anemia or been told to take iron pills? YES - being treated for this    10. Have you had any abnormal blood loss such as black, tarry or bloody stools, or abnormal vaginal bleeding? YES - heavy periods    11. Have you ever had a blood transfusion? YES -after first delivery    11a. Have you ever had a transfusion reaction? No   12. Are you willing to have a blood transfusion if  it is medically needed before, during, or after your surgery? Yes   13. Have you or any of your relatives ever had problems with anesthesia? UNKNOWN - Not sure    14. Do you have sleep apnea, excessive snoring or daytime drowsiness? UNKNOWN - Snoring+   15. Do you have any artifical heart valves or other implanted medical devices like a pacemaker, defibrillator, or continuous glucose monitor? No   16. Do you have artificial joints? No   17. Are you allergic to latex? YES: Rash with Latex    18. Is there any chance that you may be pregnant? No     Health Care Directive:  Patient does not have a Health Care Directive or Living Will: Discussed advance care planning with patient; however, patient declined at this time.    Preoperative Review of :   reviewed - no record of controlled substances prescribed.      Status of Chronic Conditions:  See problem list for active medical problems.  Problems all longstanding and stable, except as noted/documented.  See ROS for pertinent symptoms related to these conditions.    HYPERTENSION - Patient has longstanding history of HTN , currently denies any symptoms referable to elevated blood pressure. Specifically denies chest pain, palpitations, dyspnea, orthopnea, PND or peripheral edema. Blood pressure readings have been in normal range. Current medication regimen is as listed below. Patient denies any side effects of medication.       Review of Systems  CONSTITUTIONAL: NEGATIVE for fever, chills, change in weight  INTEGUMENTARY/SKIN: NEGATIVE for worrisome rashes, moles or lesions  EYES: NEGATIVE for vision changes or irritation  ENT/MOUTH: NEGATIVE for ear, mouth and throat problems  RESP: NEGATIVE for significant cough or SOB  CV: NEGATIVE for chest pain, palpitations or peripheral edema  GI: NEGATIVE for nausea, abdominal pain, heartburn, or change in bowel habits  MUSCULOSKELETAL: NEGATIVE for significant arthralgias or myalgia  NEURO: NEGATIVE for weakness, dizziness or  paresthesias  ENDOCRINE: NEGATIVE for temperature intolerance, skin/hair changes  HEME: NEGATIVE for bleeding problems  PSYCHIATRIC: NEGATIVE for changes in mood or affect    Patient Active Problem List    Diagnosis Date Noted     Iron deficiency anemia due to chronic blood loss 2021     Priority: Medium     Essential hypertension 2020     Priority: Medium     Subserous leiomyoma of uterus 2019     Priority: Medium     Hyperlipidemia 2019     Priority: Medium     Morbid obesity (H) 2018     Priority: Medium     CARDIOVASCULAR SCREENING; LDL GOAL LESS THAN 160 10/31/2010     Priority: Medium     Genital herpes 1999     Priority: Medium      Past Medical History:   Diagnosis Date     Arthritis      Chlamydia 2016     Essential hypertension 2020     Genital herpes      Gestational diabetes mellitus (GDM) in third trimester      History of transfusion of packed RBC      Hyperlipidemia 3/21/2019    Overview:  Created by Conversion     Morbid obesity (H) 2018     Subserous leiomyoma of uterus 3/21/2019     Past Surgical History:   Procedure Laterality Date      SECTION        SECTION        SECTION, TUBAL LIGATION, COMBINED       HC INSERT IMPLANTABLE CONTRACEPTIVE CAPSULE      Nexplanon     HC INSERTION INTRAUTERINE DEVICE      ParaGard     HC REMOVAL IMPLATABLE CONTRACEPTIVE CAPSULE       HC REMOVE INTRAUTERINE DEVICE  2017     INCISION/DRAIN ABSCESS EXTRA  2019    skin of right breast     Current Outpatient Medications   Medication Sig Dispense Refill     Cholecalciferol (VITAMIN D) 50 MCG ( UT) CAPS        Fe Bisgly-Succ-C-Thre-B12-FA (IRON-150 PO)        hydrochlorothiazide (HYDRODIURIL) 25 MG tablet Take 1 tablet (25 mg) by mouth daily 90 tablet 0     labetalol (NORMODYNE) 300 MG tablet TAKE 1 TABLET(300 MG) BY MOUTH TWICE DAILY 180 tablet 0     Multiple Vitamins-Minerals (MULTIVITAL PO)        misoprostol (CYTOTEC)  "200 MCG tablet Insert 2 tablets intravaginally and push up towards the cervix the evening prior to surgery, then repeat with 2 new tablets the morning of surgery. 4 tablet 0       Allergies   Allergen Reactions     Sulfa Drugs Itching        Social History     Tobacco Use     Smoking status: Never Smoker     Smokeless tobacco: Never Used   Substance Use Topics     Alcohol use: Not Currently     Family History   Adopted: Yes   Family history unknown: Yes   Problem Relation Age of Onset     Family history unknown: Yes     Substance Abuse Mother      Mental Illness Mother      Diabetes Paternal Grandfather      Heart Disease Paternal Grandfather      Heart Disease Maternal Aunt      History   Drug Use No         Objective     /78 (BP Location: Left arm, Patient Position: Sitting, Cuff Size: Adult Large)   Pulse 78   Temp 97.4  F (36.3  C) (Tympanic)   Resp 20   Ht 1.694 m (5' 6.69\")   Wt 140.3 kg (309 lb 6 oz)   LMP 08/16/2022 (Exact Date)   SpO2 99%   BMI 48.90 kg/m      Physical Exam  GENERAL APPEARANCE: healthy, alert and no distress  EYES: Eyes grossly normal to inspection and conjunctivae and sclerae normal  HENT: nose and mouth without ulcers or lesions  NECK: no adenopathy and trachea midline and normal to palpation  RESP: lungs clear to auscultation - no rales, rhonchi or wheezes  CV: regular rates and rhythm, normal S1 S2, no S3 or S4 and no murmur, click or rub  ABDOMEN: soft, non-tender and no rebound or guarding   MS: extremities normal- no gross deformities noted and peripheral pulses normal  SKIN: no suspicious lesions or rashes  NEURO: Normal strength and tone, mentation intact and speech normal  PSYCH: mentation appears normal      Recent Labs   Lab Test 04/22/22  1040 02/16/22  1201 09/21/21  1342 06/11/21  1031   HGB  --  11.0* 10.9* 9.1*   PLT  --  211 167 228     --   --  139   POTASSIUM 4.2  --   --  4.0   CR 0.89  --   --  0.80   A1C  --   --   --  5.5        Diagnostics:  Labs " pending at this time.  Results will be reviewed when available.   No EKG required, no history of coronary heart disease, significant arrhythmia, peripheral arterial disease or other structural heart disease.    Revised Cardiac Risk Index (RCRI):  The patient has the following serious cardiovascular risks for perioperative complications:   - No serious cardiac risks = 0 points     RCRI Interpretation: 0 points: Class I (very low risk - 0.4% complication rate)           Signed Electronically by: Estefania Berrios MD MPH    Copy of this evaluation report is provided to requesting physician.

## 2022-09-16 NOTE — RESULT ENCOUNTER NOTE
Tiffany,     Basic blood count is showing stable anemia, other labs are pending.     Please do not hesitate to call us at [phone] if you have any questions or concerns.    Thank you,    Estefania Berrios MD MPH

## 2022-09-21 NOTE — RESULT ENCOUNTER NOTE
Tiffany,     Urine sample is not showing any abnormal protein.  Electrolytes, non fasting glucose and kidney function are normal.     Please do not hesitate to call us at [phone] if you have any questions or concerns.    Thank you,    Estefania Berrios MD MPH

## 2022-09-26 ENCOUNTER — TELEPHONE (OUTPATIENT)
Dept: OBGYN | Facility: CLINIC | Age: 42
End: 2022-09-26

## 2022-09-26 NOTE — TELEPHONE ENCOUNTER
I called this patient with her recent pathology report and she is doing fine.  Benign endocervical and endometrial polyps were noted and she voiced understanding.  She is to keep her 2-week postop check on 1/5/2022 or call/return earlier prn.

## 2022-10-05 ENCOUNTER — TELEPHONE (OUTPATIENT)
Dept: OBGYN | Facility: CLINIC | Age: 42
End: 2022-10-05

## 2022-10-05 NOTE — TELEPHONE ENCOUNTER
Pt seen by [place] 9/20/22 for HYSTEROSCOPY,W/ENDO BX  EXAM UNDER ANESTHESIA OPERATIVE HYSTEROSCOPY USING MYOSURE, POLYPECTOMY, D&C      Bleeding had stopped since procedure, menstrual cycle started now but is early and heavier than usual. She had a quarter size clot. Changes pad every few hours. No fever or discharge. No abdominal pain, felt like pressure a few days ago, but normal now. Denies dizziness and SOB.    Gave precautions for heavy bleeding and concerning symptoms, when to be seen in ER. Pt will monitor at home for now and advised we would work to get her back on schedule for her follow-up.    Routing to MA pool for scheduling.    Mariya Person RN on 10/5/2022 at 10:16 AM

## 2022-10-05 NOTE — TELEPHONE ENCOUNTER
Working with Dr. Bojorquez's scheduling team to see when patient should be rescheduled for Post-op appointment due to Dr. Bojorquez being out of office this week unexpectedly. Patient aware.    Nereida LANDRY 10/5/2022 10:52 AM

## 2022-10-05 NOTE — TELEPHONE ENCOUNTER
[facility] Call Center    Phone Message    May a detailed message be left on voicemail: yes     Reason for Call: Pt missed her post op appointment today.  She would like to reschedule but next available is not until 11/23/22.  Pt said that she is due now for the post op.  Please call her back to schedule.  Thanks.    Action Taken: Message routed to:  [facility] p [phone]    Travel Screening: Not Applicable

## 2022-10-05 NOTE — TELEPHONE ENCOUNTER
Left patient a message that  can see her tomorrow at 2:30 in [place] for her post op appointment.  Janice Duncan CMA

## 2022-10-05 NOTE — TELEPHONE ENCOUNTER
Patient returned call and is concerned that she is still passing blood clots after surgery. Had post op with  canceled and needs to be rescheduled. Please advise how soon patient needs to be seen if she is still clotting from surgery on 9/20.  Janice Duncan CMA'

## 2022-10-06 ENCOUNTER — OFFICE VISIT (OUTPATIENT)
Dept: OBGYN | Facility: CLINIC | Age: 42
End: 2022-10-06
Payer: COMMERCIAL

## 2022-10-06 ENCOUNTER — TELEPHONE (OUTPATIENT)
Dept: OBGYN | Facility: CLINIC | Age: 42
End: 2022-10-06

## 2022-10-06 VITALS
DIASTOLIC BLOOD PRESSURE: 89 MMHG | HEART RATE: 85 BPM | WEIGHT: 293 LBS | BODY MASS INDEX: 49.06 KG/M2 | SYSTOLIC BLOOD PRESSURE: 147 MMHG

## 2022-10-06 DIAGNOSIS — N93.9 ABNORMAL UTERINE BLEEDING (AUB): ICD-10-CM

## 2022-10-06 DIAGNOSIS — Z98.890 POSTOPERATIVE STATE: Primary | ICD-10-CM

## 2022-10-06 DIAGNOSIS — N93.9 ABNORMAL UTERINE BLEEDING (AUB): Primary | ICD-10-CM

## 2022-10-06 LAB
ERYTHROCYTE [DISTWIDTH] IN BLOOD BY AUTOMATED COUNT: 15.3 % (ref 10–15)
HCT VFR BLD AUTO: 35.9 % (ref 35–47)
HGB BLD-MCNC: 11.2 G/DL (ref 11.7–15.7)
MCH RBC QN AUTO: 26.8 PG (ref 26.5–33)
MCHC RBC AUTO-ENTMCNC: 31.2 G/DL (ref 31.5–36.5)
MCV RBC AUTO: 86 FL (ref 78–100)
PLATELET # BLD AUTO: 184 10E3/UL (ref 150–450)
RBC # BLD AUTO: 4.18 10E6/UL (ref 3.8–5.2)
WBC # BLD AUTO: 8.1 10E3/UL (ref 4–11)

## 2022-10-06 PROCEDURE — 85027 COMPLETE CBC AUTOMATED: CPT | Performed by: OBSTETRICS & GYNECOLOGY

## 2022-10-06 PROCEDURE — 36415 COLL VENOUS BLD VENIPUNCTURE: CPT | Performed by: OBSTETRICS & GYNECOLOGY

## 2022-10-06 PROCEDURE — 99213 OFFICE O/P EST LOW 20 MIN: CPT | Performed by: OBSTETRICS & GYNECOLOGY

## 2022-10-06 RX ORDER — NORETHINDRONE ACETATE 5 MG
5 TABLET ORAL DAILY
Qty: 90 TABLET | Refills: 0 | Status: SHIPPED | OUTPATIENT
Start: 2022-10-06 | End: 2022-10-28

## 2022-10-06 NOTE — PATIENT INSTRUCTIONS
If you have any questions regarding your visit, Please contact your care team.    [facility]: [phone]      [facility] CLINIC HOURS TELEPHONE NUMBER   Annette Waller DO.    AVELINA Ha-Surgery Scheduler  Joanna - Surgery Scheduler    REA Wiley, REA Amato RN     Monday, Thursday  [place]  7am-3pm    Tuesday, Wednesday  [place]  7am-3pm    E[place]O Friday &   [place]    Typical Surgery Days: Thursday or Friday   [facility]  [address] [place] [address] [phone] Phone  [phone] Fax    [address] [address] [phone] Phone    Imaging [phone] Phone    [facility] Labor and Delivery:  [phone] Phone     **Surgeries** Our Surgery Schedulers will contact you to schedule. If you do not receive a call within 3 business days, please call [phone].    Urgent Care locations:  [facility] Saturday and    9 am - 5 pm    Monday-Friday   12 pm - 8 pm  Saturday and    9 am - 5 pm   [phone]       If you need a medication refill, please contact your pharmacy. Please allow 3 business days for your refill to be completed.  As always, Thank you for trusting us with your healthcare needs!

## 2022-10-06 NOTE — PROGRESS NOTES
SUBJECTIVE:       HPI: Tiffany Sanz is a 42 year old  is s/p Exam under anesthesia, operative hysteroscopy using the MyoSure Reach, polypectomy, and dilatation and curettage   on  with Dr. Bojorquez. Since surgery, she has has minimal pain. Bleeding stopped a few days after the procedure and has recently restarted like a normal period. She has been sexually active.   She denies fevers/chills, nausea/vomiting.    Today's PHQ-2 Score:   PHQ-2 (  [employer]) 2022   Q1: Little interest or pleasure in doing things 0   Q2: Feeling down, depressed or hopeless 0   PHQ-2 Score 0   PHQ-2 Total Score (12-17 Years)- Positive if 3 or more points; Administer PHQ-A if positive -   Q1: Little interest or pleasure in doing things Not at all   Q2: Feeling down, depressed or hopeless Not at all   PHQ-2 Score 0     Today's PHQ-9 Score:   PHQ-9 SCORE 3/4/2020   PHQ-9 Total Score 0     Today's DONNY-7 Score:   DONNY-7 SCORE 3/4/2020   Total Score 0       Problem list and histories reviewed & adjusted, as indicated.  Additional history: as documented.    Patient Active Problem List   Diagnosis     CARDIOVASCULAR SCREENING; LDL GOAL LESS THAN 160     Morbid obesity (H)     Subserous leiomyoma of uterus     Hyperlipidemia     Genital herpes     Essential hypertension     Iron deficiency anemia due to chronic blood loss     Past Surgical History:   Procedure Laterality Date      SECTION        SECTION        SECTION, TUBAL LIGATION, COMBINED       HC INSERT IMPLANTABLE CONTRACEPTIVE CAPSULE      Nexplanon     HC INSERTION INTRAUTERINE DEVICE  2017    ParaGard     HC REMOVAL IMPLATABLE CONTRACEPTIVE CAPSULE       HC REMOVE INTRAUTERINE DEVICE  2017     INCISION/DRAIN ABSCESS EXTRA  2019    skin of right breast      Social History     Tobacco Use     Smoking status: Never Smoker     Smokeless tobacco: Never Used   Substance Use Topics     Alcohol use: Not Currently      *Patient is Adopted        *Family history is unknown by patient.        Problem (# of Occurrences) Relation (Name,Age of Onset)    Diabetes (1) Paternal Grandfather (My dad's father)    Heart Disease (2) Paternal Grandfather (My dad's father), Maternal Aunt    Mental Illness (1) Mother (Na)    Substance Abuse (1) Mother (Na)            Cholecalciferol (VITAMIN D) 50 MCG (2000 UT) CAPS,   Fe Bisgly-Succ-C-Thre-B12-FA (IRON-150 PO),   hydrochlorothiazide (HYDRODIURIL) 25 MG tablet, Take 1 tablet (25 mg) by mouth daily  labetalol (NORMODYNE) 300 MG tablet, TAKE 1 TABLET(300 MG) BY MOUTH TWICE DAILY  Multiple Vitamins-Minerals (MULTIVITAL PO),   misoprostol (CYTOTEC) 200 MCG tablet, Insert 2 tablets intravaginally and push up towards the cervix the evening prior to surgery, then repeat with 2 new tablets the morning of surgery. (Patient not taking: Reported on 10/6/2022)    No current facility-administered medications on file prior to visit.    Allergies   Allergen Reactions     Sulfa Drugs Itching       ROS:  10 Point review of systems negative other noted above in HPI    OBJECTIVE:     BP (!) 147/89 (BP Location: Right arm, Patient Position: Sitting, Cuff Size: Adult Large)   Pulse 85   Wt 140.8 kg (310 lb 6 oz)   LMP 09/20/2022 (Approximate)   BMI 49.06 kg/m    Body mass index is 49.06 kg/m .      Gen: Alert, oriented, appropriately interactive, NAD  Chest: Symmetrical, unlabored breathing  MSK: normal gait, symmetric movements UE & LE  Lower extremities: non-tender, no edema      In-Clinic Test Results:  No results found for this or any previous visit (from the past 24 hour(s)).     Final Diagnosis     Endometrium, curettage and polypectomy-Secretory endometrium with polypoid endocervical and endometrial tissue, negative for atypia or malignancy.   Electronically signed by Yasmani Rosenbaum Jr., MD on 9/22/2022 at  8:28 AM       ASSESSMENT/PLAN:                                                      Tiffany Sanz is a 42 year old   s/p Exam under anesthesia, operative hysteroscopy using the MyoSure Reach, polypectomy, and dilatation and curettage   on  with Dr. Bojorquez      ICD-10-CM    1. Postoperative state  Z98.890    2. Abnormal uterine bleeding (AUB)  N93.9 CBC with platelets     norethindrone (AYGESTIN) 5 MG tablet     CBC with platelets     AUB-P/L    Recurrent bleeding after surgery, likely period however recommending pelvic ultrasound and CBC for further evaluation.    Cleared for routine to normal activity, including intercourse.  Patient to continue taking iron medication.  Briefly discussed management options going forward as she was quite concerned about still bleeding and she is hoping to have gastric sleeve surgery in near future. This has been held secondary to anemia. She is thinking she eventually desires a hysterectomy but wants the sleeve first. Recommend evaluation as above today and follow-up with Dr. Justin to discuss options further. If she is unavailable, would be happy to see as well.  Given bleeding, prescribed Aygestin today. All questions answered.    Contraception - TL.         Annette Waller DO  [facility] [place]

## 2022-10-25 ENCOUNTER — ANCILLARY PROCEDURE (OUTPATIENT)
Dept: ULTRASOUND IMAGING | Facility: CLINIC | Age: 42
End: 2022-10-25
Attending: OBSTETRICS & GYNECOLOGY
Payer: COMMERCIAL

## 2022-10-25 DIAGNOSIS — N93.9 ABNORMAL UTERINE BLEEDING (AUB): ICD-10-CM

## 2022-10-25 PROCEDURE — 76830 TRANSVAGINAL US NON-OB: CPT | Performed by: RADIOLOGY

## 2022-10-25 PROCEDURE — 76856 US EXAM PELVIC COMPLETE: CPT | Performed by: RADIOLOGY

## 2022-10-28 ENCOUNTER — OFFICE VISIT (OUTPATIENT)
Dept: URGENT CARE | Facility: URGENT CARE | Age: 42
End: 2022-10-28
Payer: COMMERCIAL

## 2022-10-28 VITALS
HEART RATE: 70 BPM | SYSTOLIC BLOOD PRESSURE: 138 MMHG | WEIGHT: 293 LBS | OXYGEN SATURATION: 99 % | TEMPERATURE: 98.7 F | BODY MASS INDEX: 48.59 KG/M2 | DIASTOLIC BLOOD PRESSURE: 78 MMHG

## 2022-10-28 DIAGNOSIS — S13.4XXA NECK PAIN WITH TENDERNESS OF NECK AFTER WHIPLASH INJURY TO NECK: ICD-10-CM

## 2022-10-28 DIAGNOSIS — G44.86 HEADACHE, CERVICOGENIC: ICD-10-CM
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
## 2022-10-28 PROCEDURE — 99215 OFFICE O/P EST HI 40 MIN: CPT | Performed by: FAMILY MEDICINE

## 2022-10-28 NOTE — PATIENT INSTRUCTIONS
For pain    Acetaminophen (Tylenol ) 1000 mg 3 times a day for the next 2-3 days until pain resolves-maximum dose of acetaminophen (tylenol)  is 3000 mg in 24-hours       If headache worsens, any tingling/pain in arms, neck pain more severe, weakness, vision changes to ER-[facility]
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
## 2022-10-30 ENCOUNTER — APPOINTMENT (OUTPATIENT)
Dept: CT IMAGING | Facility: CLINIC | Age: 42
End: 2022-10-30
Attending: EMERGENCY MEDICINE
Payer: COMMERCIAL

## 2022-10-30 ENCOUNTER — HOSPITAL ENCOUNTER (EMERGENCY)
Facility: CLINIC | Age: 42
Discharge: HOME | End: 2022-10-30
Attending: EMERGENCY MEDICINE | Admitting: EMERGENCY MEDICINE
Payer: COMMERCIAL

## 2022-10-30 VITALS
SYSTOLIC BLOOD PRESSURE: 138 MMHG | DIASTOLIC BLOOD PRESSURE: 51 MMHG | HEART RATE: 63 BPM | RESPIRATION RATE: 16 BRPM | TEMPERATURE: 98.6 F | OXYGEN SATURATION: 100 %

## 2022-10-30 DIAGNOSIS — R51.9 NONINTRACTABLE HEADACHE, UNSPECIFIED CHRONICITY PATTERN, UNSPECIFIED HEADACHE TYPE: ICD-10-CM

## 2022-10-30 DIAGNOSIS — R03.0 ELEVATED BLOOD PRESSURE READING: ICD-10-CM
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
## 2022-10-30 PROCEDURE — 70450 CT HEAD/BRAIN W/O DYE: CPT

## 2022-10-30 PROCEDURE — 99284 EMERGENCY DEPT VISIT MOD MDM: CPT | Mod: 25

## 2022-10-30 ASSESSMENT — ACTIVITIES OF DAILY LIVING (ADL): ADLS_ACUITY_SCORE: 35
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
## 2022-12-09 DIAGNOSIS — I10 ESSENTIAL HYPERTENSION: ICD-10-CM

## 2022-12-09 RX ORDER — LABETALOL 300 MG/1
300 TABLET, FILM COATED ORAL 2 TIMES DAILY
Qty: 180 TABLET | Refills: 0 | Status: SHIPPED | OUTPATIENT
Start: 2022-12-09 | End: 2023-03-15

## 2022-12-09 RX ORDER — HYDROCHLOROTHIAZIDE 25 MG/1
25 TABLET ORAL DAILY
Qty: 90 TABLET | Refills: 0 | Status: SHIPPED | OUTPATIENT
Start: 2022-12-09 | End: 2023-03-15

## 2022-12-09 NOTE — TELEPHONE ENCOUNTER
.Reason for call:  Medication   If this is a refill request, has the caller requested the refill from the pharmacy already? No  Will the patient be using a [facility]? No  Name of the pharmacy and phone number for the current request: [facility]     Name of the medication requested: labetalol, hydrodiuril    Other request: fill script    Phone number to reach patient:  Home number on file [phone] (home)    Best Time:  anytime    Can we leave a detailed message on this number?  YES    Travel screening: Negative

## 2023-01-12 ENCOUNTER — VIRTUAL VISIT (OUTPATIENT)
Dept: FAMILY MEDICINE | Facility: CLINIC | Age: 43
End: 2023-01-12
Payer: COMMERCIAL

## 2023-01-12 DIAGNOSIS — E66.01 MORBID OBESITY (H): ICD-10-CM

## 2023-01-12 DIAGNOSIS — F41.9 ANXIETY: ICD-10-CM

## 2023-01-12 DIAGNOSIS — I10 ESSENTIAL HYPERTENSION: ICD-10-CM

## 2023-01-12 DIAGNOSIS — M62.838 SPASM OF MUSCLE: Primary | ICD-10-CM

## 2023-01-12 PROCEDURE — 99213 OFFICE O/P EST LOW 20 MIN: CPT | Mod: 93 | Performed by: PHYSICIAN ASSISTANT

## 2023-01-12 NOTE — PATIENT INSTRUCTIONS
At [facility], we strive to deliver an exceptional experience to you, every time we see you. If you receive a survey, please complete it as we do value your feedback.  If you have MyChart, you can expect to receive results automatically within 24 hours of their completion.  Your provider will send a note interpreting your results as well.   If you do not have MyChart, you should receive your results in about a week by mail.    Your care team:                            Family Medicine Internal Medicine   MD Yvan Sloan MD Shantel Branch-Fleming, MD Srinivasa Vaka, MD Katya Belousova, PAMAYANK Martínez CNP, MD ([facility]   Davidson Bennett, MD Symone Vaughn MD Amelia Massimini APRN CHRISS Taylor APRN MD Sam Fox MD          Clinic hours: Monday - Thursday 7 am-6 pm; Fridays 7 am-5 pm.   Urgent care: Monday - Friday 10 am- 8 pm; Saturday and Sunday 9 am-5 pm.    Clinic: [phone]       [facility]: Monday - Thursday 8 am - 7 pm; Friday 8 am - 6 pm  [facility]: [phone]

## 2023-01-12 NOTE — PROGRESS NOTES
"Tiffany is a 42 year old who is being evaluated via a billable telephone visit.      What phone number would you like to be contacted at? [phone]  How would you like to obtain your AVS? Olena  Distant Location (provider location):  Off-site    Assessment & Plan  sounds well, could just be from stress ( referral placed) but will recheck electrolytes as on HCTZ  Problem List Items Addressed This Visit     Morbid obesity (H)    Relevant Orders    Comprehensive Weight Management    Essential hypertension   Other Visit Diagnoses     Spasm of muscle    -  Primary    Relevant Orders    REVIEW OF HEALTH MAINTENANCE PROTOCOL ORDERS (Completed)    Comprehensive metabolic panel    Extra Tube    Vitamin B12    Anxiety        Relevant Orders    Adult Mental Health  Referral            13 minutes spent on the date of the encounter doing chart review, history and exam, documentation and further activities per the note     BMI:   Estimated body mass index is 48.59 kg/m  as calculated from the following:    Height as of 9/16/22: 1.694 m (5' 6.69\").    Weight as of 10/28/22: 139.4 kg (307 lb 6.4 oz).   Weight management plan: Discussed healthy diet and exercise guidelines   She was working towards baritraic surgery but iron was low 2nd to fibroids which has been addressed per pt       Return in about 1 week (around 1/19/2023) for Lab Work.    NEGAR Garcia  [facility]    Marques Allen is a 42 year old, presenting for the following health issues:  Spasms      History of Present Illness       Reason for visit:  I have been having spasms in my hands and feet  Symptom onset:  More than a month  Symptom intensity:  Moderate  Symptom progression:  Staying the same  Had these symptoms before:  No    She eats 0-1 servings of fruits and vegetables daily.She consumes 3 sweetened beverage(s) daily.She exercises with enough effort to increase her heart rate 9 or less minutes per day. "  She exercises with enough effort to increase her heart rate 3 or less days per week. She is missing 2 dose(s) of medications per week.  She is not taking prescribed medications regularly due to other.     Toes and fingers will spread up, then curl up, sometimes painful.     for a few months now, BMP in sept was ok  But was less frequent symptoms at that time.  Now happens about once a week, previous was 1-2 times a month.  Lasts under a minute.  On feet a lot at work but not really exercisering.  She has been stressed out ad anxious, interested in counseling.      Review of Systems   Cardio hx htn on HCTZ      Objective           Vitals:  No vitals were obtained today due to virtual visit.    Physical Exam   healthy, alert and no distress  PSYCH: Alert and oriented times 3; coherent speech, normal   rate and volume, able to articulate logical thoughts, able   to abstract reason, no tangential thoughts, no hallucinations   or delusions  Her affect is normal  RESP: No cough, no audible wheezing, able to talk in full sentences  Remainder of exam unable to be completed due to telephone visits           Phone call duration: 11 minutes

## 2023-03-15 DIAGNOSIS — I10 ESSENTIAL HYPERTENSION: ICD-10-CM

## 2023-03-15 RX ORDER — LABETALOL 300 MG/1
TABLET, FILM COATED ORAL
Qty: 180 TABLET | Refills: 0 | Status: SHIPPED | OUTPATIENT
Start: 2023-03-15 | End: 2024-02-13

## 2023-03-15 RX ORDER — HYDROCHLOROTHIAZIDE 25 MG/1
TABLET ORAL
Qty: 90 TABLET | Refills: 0 | Status: SHIPPED | OUTPATIENT
Start: 2023-03-15 | End: 2023-07-21

## 2023-03-23 ENCOUNTER — TELEPHONE (OUTPATIENT)
Dept: OBGYN | Facility: CLINIC | Age: 43
End: 2023-03-23
Payer: COMMERCIAL

## 2023-03-23 DIAGNOSIS — N64.4 BREAST PAIN, RIGHT: Primary | ICD-10-CM

## 2023-03-23 NOTE — TELEPHONE ENCOUNTER
FYI - Status Update    Who is Calling: patient    Update: She would like to get in to be seen for some pain area in her breast .      Does caller want a call/response back: Yes     Could we send this information to you in [employer] or would you prefer to receive a phone call?:   Patient would prefer a phone call   Okay to leave a detailed message?: Yes at Cell number on file:    Telephone Information:   Mobile [phone]

## 2023-03-24 NOTE — TELEPHONE ENCOUNTER
I think since patient is no longer experiencing the pain, she should be seen in the clinic instead of doing imaging right now.  She can follow-up with Dr. Ordonez for this.    Rosario Whitt, DO

## 2023-03-24 NOTE — TELEPHONE ENCOUNTER
RN called and left detailed message with providers advisement.    Lima Bradshaw RN on 3/24/2023 at 11:41 AM

## 2023-03-24 NOTE — TELEPHONE ENCOUNTER
Pt last seen 10/6/2022 for post op hysteroscopy.    Pt having concerns of breast pain in R) side around time of her cycle. Pt states it was different than normal tenderness feeling.    Pt states pain was near nipple. Denies any drainage or bleeding from nipple, pt denies lumps or discoloration or different texture of skin.    Pt states pain is no longer present but asking if she should be seen.    Pt has regularly scheduled mammogram on 4/12- RN routing to provider if diagnostic mammogram is needed instead.    Lima Bradshaw RN on 3/24/2023 at 8:30 AM

## 2023-04-28 ENCOUNTER — TELEPHONE (OUTPATIENT)
Dept: FAMILY MEDICINE | Facility: CLINIC | Age: 43
End: 2023-04-28
Payer: COMMERCIAL

## 2023-04-28 NOTE — TELEPHONE ENCOUNTER
Patient Quality Outreach    Patient is due for the following:   Physical Preventive Adult Physical      Topic Date Due     Hepatitis B Vaccine (1 of 3 - 3-dose series) Never done     COVID-19 Vaccine (3 - Booster for Elli series) 02/08/2022       Next Steps:   Schedule a Adult Preventative    Type of outreach:    Sent [employer] message. and Sent letter.      Questions for provider review:    None     Leslee Patterson

## 2023-04-28 NOTE — LETTER
April 28, 2023    Tiffany Guevara  [address]   [place] [address]    Dear Tiffany,    At [facility] we care about your health and are committed to providing quality patient care.     Here is a list of Health Maintenance topics that are due now or due soon:  Health Maintenance Due   Topic Date Due    ADVANCE CARE PLANNING  Never done    HEPATITIS B IMMUNIZATION (1 of 3 - 3-dose series) Never done    YEARLY PREVENTIVE VISIT  11/28/2018    COVID-19 Vaccine (3 - Booster for Elli series) 02/08/2022        We are recommending that you:  Schedule a WELLNESS (Preventative/Physical) APPOINTMENT with your primary care provider. If you go elsewhere for your wellness appointments then please disregard this reminder     and   Schedule a Nurse-Only appointment to update your immunizations: Your records indicate that you are not up to date with your immunizations, please schedule a nurse-only appointment to get these updated or update them at your next office visit. If this is incorrect, please disregard.    To schedule an appointment or discuss this further, you may contact us by phone at the [facility] at [phone] or online through the [email or web address] @ [email or web address]    Thank you for trusting [facility] and we appreciate the opportunity to serve you.  We look forward to supporting your healthcare needs in the future.    Your partners in health,      Quality Committee at [facility]

## 2023-05-23 ENCOUNTER — VIRTUAL VISIT (OUTPATIENT)
Dept: SURGERY | Facility: CLINIC | Age: 43
End: 2023-05-23
Payer: COMMERCIAL

## 2023-05-23 VITALS — WEIGHT: 293 LBS | HEIGHT: 66 IN | BODY MASS INDEX: 47.09 KG/M2

## 2023-05-23 VITALS — BODY MASS INDEX: 47.09 KG/M2 | WEIGHT: 293 LBS | HEIGHT: 66 IN

## 2023-05-23 DIAGNOSIS — I10 ESSENTIAL HYPERTENSION: ICD-10-CM

## 2023-05-23 DIAGNOSIS — Z86.32 HISTORY OF GESTATIONAL DIABETES: ICD-10-CM

## 2023-05-23 DIAGNOSIS — D50.0 IRON DEFICIENCY ANEMIA DUE TO CHRONIC BLOOD LOSS: ICD-10-CM

## 2023-05-23 DIAGNOSIS — E66.01 MORBID OBESITY (H): Primary | ICD-10-CM

## 2023-05-23 DIAGNOSIS — E78.5 HYPERLIPIDEMIA, UNSPECIFIED HYPERLIPIDEMIA TYPE: ICD-10-CM

## 2023-05-23 DIAGNOSIS — Z13.228 SCREENING FOR ENDOCRINE, NUTRITIONAL, METABOLIC AND IMMUNITY DISORDER: ICD-10-CM

## 2023-05-23 DIAGNOSIS — Z13.29 SCREENING FOR ENDOCRINE, NUTRITIONAL, METABOLIC AND IMMUNITY DISORDER: ICD-10-CM

## 2023-05-23 DIAGNOSIS — Z13.21 SCREENING FOR ENDOCRINE, NUTRITIONAL, METABOLIC AND IMMUNITY DISORDER: ICD-10-CM

## 2023-05-23 DIAGNOSIS — K59.00 CONSTIPATION, UNSPECIFIED CONSTIPATION TYPE: ICD-10-CM

## 2023-05-23 DIAGNOSIS — R06.83 SNORING: ICD-10-CM

## 2023-05-23 DIAGNOSIS — Z13.0 SCREENING FOR ENDOCRINE, NUTRITIONAL, METABOLIC AND IMMUNITY DISORDER: ICD-10-CM

## 2023-05-23 PROCEDURE — 99214 OFFICE O/P EST MOD 30 MIN: CPT | Mod: VID | Performed by: PHYSICIAN ASSISTANT

## 2023-05-23 PROCEDURE — 97803 MED NUTRITION INDIV SUBSEQ: CPT | Mod: VID

## 2023-05-23 RX ORDER — DOCUSATE SODIUM 100 MG/1
100 CAPSULE, LIQUID FILLED ORAL 2 TIMES DAILY
Qty: 60 CAPSULE | Refills: 11 | Status: SHIPPED | OUTPATIENT
Start: 2023-05-23 | End: 2024-07-09

## 2023-05-23 RX ORDER — CALCIUM CARBONATE/VITAMIN D3 600 MG-10
1 TABLET ORAL 2 TIMES DAILY
Qty: 180 TABLET | Refills: 3 | Status: SHIPPED | OUTPATIENT
Start: 2023-05-23 | End: 2024-02-07

## 2023-05-23 RX ORDER — MENTHOL 5.8 MG/1
2 LOZENGE ORAL DAILY
Qty: 180 TABLET | Refills: 3 | Status: SHIPPED | OUTPATIENT
Start: 2023-05-23 | End: 2024-02-07

## 2023-05-23 RX ORDER — POLYETHYLENE GLYCOL 3350 17 G/17G
1 POWDER, FOR SOLUTION ORAL DAILY
Qty: 578 G | Refills: 11 | Status: SHIPPED | OUTPATIENT
Start: 2023-05-23 | End: 2024-07-09

## 2023-05-23 NOTE — PATIENT INSTRUCTIONS
Nice to meet you today.  Below is our plan we discussed.-  EBONY Arevalo    Bariatric Task List  Lose 10 lbs prior to surgery.  Goal Weight: 300 lbs  Have preoperative laboratory tests drawn including iron studies.   Psychological Evaluation with MMPI and clearance for weight loss surgery.  Achieve clearance from dietitian to see surgeon.  Sleep center referral.  Labs have been ordered. Call [phone] for an appt.    Psychological evaluation was ordered.  Call [phone] to schedule.   Here is a link for the information videos to watch again (you likely watched these last year as well).  Please view our Weight Loss Surgery Seminar at the following website:     [email or web address]     FOLLOW-UP:  Call [phone]  -3 mo pre-surgery visit IN CLINIC with Irina Courtney PA-C  -1 mo nutrition visit  _________________________________________________  In general before being submitted for insurance approval, you will need the following:  -Clearance by the Psychologist  -Clearance by the dietitian  -Surgeon consult  -Use CPAP for at least one month before surgery if you have sleep apnea. Bring to hospital day of surgery.  -Tobacco free for 3 months before surgery and remain a non-smoker thereafter.   -If you have diabetes, A1C less than 8 before surgery  ____________________________________________________________________  After Bariatric Surgery:   Vitamin supplementation is a lifelong need. You will take:  B-12 daily or by injection monthly  Vitamin D3 5000 IU daily   2 Multivitamins containing 18mg of iron  Calcium citrate 2 daily  Thiamine 100 mg weekly   Vitron C once daily if you are a menstruating female  Follow up is impotent to keep your weight off and your vitamin levels up.  Your labs will be monitored every 3 months for the first year and yearly thereafter.  Prevent Ulcers by avoiding tobacco and anti-inflammatories (NSAIDS) forever.  Also alcohol  and caffeine in excess. NSAIDS examples: Aspirin, Ibuprofen, Naproxen) Tylenol is fine.    Protect you stomach if on Asprin.  If on Aspirin to protect your heart or for another reason, make sure to take a PPI like (omeprazole, protonix, nexium, prevacid) to protect your stomach.    Alcohol affects you differently. There is a 10% increase of Alcohol Use Disorder in patients with bariatric surgery.   If you have even ONE drink DO NOT DRIVE.

## 2023-05-23 NOTE — PATIENT INSTRUCTIONS
Joni Allen!    It was great chatting with you today! Here are some links to the information we discussed:         Vitamins and Minerals  [email or web address]     Diet Guidelines:  [email or web address]     Your Stage 1 Diet: Clear Liquids  [email or web address]     Your Stage 2 Diet: Low-fat Full Liquids  [email or web address]     Your Stage 3 Diet: Pureed Foods  [email or web address]     Your Stage 4 Diet: Soft Foods  [email or web address]    Your Stage 5 Diet: Regular Foods  [email or web address]       Here are the goals that will be required to clear you for surgery. You don't have to work on ALL of these at once - it may help to pick a few things at a time to focus on.    1. Begin taking a daily multivitamin , calcium, and vitamin D supplement   - The doses/requirements for these are in the vitamin/mineral handout link above.   - The easiest schedule will be to take calcium 2-3x/ per day, and take everything else at bedtime.      2. Eat slowly and chew well  - Take 20-30 minutes to eat each meal  - Chew all food to an applesauce consistency    3. Separate fluids from meals  - Avoid drinking/sipping for 30 minutes before, during and after meals    4. Eat balanced meals at regular intervals  - Eat your first meal within 1 hour of waking up, then eat every 4-6 hours  - Avoid grazing/mindless snacking    5. Avoid caffeine and carbonation  - Gradually reduce, and eventually eliminate beverages that contain bubbles or caffeine   - Tea, coffee, soda, etc           Your next visit can be scheduled via the call center at  and should be in one month. Have a great week and let us know if any questions/concerns pop up!       Carmella Negro RD, LD?  Clinical Dietitian

## 2023-05-23 NOTE — ASSESSMENT & PLAN NOTE
5/23/2023 [facility] Re-start bariatric surgery program from year prior. 310 lbs. Goal weight 300 lbs. Interested in gastric sleeve. Bariatric task sheet started.

## 2023-05-23 NOTE — PROGRESS NOTES
"Tiffany is a 42 year old who is being evaluated via a billable video visit.      The patient has been notified of following:     \"This video visit will be conducted via a call between you and your physician/provider. We have found that certain health care needs can be provided without the need for an in-person physical exam.  This service lets us provide the care you need with a video conversation.  If a prescription is necessary we can send it directly to your pharmacy.  If lab work is needed we can place an order for that and you can then stop by our lab to have the test done at a later time.    Video visits are billed at different rates depending on your insurance coverage.  Please reach out to your insurance provider with any questions.    If during the course of the call the physician/provider feels a video visit is not appropriate, you will not be charged for this service.\"    Patient has given verbal consent for Video visit? Yes    How would you like to obtain your AVS? MyChart    If the video visit is dropped, the invitation should be resent by: Text to cell phone: [phone]    Will anyone else be joining your video visit? No    I    Video-Visit Details    Type of service:  Video Visit    Video Start Time: 10:15 AM    Video End Time: 10:44am    Originating Location (pt. Location): Home    Distant Location (provider location):  [facility] [place]     Platform used for Video Visit: [facility] Bariatric Surgery Consultation Note    May 23, 2023    RE: Tiffany Guevara  MR#: [id number]  : 1980      Referring provider:       2021     8:44 AM   --   Who referred you Dr Barclay       Chief Complaint/Reason for visit: evaluation for possible weight loss surgery    Dear No Ref-Primary, Physician (General),    I had the pleasure of seeing your patient, Tiffany Guevara, to evaluate her obesity and consider her for possible weight loss surgery. As you know, Tiffany" "MARLEY Guevara is 42 year old.  She has a height of 5' 5.5\", a weight of 310 lbs 0 oz, and calculated Body mass index is 50.8 kg/m .     Assessment & Plan   Problem List Items Addressed This Visit     Morbid obesity (H) - Primary     5/23/2023 [facility] Re-start initial. 310 lbs. Goal weight 300 lbs. Sleeve         Relevant Medications    cholecalciferol 125 MCG (5000 UT) CAPS    Multiple Vitamins-Iron (QC DAILY MULTIVITAMINS/IRON) TABS    calcium carbonate-vitamin D (CALCIUM 600 + D) 600-10 MG-MCG per tablet    ferrous fumarate 65 mg, [place] [facility]. FE,-Vitamin C 125 mg (VITRON C)  MG TABS tablet    Other Relevant Orders    Sleep Study Referral    Comprehensive metabolic panel    Hemoglobin A1c    Vitamin D Deficiency    NUTRITION REFERRAL    Hyperlipidemia    Relevant Orders    Comprehensive metabolic panel    Essential hypertension    Relevant Orders    Comprehensive metabolic panel    Iron deficiency anemia due to chronic blood loss    Relevant Medications    ferrous fumarate 65 mg, [place] [facility]. FE,-Vitamin C 125 mg (VITRON C)  MG TABS tablet    Other Relevant Orders    Ferritin    CBC with platelets   Other Visit Diagnoses     Snoring        Relevant Orders    Sleep Study Referral    Comprehensive metabolic panel    History of gestational diabetes        Relevant Orders    Hemoglobin A1c    Screening for endocrine, nutritional, metabolic and immunity disorder        Constipation, unspecified constipation type        Relevant Medications    polyethylene glycol (MIRALAX) 17 GM/Dose powder    docusate sodium (COLACE) 100 MG capsule           In summary, Tiffany Guevara has Class III obesity with a body mass index of Body mass index is 50.8 kg/m . kg/m2 and the comorbidities stated above. She completed an informational seminar and is a possible candidate for bariatric surgery. She is restarting the process from a year ago when she had to stop to manage some health issues with her uterine fibroids.  She will have to " complete the following pre-requisites:    BARIATRIC TASK LIST  Lose 10 lbs prior to surgery.  Goal Weight: 300 lbs     Have preoperative laboratory tests drawn including iron studies.      Psychological Evaluation with MMPI and clearance for weight loss surgery.     Achieve clearance from dietitian to see surgeon.    Sleep center referral.    Once Tiffany has completed the requirements in the above tasklist and there are no further recommendations, she will see the surgeon for consultation for bariatric surgery. Tiffany  verbalizes understanding of the process to surgery and the post operative schedule.  All questions were answered.     Pt to follow up in 4-8 wks for a face to face visit with me. We will discuss the available weight loss surgeries including risks and benefits, get an official weight, review tasklist, and do a physical exam.     51 minutes spent on the date of the encounter doing chart review, history and exam, review test results, counseling, developing plan of care, documentation, and further activities as noted above.       HISTORY OF PRESENT ILLNESS:      4/20/2021     8:44 AM   Weight Loss History Reviewed with Patient   What is the most that you have ever weighed 305   What is the most weight you have lost? 20   I have tried the following methods to lose weight Watching portions or calories    Exercise   I have tried the following weight loss medications? (Check all that apply) Phentermine/Adipex-p/Suprenza   Prev weight loss surgery No       CO-MORBIDITIES OF OBESITY INCLUDE:      4/20/2021     8:44 AM   --   I have the following health issues associated with obesity High Blood Pressure    High Cholesterol       PAST MEDICAL HISTORY:  Past Medical History:   Diagnosis Date     Arthritis      Chlamydia 2016     Essential hypertension 8/28/2020     Genital herpes 1999     Gestational diabetes mellitus (GDM) in third trimester      History of transfusion of packed RBC 2017     Hyperlipidemia 3/21/2019     Overview:  Created by Conversion     Morbid obesity (H) 2018     Subserous leiomyoma of uterus 3/21/2019       PAST SURGICAL HISTORY:  Past Surgical History:   Procedure Laterality Date      SECTION  2017      SECTION        SECTION, TUBAL LIGATION, COMBINED       HC INSERT IMPLANTABLE CONTRACEPTIVE CAPSULE      Nexplanon     HC INSERTION INTRAUTERINE DEVICE      ParaGard     HC REMOVAL IMPLATABLE CONTRACEPTIVE CAPSULE       HC REMOVE INTRAUTERINE DEVICE       INCISION/DRAIN ABSCESS EXTRA  2019    skin of right breast   Some bleeding with prior  surgery but no other complications with surgery or anesthesia.    Low iron during pregnancy with iron transfusion and watching platelet counts. Increased bleeding during previous pregnancies - last one was better.    Birth control: Tubal ligation. Uterine ablation - periods have returned and are not heavy but are prolonged.    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 Yes   Can you afford 3 meals a day?  Yes   Can you afford 50-60 dollars a month for vitamins? Yes       HABITS:      2021     8:44 AM   --   How often do you drink alcohol? Monthly or less   If you do drink alcohol, how many drinks might you have in a day? (one drink = 5 oz. wine, 1 can/bottle of beer, 1 shot liquor) 1 or 2   Have you ever used any of the following nicotine  products? No   Have you or are you currently using street drugs or prescription strength medication for which you do not have a prescription for? No   Do you have a history of chemical dependency (alcohol or drug abuse)? No      No alcohol drinks within the past year    .[email or web address]    PSYCHOLOGICAL HISTORY:       4/20/2021     8:44 AM   Psychological History Reviewed With Patient   Have you ever attempted suicide? Never.   Past year suicide thought No   Have you ever been hospitalized for mental illness or a suicide attempt? Never.   Do you have a history of chronic pain? No   Have you ever been diagnosed with fibromyalgia? No   Are you currently seeing a therapist or counselor?  No   Are you currently seeing a psychiatrist? No      Had looked into surgery 1 year ago and then stopped due to fibroid concern. Had seen Dr. Paz - will still do a follow-up.        4/20/2021     8:44 AM   Questions Reviewed With Patient   How ready are you to make changes regarding your weight? Number 1 = Not ready at all to make changes up to 10 = very ready. 10   How confident are you that you can change? 1 = Not confident that you will be successful making changes up to 10 = very confident. 10       ROS:      4/20/2021     8:44 AM   --   Skin None of the above   HEENT Headaches   Musculoskeletal Back pain   Cardiovascular None of the above   Pulmonary Snoring   Gastrointestinal None of the above   Genitourinary None of the above   Hematological History of blood transfusions   Neurological None of the above   Female only Excessive menstrual bleeding       EATING BEHAVIORS:      4/20/2021     8:44 AM   --   Eating disorder screen No   Do you currently binge eat (eat a large amount of food in a short time)? No   Are you an emotional eater? No   Do you get up to eat after falling asleep? No       EXERCISE:      4/20/2021     8:44 AM   --   How often do you exercise? Less than 1 time per week   What is the duration of your exercise (in  minutes)? 45 Minutes   What types of exercise do you do? gym membership   What keeps you from being more active? Too tired       MEDICATIONS:  Current Outpatient Medications   Medication     calcium carbonate-vitamin D (CALCIUM 600 + D) 600-10 MG-MCG per tablet     Cholecalciferol (VITAMIN D) 50 MCG (2000 UT) CAPS     cholecalciferol 125 MCG (5000 UT) CAPS     docusate sodium (COLACE) 100 MG capsule     Fe Bisgly-Succ-C-Thre-B12-FA (IRON-150 PO)     ferrous fumarate 65 mg, [place]. FE,-Vitamin C 125 mg (VITRON C)  MG TABS tablet     hydrochlorothiazide (HYDRODIURIL) 25 MG tablet     labetalol (NORMODYNE) 300 MG tablet     Multiple Vitamins-Iron (QC DAILY MULTIVITAMINS/IRON) TABS     Multiple Vitamins-Minerals (MULTIVITAL PO)     polyethylene glycol (MIRALAX) 17 GM/Dose powder     No current facility-administered medications for this visit.        ALLERGIES:  Allergies   Allergen Reactions     Sulfa Antibiotics Itching       LABS AND RECORDS REVIEWED:  Hemoglobin A1C   Date Value Ref Range Status   06/11/2021 5.5 0 - 5.6 % Final     Comment:     Normal <5.7% Prediabetes 5.7-6.4%  Diabetes 6.5% or higher - adopted from ADA   consensus guidelines.       Vitamin D Deficiency screening   Date Value Ref Range Status   06/11/2021 29 20 - 75 ug/L Final     Comment:     Season, race, dietary intake, and treatment affect the concentration of   25-hydroxy-Vitamin D. Values may decrease during winter months and increase   during summer months. Values 20-29 ug/L may indicate Vitamin D insufficiency   and values <20 ug/L may indicate Vitamin D deficiency.  Vitamin D determination is routinely performed by an immunoassay specific for   25 hydroxyvitamin D3.  If an individual is on vitamin D2 (ergocalciferol)   supplementation, please specify 25 OH vitamin D2 and D3 level determination by   LCMSMS test [id number].       TSH   Date Value Ref Range Status   02/26/2019 1.50 0.40 - 4.00 mU/L Final     Sodium   Date Value Ref Range  Status   09/16/2022 138 133 - 144 mmol/L Final   06/11/2021 139 133 - 144 mmol/L Final     Potassium   Date Value Ref Range Status   09/16/2022 3.7 3.4 - 5.3 mmol/L Final   06/11/2021 4.0 3.4 - 5.3 mmol/L Final     Chloride   Date Value Ref Range Status   09/16/2022 103 94 - 109 mmol/L Final   06/11/2021 107 94 - 109 mmol/L Final     Carbon Dioxide   Date Value Ref Range Status   06/11/2021 29 20 - 32 mmol/L Final     Carbon Dioxide (CO2)   Date Value Ref Range Status   09/16/2022 30 20 - 32 mmol/L Final     Anion Gap   Date Value Ref Range Status   09/16/2022 5 3 - 14 mmol/L Final   06/11/2021 3 3 - 14 mmol/L Final     Glucose   Date Value Ref Range Status   09/16/2022 111 (H) 70 - 99 mg/dL Final   06/11/2021 93 70 - 99 mg/dL Final     Urea Nitrogen   Date Value Ref Range Status   09/16/2022 9 7 - 30 mg/dL Final   06/11/2021 9 7 - 30 mg/dL Final     Creatinine   Date Value Ref Range Status   09/16/2022 0.79 0.52 - 1.04 mg/dL Final   06/11/2021 0.80 0.52 - 1.04 mg/dL Final     GFR Estimate   Date Value Ref Range Status   09/16/2022 >90 >60 mL/min/1.73m2 Final     Comment:     Effective December 21, 2021 eGFRcr in adults is calculated using the 2021 CKD-EPI creatinine equation which includes age and gender (Clara et al., NEJM, DOI: 10.1056/XPOHqt6826387)   06/11/2021 >90 >60 mL/min/[1.73_m2] Final     Comment:     Non  GFR Calc  Starting 12/18/2018, serum creatinine based estimated GFR (eGFR) will be   calculated using the Chronic Kidney Disease Epidemiology Collaboration   (CKD-EPI) equation.       Calcium   Date Value Ref Range Status   09/16/2022 9.4 8.5 - 10.1 mg/dL Final   06/11/2021 8.7 8.5 - 10.1 mg/dL Final     Bilirubin Total   Date Value Ref Range Status   06/11/2021 0.3 0.2 - 1.3 mg/dL Final     Alkaline Phosphatase   Date Value Ref Range Status   06/11/2021 70 40 - 150 U/L Final     ALT   Date Value Ref Range Status   06/11/2021 29 0 - 50 U/L Final     AST   Date Value Ref Range Status  "  06/11/2021 19 0 - 45 U/L Final     Cholesterol   Date Value Ref Range Status   11/28/2017 217 (H) <=199 mg/dL Final     Direct Measure HDL   Date Value Ref Range Status   11/28/2017 53 >=50 mg/dL Final     LDL Cholesterol Calculated   Date Value Ref Range Status   11/28/2017 141 (H) <=129 mg/dL Final     LDL Cholesterol Direct   Date Value Ref Range Status   06/29/2012 153 (H) <130 mg/dL Final     Triglycerides   Date Value Ref Range Status   11/28/2017 116 <=149 mg/dL Final     WBC   Date Value Ref Range Status   06/11/2021 5.6 4.0 - 11.0 10e9/L Final     WBC Count   Date Value Ref Range Status   10/06/2022 8.1 4.0 - 11.0 10e3/uL Final     Hemoglobin   Date Value Ref Range Status   10/06/2022 11.2 (L) 11.7 - 15.7 g/dL Final   06/11/2021 9.1 (L) 11.7 - 15.7 g/dL Final     Hematocrit   Date Value Ref Range Status   10/06/2022 35.9 35.0 - 47.0 % Final   06/11/2021 30.4 (L) 35.0 - 47.0 % Final     MCV   Date Value Ref Range Status   10/06/2022 86 78 - 100 fL Final   06/11/2021 79 78 - 100 fl Final     Platelet Count   Date Value Ref Range Status   10/06/2022 184 150 - 450 10e3/uL Final   06/11/2021 228 150 - 450 10e9/L Final     Comment:     Automated count confirmed.  Giant platelets are present.         PHYSICAL EXAM:  Ht 5' 5.5\" (1.664 m)   Wt 310 lb (140.6 kg)   BMI 50.80 kg/m    GENERAL: Healthy, alert and no distress  EYES: Eyes grossly normal to inspection.  No discharge or erythema, or obvious scleral/conjunctival abnormalities.  RESP: No audible wheeze, cough, or visible cyanosis.  No visible retractions or increased work of breathing.    SKIN: Visible skin clear. No significant rash, abnormal pigmentation or lesions.  NEURO: Cranial nerves grossly intact.  Mentation and speech appropriate for age.  PSYCH: Mentation appears normal, affect normal/bright, judgement and insight intact, normal speech and appearance well-groomed.    Sincerely,     Irina Courtney PA-C          "

## 2023-05-23 NOTE — PROGRESS NOTES
"Tiffany is a 42 year old who is being evaluated via a billable video visit.      How would you like to obtain your AVS? MyChart  If the video visit is dropped, the invitation should be resent by: Text to cell phone: [phone]  Will anyone else be joining your video visit? No        Video-Visit Details    Type of service:  Video Visit   Video Start Time: 11:04am  Video End Time:11:21am    Originating Location (pt. Location): stationary vehicle    Distant Location (provider location):  Off-site  Platform used for Video Visit: bulletn. + Phone      [facility] -   PRE SURGICAL WEIGHT LOSS NUTRITION APPOINTMENT    Tiffany Guevara  1980  female  [id number]  42 year old    ASSESSMENT    Desired Surgical Procedure: gastric sleeve    REASON FOR VISIT:  Tiffany Guevara is a 42 year old year old female presents today for a pre-surgical weight loss follow-up appointment. Patient accompanied by self.    DIAGNOSIS:  Weight Status Obesity Grade III BMI >40    ANTHROPOMETRICS:  Height: 5' 5.5\"    Current weight: 310 lbs 0 oz  Goal = 300 lbs  BMI: Body mass index is 50.8 kg/m .    VITAMINS AND MINERALS:  None currently; ran out       NUTRITION HISTORY:  Breakfast: [wakes at 5:30am, eats at 8:30-9am] waffles + eggs + kirkpatrick   Lunch: [12-12:30pm] sandwich + chips  leftovers   Supper: [5:30-6pm] pasta  stew + vegetables  Snacks: occasional - sandwich, leftovers  Fluids Consumed: Water (64oz), diet coke (2 cans/week), SF sweet tea   Eating slower: Yes  Chewing foods thoroughly: No  Take 20-30 minutes to consume each meal: No  Fluids and meals separate by at least 30 minutes: No  Carbonation: yes  Caffeine: yes  Additional Information: Pt was cleared for surgery last year but then had to prioritize treatment of her fibroids. She is familiar with behavior changes associated with surgery but engaged in a quick review today.     PHYSICAL ACTIVITY:  None/inconsistent    DIAGNOSIS:  Previous Nutrition Diagnosis: Obesity related " to long history of self- monitoring deficit and excessive energy intake evidenced by previous BMI of >40 kg/m2  No change, modified below    Previous goals:   (remote)    Current Nutrition Diagnosis: Obesity related to long history of self-monitoring deficit and excessive energy intake as evidenced by BMI of 50.8 kg/m2.    INTERVENTION:  Nutrition Prescription: Recommended energy/nutrient modification.    GOALS:  Restart all pre-op vitamins  Eat balanced meals at regular intervals  Eat slowly and chew well  Separate fluids from meals  Eliminate carbonation and caffeine    Intervention:  - Discussed progress towards previous goals.  - Reinforced importance of making behavior changes in preparation for bariatric surgery.   - Assessed learning needs and learning preferences       NUTRITION MONITORING AND EVALUATION:  Anticipated compliance: fair-good  Patient demonstrated fair-good understanding.     Follow up: Continue to monitor patient closely regarding weight loss and diet.  # of visits needed: 1-2  Cleared by RD: No     TIME SPENT WITH PATIENT: 17 minutes    Carmella Negro RD, LD  Clinical Dietitian

## 2023-06-01 ENCOUNTER — OFFICE VISIT (OUTPATIENT)
Dept: FAMILY MEDICINE | Facility: CLINIC | Age: 43
End: 2023-06-01
Payer: COMMERCIAL

## 2023-06-01 VITALS
BODY MASS INDEX: 47.09 KG/M2 | WEIGHT: 293 LBS | TEMPERATURE: 98.1 F | OXYGEN SATURATION: 100 % | HEIGHT: 66 IN | HEART RATE: 81 BPM | DIASTOLIC BLOOD PRESSURE: 74 MMHG | RESPIRATION RATE: 16 BRPM | SYSTOLIC BLOOD PRESSURE: 129 MMHG

## 2023-06-01 DIAGNOSIS — F41.9 ANXIETY: ICD-10-CM

## 2023-06-01 DIAGNOSIS — R06.83 SNORING: ICD-10-CM

## 2023-06-01 DIAGNOSIS — M62.838 SPASM OF MUSCLE: ICD-10-CM

## 2023-06-01 DIAGNOSIS — E78.5 HYPERLIPIDEMIA, UNSPECIFIED HYPERLIPIDEMIA TYPE: ICD-10-CM

## 2023-06-01 DIAGNOSIS — Z86.32 HISTORY OF GESTATIONAL DIABETES: ICD-10-CM

## 2023-06-01 DIAGNOSIS — Z00.00 ROUTINE GENERAL MEDICAL EXAMINATION AT A HEALTH CARE FACILITY: Primary | ICD-10-CM

## 2023-06-01 DIAGNOSIS — D50.0 IRON DEFICIENCY ANEMIA DUE TO CHRONIC BLOOD LOSS: ICD-10-CM

## 2023-06-01 DIAGNOSIS — I10 ESSENTIAL HYPERTENSION: ICD-10-CM

## 2023-06-01 DIAGNOSIS — E66.01 MORBID OBESITY (H): ICD-10-CM

## 2023-06-01 LAB
ALBUMIN SERPL BCG-MCNC: 4.2 G/DL (ref 3.5–5.2)
ALP SERPL-CCNC: 80 U/L (ref 35–104)
ALT SERPL W P-5'-P-CCNC: 23 U/L (ref 10–35)
ANION GAP SERPL CALCULATED.3IONS-SCNC: 15 MMOL/L (ref 7–15)
AST SERPL W P-5'-P-CCNC: 26 U/L (ref 10–35)
BILIRUB SERPL-MCNC: <0.2 MG/DL
BUN SERPL-MCNC: 10.3 MG/DL (ref 6–20)
CALCIUM SERPL-MCNC: 9.8 MG/DL (ref 8.6–10)
CHLORIDE SERPL-SCNC: 103 MMOL/L (ref 98–107)
CREAT SERPL-MCNC: 0.88 MG/DL (ref 0.51–0.95)
DEPRECATED HCO3 PLAS-SCNC: 23 MMOL/L (ref 22–29)
ERYTHROCYTE [DISTWIDTH] IN BLOOD BY AUTOMATED COUNT: 16.4 % (ref 10–15)
FERRITIN SERPL-MCNC: 42 NG/ML (ref 6–175)
GFR SERPL CREATININE-BSD FRML MDRD: 83 ML/MIN/1.73M2
GLUCOSE SERPL-MCNC: 91 MG/DL (ref 70–99)
HBA1C MFR BLD: 6 % (ref 0–5.6)
HCT VFR BLD AUTO: 34.6 % (ref 35–47)
HGB BLD-MCNC: 10.2 G/DL (ref 11.7–15.7)
MCH RBC QN AUTO: 24.2 PG (ref 26.5–33)
MCHC RBC AUTO-ENTMCNC: 29.5 G/DL (ref 31.5–36.5)
MCV RBC AUTO: 82 FL (ref 78–100)
PLATELET # BLD AUTO: 198 10E3/UL (ref 150–450)
POTASSIUM SERPL-SCNC: 4 MMOL/L (ref 3.4–5.3)
PROT SERPL-MCNC: 7.8 G/DL (ref 6.4–8.3)
RBC # BLD AUTO: 4.22 10E6/UL (ref 3.8–5.2)
SODIUM SERPL-SCNC: 141 MMOL/L (ref 136–145)
VIT B12 SERPL-MCNC: 823 PG/ML (ref 232–1245)
WBC # BLD AUTO: 6.3 10E3/UL (ref 4–11)

## 2023-06-01 PROCEDURE — 99396 PREV VISIT EST AGE 40-64: CPT | Performed by: PHYSICIAN ASSISTANT

## 2023-06-01 PROCEDURE — 82306 VITAMIN D 25 HYDROXY: CPT | Performed by: PHYSICIAN ASSISTANT

## 2023-06-01 PROCEDURE — 99213 OFFICE O/P EST LOW 20 MIN: CPT | Mod: 25 | Performed by: PHYSICIAN ASSISTANT

## 2023-06-01 PROCEDURE — 80053 COMPREHEN METABOLIC PANEL: CPT | Performed by: PHYSICIAN ASSISTANT

## 2023-06-01 PROCEDURE — 85027 COMPLETE CBC AUTOMATED: CPT | Performed by: PHYSICIAN ASSISTANT

## 2023-06-01 PROCEDURE — 36415 COLL VENOUS BLD VENIPUNCTURE: CPT | Performed by: PHYSICIAN ASSISTANT

## 2023-06-01 PROCEDURE — 82607 VITAMIN B-12: CPT | Performed by: PHYSICIAN ASSISTANT

## 2023-06-01 PROCEDURE — 83036 HEMOGLOBIN GLYCOSYLATED A1C: CPT | Performed by: PHYSICIAN ASSISTANT

## 2023-06-01 PROCEDURE — 82728 ASSAY OF FERRITIN: CPT | Performed by: PHYSICIAN ASSISTANT

## 2023-06-01 RX ORDER — IBUPROFEN 600 MG/1
600 TABLET, FILM COATED ORAL
COMMUNITY
Start: 2022-09-20 | End: 2024-07-09

## 2023-06-01 RX ORDER — NAPROXEN 500 MG/1
TABLET ORAL
COMMUNITY
Start: 2022-07-31 | End: 2024-07-09

## 2023-06-01 RX ORDER — IBUPROFEN 600 MG/1
TABLET, FILM COATED ORAL
COMMUNITY
Start: 2022-09-20

## 2023-06-01 RX ORDER — FOLIC ACID/MV,IRON,MIN/LUTEIN 0.4-18-25
TABLET ORAL
COMMUNITY
Start: 2023-05-23 | End: 2024-07-09

## 2023-06-01 ASSESSMENT — ENCOUNTER SYMPTOMS
CHILLS: 0
CONSTIPATION: 0
COUGH: 0
DIARRHEA: 0
NAUSEA: 1
NERVOUS/ANXIOUS: 1
EYE PAIN: 0
SHORTNESS OF BREATH: 1
FREQUENCY: 0
PARESTHESIAS: 0
HEARTBURN: 0
DYSURIA: 0
FEVER: 0
HEMATURIA: 0
JOINT SWELLING: 0
DIZZINESS: 0
WEAKNESS: 0
BREAST MASS: 0
PALPITATIONS: 0
HEADACHES: 0
ABDOMINAL PAIN: 0
HEMATOCHEZIA: 0
SORE THROAT: 0
ARTHRALGIAS: 1
MYALGIAS: 0

## 2023-06-01 ASSESSMENT — PAIN SCALES - GENERAL: PAINLEVEL: NO PAIN (0)

## 2023-06-01 NOTE — PROGRESS NOTES
SUBJECTIVE:   CC: Tiffany is an 43 year old who presents for preventive health visit.       2023    10:59 AM   Additional Questions   Roomed by bella     Healthy Habits:     Getting at least 3 servings of Calcium per day:  NO    Bi-annual eye exam:  NO    Dental care twice a year:  NO    Sleep apnea or symptoms of sleep apnea:  Daytime drowsiness    Diet:  Regular (no restrictions)    Frequency of exercise:  None    Taking medications regularly:  Yes    Medication side effects:  Not applicable    PHQ-2 Total Score: 0    Additional concerns today:  No    She didn't f/u with therapy. thinks she still needs it.  Mood at baseline    Have you ever done Advance Care Planning? (For example, a Health Directive, POLST, or a discussion with a medical provider or your loved ones about your wishes): No, advance care planning information given to patient to review.  Patient declined advance care planning discussion at this time.    Social History     Tobacco Use     Smoking status: Never     Smokeless tobacco: Never   Vaping Use     Vaping status: Never Used     Passive vaping exposure: Yes   Substance Use Topics     Alcohol use: Not Currently             2023    11:12 AM   Alcohol Use   Prescreen: >3 drinks/day or >7 drinks/week? No     Reviewed orders with patient.  Reviewed health maintenance and updated orders accordingly - Yes      Breast Cancer Screenin/16/2022    10:46 AM   Breast CA Risk Assessment (FHS-7)   Do you have a family history of breast, colon, or ovarian cancer? No / Unknown         Pertinent mammograms are reviewed under the imaging tab.          Latest Ref Rng & Units 10/8/2020     3:57 PM 10/8/2020     3:45 PM 2017    11:49 AM   PAP / HPV   PAP    Negative for squamous intraepithelial lesion or malignancy  Electronically signed by Chiquis Stevenson CT (ASCP) on 2017 at 12:52 PM       PAP (Historical)  NIL       HPV 16 DNA NEG^Negative  Negative      HPV 18 DNA NEG^Negative   "Negative      Other HR HPV NEG^Negative  Negative        Reviewed and updated as needed this visit by clinical staff   Tobacco  Allergies  Meds              Reviewed and updated as needed this visit by Provider                     Review of Systems   Constitutional: Negative for chills and fever.   HENT: Negative for congestion, ear pain, hearing loss and sore throat.    Eyes: Negative for pain and visual disturbance.   Respiratory: Positive for shortness of breath. Negative for cough.    Cardiovascular: Positive for peripheral edema. Negative for chest pain and palpitations.   Gastrointestinal: Positive for nausea. Negative for abdominal pain, constipation, diarrhea, heartburn and hematochezia.   Breasts:  Negative for tenderness, breast mass and discharge.   Genitourinary: Negative for dysuria, frequency, genital sores, hematuria, pelvic pain, urgency, vaginal bleeding and vaginal discharge.   Musculoskeletal: Positive for arthralgias. Negative for joint swelling and myalgias.   Skin: Negative for rash.   Neurological: Negative for dizziness, weakness, headaches and paresthesias.   Psychiatric/Behavioral: Negative for mood changes. The patient is nervous/anxious.       OBJECTIVE:   /74 (BP Location: Left arm, Patient Position: Sitting, Cuff Size: Adult Large)   Pulse 81   Temp 98.1  F (36.7  C) (Oral)   Resp 16   Ht 1.676 m (5' 6\")   Wt 139.9 kg (308 lb 6.4 oz)   LMP 04/28/2023   SpO2 100%   BMI 49.78 kg/m    Physical Exam  GENERAL: healthy, alert and no distress  EYES: Eyes grossly normal to inspection, PERRL and conjunctivae and sclerae normal  HENT: ear canals and TM's normal, nose and mouth without ulcers or lesions  NECK: no adenopathy, no asymmetry, masses, or scars    RESP: lungs clear to auscultation - no rales, rhonchi or wheezes  CV: regular rate and rhythm, normal S1 S2, no S3 or S4, no murmur, click or rub,  ABDOMEN: soft, nontender, no hepatosplenomegaly, no masses and bowel sounds " normal  MS: no gross musculoskeletal defects noted, no edema  SKIN: no suspicious lesions or rashes  NEURO: Normal strength and tone, mentation intact and speech normal  PSYCH: mentation appears normal, affect normal/bright       ASSESSMENT/PLAN:   Tiffany was seen today for physical.    Diagnoses and all orders for this visit:    Routine general medical examination at a health care facility    Anxiety  -     Adult Mental Health  Referral; Future              COUNSELING:  Reviewed preventive health counseling, as reflected in patient instructions        She reports that she has never smoked. She has never used smokeless tobacco.      NEGAR Garcia  [facility]

## 2023-06-02 LAB — DEPRECATED CALCIDIOL+CALCIFEROL SERPL-MC: 41 UG/L (ref 20–75)

## 2023-06-12 ENCOUNTER — VIRTUAL VISIT (OUTPATIENT)
Dept: SURGERY | Facility: CLINIC | Age: 43
End: 2023-06-12
Payer: COMMERCIAL

## 2023-06-12 DIAGNOSIS — E66.01 MORBID OBESITY (H): Primary | ICD-10-CM

## 2023-06-12 NOTE — PROGRESS NOTES
Virtual Visit Details    Type of service:  Video Visit   Video Start Time: 3:13 PM  Video End Time:3:57 PM    Originating Location (pt. Location): Other work  Distant Location (provider location):  Off-site  Platform used for Video Visit: Zoom (Telehealth)    Tiffany would like to follow through with VSG for health reasons. She has struggled with her weight for much of her life and now is concerned about various comorbidities. She has had recent depressive and possible anxiety symptoms which are not currently treated. She is also a boredom eater. She has good knowledge of surgery and good support. She will follow up and complete psychological testing. F32.9; r/o F41.9; E66.01

## 2023-06-26 ENCOUNTER — VIRTUAL VISIT (OUTPATIENT)
Dept: SURGERY | Facility: CLINIC | Age: 43
End: 2023-06-26
Payer: COMMERCIAL

## 2023-06-26 DIAGNOSIS — E66.01 MORBID OBESITY (H): Primary | ICD-10-CM

## 2023-06-26 NOTE — PROGRESS NOTES
Virtual Visit Details    Type of service:  Video Visit   Video Start Time: 3:12 PM  Video End Time:3:58 PM    Originating Location (pt. Location): Other work/car  Distant Location (provider location):  Off-site  Platform used for Video Visit: Zoom (Telehealth)    Tiffany has made mild changes in eating and lifestyle and still needs to be more mindful about her eating. She is fretting because her OB-GYN wants her to have fibroids removed and she wants to hold off doing that after bariatric surgery. She still finds her  to be a variable support and it is unclear if her job is secure now. She will follow up with a list of activities and mindful eating strategies. She also still needs to complete updated psychological testing. F50.9; r/o F41.9; E66.01

## 2023-07-10 NOTE — TELEPHONE ENCOUNTER
"Pt seen today: \"Recurrent bleeding after surgery, likely period however recommending pelvic ultrasound for further evaluation.\"     called stating pt is trying to schedule US, RN placed orders per notes and pt working on getting US scheduled.    Lima Bradshaw RN on 10/6/2022 at 2:09 PM    "
Discharged

## 2023-07-11 ENCOUNTER — LAB (OUTPATIENT)
Dept: LAB | Facility: CLINIC | Age: 43
End: 2023-07-11
Payer: COMMERCIAL

## 2023-07-11 ENCOUNTER — ALLIED HEALTH/NURSE VISIT (OUTPATIENT)
Dept: FAMILY MEDICINE | Facility: CLINIC | Age: 43
End: 2023-07-11
Payer: COMMERCIAL

## 2023-07-11 ENCOUNTER — VIRTUAL VISIT (OUTPATIENT)
Dept: FAMILY MEDICINE | Facility: CLINIC | Age: 43
End: 2023-07-11
Payer: COMMERCIAL

## 2023-07-11 DIAGNOSIS — Z11.1 SCREENING EXAMINATION FOR PULMONARY TUBERCULOSIS: ICD-10-CM

## 2023-07-11 DIAGNOSIS — Z23 ENCOUNTER FOR IMMUNIZATION: Primary | ICD-10-CM

## 2023-07-11 DIAGNOSIS — Z11.1 SCREENING EXAMINATION FOR PULMONARY TUBERCULOSIS: Primary | ICD-10-CM

## 2023-07-11 PROCEDURE — 36415 COLL VENOUS BLD VENIPUNCTURE: CPT

## 2023-07-11 PROCEDURE — 86481 TB AG RESPONSE T-CELL SUSP: CPT

## 2023-07-11 PROCEDURE — 90471 IMMUNIZATION ADMIN: CPT

## 2023-07-11 PROCEDURE — 99207 PR NO CHARGE NURSE ONLY: CPT

## 2023-07-11 PROCEDURE — 99441 PR PHYSICIAN TELEPHONE EVALUATION 5-10 MIN: CPT | Mod: 95 | Performed by: PHYSICIAN ASSISTANT

## 2023-07-11 PROCEDURE — 90746 HEPB VACCINE 3 DOSE ADULT IM: CPT

## 2023-07-11 NOTE — PROGRESS NOTES
Prior to immunization administration, verified patients identity using patient s name and date of birth. Please see Immunization Activity for additional information.     Screening Questionnaire for Adult Immunization    Are you sick today?   No   Do you have allergies to medications, food, a vaccine component or latex?   No   Have you ever had a serious reaction after receiving a vaccination?   No   Do you have a long-term health problem with heart, lung, kidney, or metabolic disease (e.g., diabetes), asthma, a blood disorder, no spleen, complement component deficiency, a cochlear implant, or a spinal fluid leak?  Are you on long-term aspirin therapy?   No   Do you have cancer, leukemia, HIV/AIDS, or any other immune system problem?   No   Do you have a parent, brother, or sister with an immune system problem?   No   In the past 3 months, have you taken medications that affect  your immune system, such as prednisone, other steroids, or anticancer drugs; drugs for the treatment of rheumatoid arthritis, Crohn s disease, or psoriasis; or have you had radiation treatments?   No   Have you had a seizure, or a brain or other nervous system problem?   No   During the past year, have you received a transfusion of blood or blood    products, or been given immune (gamma) globulin or antiviral drug?   No   For women: Are you pregnant or is there a chance you could become       pregnant during the next month?   No   Have you received any vaccinations in the past 4 weeks?   No     Immunization questionnaire answers were all negative.    I have reviewed the following standing orders:   This patient is due and qualifies for the Hepatitis B vaccine.    Click here for Hepatitis B Standing Order    I have reviewed the vaccines inclusion and exclusion criteria; No concerns regarding eligibility.         Patient instructed to remain in clinic for 15 minutes afterwards, and to report any adverse reactions.     Screening performed by  Sergio Heard MA on 7/11/2023 at 12:01 PM.

## 2023-07-11 NOTE — PROGRESS NOTES
Tiffany is a 43 year old who is being evaluated via a billable telephone visit.             Assessment & Plan   Problem List Items Addressed This Visit    None  Visit Diagnoses     Screening examination for pulmonary tuberculosis    -  Primary    Relevant Orders    Quantiferon TB Gold Plus                10 minutes spent by me on the date of the encounter doing chart review, history and exam, documentation and further activities per the note       NEGAR Garcia  [facility]    Subjective   Tiffany is a 43 year old, presenting for the following health issues:  Orders (TB GOLD AND HEP B)        6/1/2023    10:59 AM   Additional Questions   Roomed by bella     History of Present Illness       Reason for visit:  ORDERS FOR TB GOLD AND HEP B    She eats 2-3 servings of fruits and vegetables daily.She consumes 1 sweetened beverage(s) daily.She exercises with enough effort to increase her heart rate 9 or less minutes per day.  She exercises with enough effort to increase her heart rate 3 or less days per week.   She is taking medications regularly.     Negative mantoux  Last march- forgets to come back for mantoux readings so would like blood test    Hep B sAg negative 2019- needs hep B vaccine- will sched MA visit    Will call [facility] referred in [place]       Review of Systems   RESp- denies cough or unexplained weigh tloss      Objective           Vitals:  No vitals were obtained today due to virtual visit.    Physical Exam   GENERAL: Healthy, alert and no distress  RESP: no cough  NEURO: mentation intact  PSYCH: judgement and insight intact           tele visit lasted 6 minutes  Originating Location (pt. Location): Home   Distant Location (provider location):  On-site  Platform used for Video Visit: Flora

## 2023-07-12 ENCOUNTER — MYC MEDICAL ADVICE (OUTPATIENT)
Dept: FAMILY MEDICINE | Facility: CLINIC | Age: 43
End: 2023-07-12
Payer: COMMERCIAL

## 2023-07-12 NOTE — TELEPHONE ENCOUNTER
Labs are still in process at this time and patient updated.    REA Hwang  [facility] Primary Care Triage

## 2023-07-13 LAB
GAMMA INTERFERON BACKGROUND BLD IA-ACNC: 0 IU/ML
M TB IFN-G BLD-IMP: NEGATIVE
M TB IFN-G CD4+ BCKGRND COR BLD-ACNC: 4.37 IU/ML
MITOGEN IGNF BCKGRD COR BLD-ACNC: 0 IU/ML
MITOGEN IGNF BCKGRD COR BLD-ACNC: 0 IU/ML
QUANTIFERON MITOGEN: 4.37 IU/ML
QUANTIFERON NIL TUBE: 0 IU/ML
QUANTIFERON TB1 TUBE: 0 IU/ML
QUANTIFERON TB2 TUBE: 0

## 2023-07-17 ENCOUNTER — VIRTUAL VISIT (OUTPATIENT)
Dept: SURGERY | Facility: CLINIC | Age: 43
End: 2023-07-17
Payer: COMMERCIAL

## 2023-07-17 DIAGNOSIS — E66.01 MORBID OBESITY (H): Primary | ICD-10-CM

## 2023-07-17 NOTE — PROGRESS NOTES
Virtual Visit Details    Type of service:  Video Visit   Video Start Time: 3:30 PM  Video End Time:3:53 PM    Originating Location (pt. Location): Other car  Distant Location (provider location):  Off-site  Platform used for Video Visit: Zoom (Telehealth)    Tiffany admits that she is under a great deal of stress coping with the impending move in September. She believes that after the move and getting her kids settled will allow for her to focus on these issues more readily. Thus, we will meet in two weeks, but may need to meet again after the move in September.She still needs to send the packet of questionnaires and complete the list of activities. F50.9; r/o F41.9; E66.01

## 2023-07-21 DIAGNOSIS — I10 ESSENTIAL HYPERTENSION: ICD-10-CM

## 2023-07-21 RX ORDER — HYDROCHLOROTHIAZIDE 25 MG/1
TABLET ORAL
Qty: 90 TABLET | Refills: 1 | Status: SHIPPED | OUTPATIENT
Start: 2023-07-21 | End: 2024-10-03

## 2023-07-31 ENCOUNTER — VIRTUAL VISIT (OUTPATIENT)
Dept: SURGERY | Facility: CLINIC | Age: 43
End: 2023-07-31
Payer: COMMERCIAL

## 2023-07-31 DIAGNOSIS — E66.01 MORBID OBESITY (H): Primary | ICD-10-CM

## 2023-07-31 NOTE — PROGRESS NOTES
Virtual Visit Details    Type of service:  Video Visit   Video Start Time: 10:30 AM  Video End Time:10:53 AM    Originating Location (pt. Location): Other car  \Distant Location (provider location):  Off-site  Platform used for Video Visit: Mashape    Recommendations: This patient noted that she is in the midst of moving to a new place of residence (beginning of September) and would like the family to feel more settled before going through a surgical process. She has struggled to make appointments and complete the requisite updated psychological testing as well. She appears to be coping with increased stressors with irritability and decreased frustration tolerance. Thus, at this point, she cannot be deemed a viable candidate for bariatric surgery from a psychosocial perspective. The final decision to follow through with bariatric surgery should be done in collaboration with [facility] staff.    Current Treatment Plan and Aftercare Plan: In order to be deemed a viable candidate for bariatric surgery, this patient will need to complete the following treatment recommendations: She will continue to meet with her clinical dietitian and physician s assistant at the clinic to ensure appropriate nutrition and eating behaviors, attend support group meetings to improve knowledge of surgery and support around surgery, maintain mindful eating behaviors, document her eating, measure her food, plan out her meals, increase activity level and follow up with this clinician after she and her dietitian feel she is in a better position to proceed.

## 2023-08-01 ENCOUNTER — ANCILLARY PROCEDURE (OUTPATIENT)
Dept: MAMMOGRAPHY | Facility: CLINIC | Age: 43
End: 2023-08-01
Payer: COMMERCIAL

## 2023-08-01 DIAGNOSIS — Z12.31 VISIT FOR SCREENING MAMMOGRAM: ICD-10-CM

## 2023-08-01 PROCEDURE — 77067 SCR MAMMO BI INCL CAD: CPT | Mod: GC | Performed by: RADIOLOGY

## 2023-08-01 PROCEDURE — 77063 BREAST TOMOSYNTHESIS BI: CPT | Mod: GC | Performed by: RADIOLOGY

## 2023-08-03 ENCOUNTER — VIRTUAL VISIT (OUTPATIENT)
Dept: FAMILY MEDICINE | Facility: CLINIC | Age: 43
End: 2023-08-03
Payer: COMMERCIAL

## 2023-08-03 DIAGNOSIS — E66.01 MORBID OBESITY (H): Primary | ICD-10-CM

## 2023-08-03 DIAGNOSIS — M72.2 PLANTAR FASCIITIS: ICD-10-CM

## 2023-08-03 DIAGNOSIS — R73.03 PREDIABETES: ICD-10-CM

## 2023-08-03 PROCEDURE — 99443 PR PHYSICIAN TELEPHONE EVALUATION 21-30 MIN: CPT | Mod: 95 | Performed by: PHYSICIAN ASSISTANT

## 2023-08-03 NOTE — PROGRESS NOTES
Tiffany is a 43 year old who is being evaluated via a billable telephone visit.      How would you like to obtain your AVS? MyChart           Assessment & Plan    will see if wegovy will be covered,  see AVS for stretches for her foot  Problem List Items Addressed This Visit          Digestive    Morbid obesity (H) - Primary    Relevant Medications    Semaglutide-Weight Management (WEGOVY) 0.25 MG/0.5ML pen       Endocrine    Prediabetes     Other Visit Diagnoses       Plantar fasciitis                     23 minutes spent by me on the date of the encounter doing chart review, history and exam, documentation and further activities per the note  {   NEGAR Garcia [facility]    Subjective   Tiffany is a 43 year old, presenting for the following health issues:  Weight Loss        8/3/2023     4:22 PM   Additional Questions   Roomed by Sergio   Accompanied by Self       HPI     Concern - Weight Consult  Onset: a couple years put on a lot of weight   Description: weight gain  Intensity: severe  Progression of Symptoms:  worsening  Accompanying Signs & Symptoms: weight gain  Previous history of similar problem: Yes  Precipitating factors:        Worsened by: none  Alleviating factors:        Improved by: none  Therapies tried and outcome: Was following with bariatrics but had too much going on psychosocially to consider surgery currently , diet and exercise, tried phentermine which worked but cause hair loss    Most recent in clinic weight 6/1  308 (which is about were she is at now), BMI 49  Recently found to be prediabetic      C/o left heel for about a week, worse first thing in am.      Review of Systems   ENDO weight as above      Objective           Vitals:  No vitals were obtained today due to virtual visit.    Physical Exam   GENERAL: Healthy, alert and no distress  RESP: No audible wheeze, cough, or visible cyanosis.  No visible retractions or increased work of breathing.     NEURO: mentation intact  PSYCH: judgement and insight intact       Telephone length 16 min    Distant Location (provider location):  On-site

## 2023-08-03 NOTE — PATIENT INSTRUCTIONS
At [facility], we strive to deliver an exceptional experience to you, every time we see you. If you receive a survey, please complete it as we do value your feedback.  If you have MyChart, you can expect to receive results automatically within 24 hours of their completion.  Your provider will send a note interpreting your results as well.   If you do not have MyChart, you should receive your results in about a week by mail.    Your care team:                            Family Medicine Internal Medicine   MD Yvan Sloan MD Shantel Branch-Fleming, MD Srinivasa Vaka, MD Katya Belousova, PAMAYANK Martínez CNP, MD ([facility]   Davidson Bennett, MD Symone Vaughn MD Amelia Massimini APRN CHRISS Taylor APRN MD Sam Fox MD          Clinic hours: Monday - Thursday 7 am-6 pm; Fridays 7 am-5 pm.   Urgent care: Monday - Friday 10 am- 8 pm; Saturday and Sunday 9 am-5 pm.    Clinic: [phone]       [facility]: Monday - Thursday 8 am - 7 pm; Friday 8 am - 6 pm  [facility]: [phone]

## 2023-08-08 ENCOUNTER — VIRTUAL VISIT (OUTPATIENT)
Dept: BEHAVIORAL HEALTH | Facility: CLINIC | Age: 43
End: 2023-08-08
Attending: PHYSICIAN ASSISTANT
Payer: COMMERCIAL

## 2023-08-08 DIAGNOSIS — F43.23 ADJUSTMENT DISORDER WITH MIXED ANXIETY AND DEPRESSED MOOD: Primary | ICD-10-CM

## 2023-08-08 PROCEDURE — 90837 PSYTX W PT 60 MINUTES: CPT | Mod: 95 | Performed by: SOCIAL WORKER

## 2023-08-08 ASSESSMENT — COLUMBIA-SUICIDE SEVERITY RATING SCALE - C-SSRS
TOTAL  NUMBER OF ABORTED OR SELF INTERRUPTED ATTEMPTS LIFETIME: NO
1. HAVE YOU WISHED YOU WERE DEAD OR WISHED YOU COULD GO TO SLEEP AND NOT WAKE UP?: NO
TOTAL  NUMBER OF INTERRUPTED ATTEMPTS LIFETIME: NO
ATTEMPT LIFETIME: NO
2. HAVE YOU ACTUALLY HAD ANY THOUGHTS OF KILLING YOURSELF?: NO
6. HAVE YOU EVER DONE ANYTHING, STARTED TO DO ANYTHING, OR PREPARED TO DO ANYTHING TO END YOUR LIFE?: NO

## 2023-08-08 NOTE — PROGRESS NOTES
"[facility] Care: : Integrated Behavioral Health  2023      Behavioral Health Clinician Progress Note    Patient Name: Tiffany Guevara           Service Type:  Individual      Service Location:   Phone call (patient / identified key support person reached)     Session Start Time: 12:40pm  Session End Time: 1:33pm      Session Length: 53 - 60      Attendees: Client     Service Modality:  Phone Visit:      Provider verified identity through the following two step process.  Patient provided:  Patient  and Patient address    Telephone Visit: The patient's condition can be safely assessed and treated via synchronous audio telemedicine encounter.      Reason for Audio Telemedicine Visit: Patient has requested telehealth visit    Originating Site (Patient Location): Patient's home    Distant Site (Provider Location): [facility]    Consent:  The patient/guardian has verbally consented to:     1. The potential risks and benefits of telemedicine (telephone visit) versus in person care;    The patient has been notified of the following:      \"We have found that certain health care needs can be provided without the need for a face to face visit.  This service lets us provide the care you need with a phone conversation.       I will have full access to your [employer] medical record during this entire phone call.   I will be taking notes for your medical record.      Since this is like an office visit, we will bill your insurance company for this service.       There are potential benefits and risks of telephone visits (e.g. limits to patient confidentiality) that differ from in-person visits.?Confidentiality still applies for telephone services, and nobody will record the visit.  It is important to be in a quiet, private space that is free of distractions (including cell phone or other devices) during the visit.??      If during the course " "of the call I believe a telephone visit is not appropriate, you will not be charged for this service\"     Consent has been obtained for this service by care team member: Yes     Visit Activities (Refresh list every visit): [facility] and [facility] Only    Diagnostic Assessment Date: Will be created in the first four sessions  Treatment Plan Review Date: Provider and patient will continue to build rapport and discuss tx goals in their next few sessions.     See Flowsheets for today's PHQ-9 and DONNY-7 results  Previous PHQ-9:       6/4/2019    11:45 AM 3/4/2020    10:26 AM   PHQ-9 SCORE   PHQ-9 Total Score 4 0     Previous DONNY-7:       3/4/2020    10:27 AM   DONNY-7 SCORE   Total Score 0       BOYD LEVEL:      4/20/2021     8:15 AM   BOYD Score (Last Two)   BOYD Raw Score 37   Activation Score 79.2   BOYD Level 4       DATA  Extended Session (60+ minutes): No  Interactive Complexity: No  Crisis: No  [facility] Patient: No    Treatment Objective(s) Addressed in This Session:  Target Behavior(s):  depression, stress    Depressed Mood: Increase interest, engagement, and pleasure in doing things  Decrease frequency and intensity of feeling down, depressed, hopeless  Feel less tired and more energy during the day     Current Stressors / Issues:    Patient arrived in [facility] for her individual telephone session.  Unfortunately her video was having technical problems and was agreeable to switching to a telephone visit.  Patient shares that she has been feeling very overwhelmed lately due to several things occurring for her right now.  Patient shares that she had a difficult childhood and was in foster care throughout her life.  She shares that she recently found out that her father who she has known throughout her life is not her biological father.  Unfortunately her biological father has already passed away which is upsetting for the patient.  She shares that she is unsure of how she wants to tell her kids about this as they are connected with " them she thought her father was.  Feeling overwhelmed.  Patient shares that she has 3 young children in her home and has a  whom she does not feel is supportive of her.  She shares that one of her children has an absent father who struggled with alcoholism throughout his life and unfortunately went into a coma and had to get his leg and arm amputated due to complications.  She shares that he now lives in a nursing home and is sober and has been trying to get in contact with her to start a relationship with her 6-year-old son.  Patient expresses frustration and nervousness of exposing her son to his father who has made poor decisions and been absent from his life.  Patient expresses ambivalence of how to go about deciding what is best for her son.  She shares that her friends and family have strong opinions of what she should do and is feeling pressured despite not feeling comfortable.  Processed feelings with the patient and at this time she feels it was best to wait and see if her son's father will make an effort to start visitations.At this time patient would like to continue services with [facility] for ongoing support and schedule follow-up appointment for 8/24/23.  Patient denies suicidal thoughts or history of them.      Progress on Treatment Objective(s) / Homework:  New Objective established this session - CONTEMPLATION (Considering change and yet undecided); Intervened by assessing the negative and positive thinking (ambivalence) about behavior change    Motivational Interviewing    MI Intervention: Expressed Empathy/Understanding, Permission to raise concern or advise, Open-ended questions, Reflections: simple and complex, and Change talk (evoked)     Change Talk Expressed by the Patient: Desire to change Reasons to change Need to change    Provider Response to Change Talk: E - Evoked more info from patient about behavior change and A - Affirmed patient's thoughts, decisions, or attempts at behavior  change    Also provided psychoeducation about behavioral health condition, symptoms, and treatment options    Care Plan review completed: Yes    Medication Review:  No current psychiatric medications prescribed  Current Outpatient Medications   Medication    calcium carbonate-vitamin D (CALCIUM 600 + D) 600-10 MG-MCG per tablet    CERTAVITE/ANTIOXIDANTS tablet    Cholecalciferol (VITAMIN D) 50 MCG (2000 UT) CAPS    cholecalciferol 125 MCG (5000 UT) CAPS    docusate sodium (COLACE) 100 MG capsule    Fe Bisgly-Succ-C-Thre-B12-FA (IRON-150 PO)    ferrous fumarate 65 mg, [place]. FE,-Vitamin C 125 mg (VITRON C)  MG TABS tablet    hydrochlorothiazide (HYDRODIURIL) 25 MG tablet    ibuprofen (ADVIL/MOTRIN) 600 MG tablet    ibuprofen (ADVIL/MOTRIN) 600 MG tablet    labetalol (NORMODYNE) 300 MG tablet    Multiple Vitamins-Iron (QC DAILY MULTIVITAMINS/IRON) TABS    Multiple Vitamins-Minerals (MULTIVITAL PO)    naproxen (NAPROSYN) 500 MG tablet    polyethylene glycol (MIRALAX) 17 GM/Dose powder    Semaglutide-Weight Management (WEGOVY) 0.25 MG/0.5ML pen     No current facility-administered medications for this visit.         Medication Compliance:  NA    Changes in Health Issues:   None reported    Chemical Use Review:   Substance Use: Chemical use reviewed, no active concerns identified      Tobacco Use: No current tobacco use.      Assessment: Current Emotional / Mental Status (status of significant symptoms):  Risk status (Self / Other harm or suicidal ideation)  Patient denies a history of suicidal ideation, suicide attempts, self-injurious behavior, homicidal ideation, homicidal behavior, and and other safety concerns  Patient denies current fears or concerns for personal safety.  Patient denies current or recent suicidal ideation or behaviors.  Patient denies current or recent homicidal ideation or behaviors.  Patient denies current or recent self injurious behavior or ideation.  Patient denies other safety  concerns.  A safety and risk management plan has not been developed at this time, however patient was encouraged to call [place] [id number] should there be a change in any of these risk factors.    Appearance:   Unable to assess due to telephone visit   Eye Contact:   Unable to assess due to telephone visit   Psychomotor Behavior: Unable to assess due to telephone visit   Attitude:   Cooperative   Orientation:   All  Speech   Rate / Production: Emotional Talkative   Volume:  Normal   Mood:    Sad  Panicked Fearful  Affect:    Worrisome   Thought Content:  Rumination   Thought Form:  Coherent  Goal Directed  Circumstantial  Insight:    Fair     Diagnoses:  1. Adjustment disorder with mixed anxiety and depressed mood        Collateral Reports Completed:  Not Applicable    Plan: (Homework, other):  Patient was given information about behavioral services and encouraged to schedule a follow up appointment with the clinic [facility] in 2 weeks.  She was also given information about mental health symptoms and treatment options .  CD Recommendations: No indications of CD issues.       Aria Nickerson, [facility]    ______________________________________________________________________    Integrated Primary Care Behavioral Health Treatment Plan    Patient's Name: Tiffany Guevara  YOB: 1980    Date of Creation: will be created in the first few sessions    Date Treatment Plan Last Reviewed/Revised: TBD    DSM5 Diagnoses: Adjustment disorder with anxiety and depressed mood  Psychosocial / Contextual Factors: Grandmother recently passed away, strained relationship with , son's father reaching out for visitation  PROMIS (reviewed every 90 days): 26    Referral / Collaboration:  Referral to another professional/service is not indicated at this time..    Anticipated number of session for this episode of care: 10+  Anticipation frequency of session: Every other week  Anticipated Duration of each session: 38-52  minutes  Treatment plan will be reviewed in 90 days or when goals have been changed.         Patient has reviewed and agreed to the above plan.      Aria Nickerson, MARQUIS  August 8, 2023

## 2023-09-06 ENCOUNTER — TELEPHONE (OUTPATIENT)
Dept: SURGERY | Facility: CLINIC | Age: 43
End: 2023-09-06
Payer: COMMERCIAL

## 2023-09-06 NOTE — TELEPHONE ENCOUNTER
Called patient at primary phone number and left message to complete questionnaire prior to appointment.   Marlene Schofield, CMA

## 2023-09-19 ENCOUNTER — TELEPHONE (OUTPATIENT)
Dept: SURGERY | Facility: CLINIC | Age: 43
End: 2023-09-19
Payer: COMMERCIAL

## 2023-09-19 NOTE — TELEPHONE ENCOUNTER
General Call    Contacts         Type Contact Phone/Fax    09/19/2023 01:32 PM CDT Phone (Incoming) Tiffany Hernandez (Self) [phone] (M)          Reason for Call:  Jaron, she needs help resched something with or by Dr Duke?      Could we send this information to you in [place] or would you prefer to receive a phone call?:   Patient would prefer a phone call   Okay to leave a detailed message?: Yes at Cell number on file:    Telephone Information:   Mobile [phone]

## 2023-10-12 ENCOUNTER — TELEPHONE (OUTPATIENT)
Dept: SURGERY | Facility: CLINIC | Age: 43
End: 2023-10-12

## 2023-10-12 NOTE — TELEPHONE ENCOUNTER
Alerted by RF that pt not appropriate to be seen tomorrow as was asked to have a 3 month follow up visit in-clinic with MKD.    Visit tomorrow with RF is scheduled for virtual, and provider would not be doing anything other than reviewing task list.    Advised pt to keep December appt with MKD and get on wait list.    Called and spoke with pt- she is agreeable to cancel provider appt tomorrow and keep her appt with MKD in December.    She will get on the waitlist for cancellations.    She will keep her appt with Carmella tomorrow.    No further questions.    Tanna WILKERSON RN

## 2023-10-13 ENCOUNTER — VIRTUAL VISIT (OUTPATIENT)
Dept: SURGERY | Facility: CLINIC | Age: 43
End: 2023-10-13
Payer: COMMERCIAL

## 2023-10-13 DIAGNOSIS — E66.01 MORBID OBESITY (H): Primary | ICD-10-CM

## 2023-10-13 PROCEDURE — 97803 MED NUTRITION INDIV SUBSEQ: CPT | Mod: VID

## 2023-10-13 NOTE — PATIENT INSTRUCTIONS
Joni Allen!      It was great chatting with you again today! Here's a summary of the changes we should focus on over the next few visits. Try focusing on just 2-3 goals at a time:    1. Restart Vitamin D  - It appears this has been prescribed for you; contact your pharmacy to obtain a refill    2. Beverages:  - You will need to be caffeine-free and carbonation-free for surgery.   - You'll need to avoid drinking for 30 minutes before, during and after your meals    3. Eating behaviors:  - Continue eating slowly - you're spot-on with this currently!  - Chew all food to an applesauce consistency  - Avoid restaurant food/take-out/fast food - work on making the majority of your meals home-made. Most surgical patients do not tolerate fast food, and it may lead to weight gain even after bariatric surgery.    4. Exercise regularly  - Try getting some physical activity in outside of kids' activities  - Without regular, frequent exercise, surgical patients often struggle to maintain their weight beyond the first 1-2 years after surgery. So think of this as building a platform to work from      Plan on following up in 1 month. This can be scheduled via our call center at . Reach out sooner with any questions or concerns. Have a great day!      Carmella Negro RD, LD  Clinical Dietitian

## 2023-10-13 NOTE — PROGRESS NOTES
"Tiffany is a 43 year old who is being evaluated via a billable video visit.      How would you like to obtain your AVS? MyChart  If the video visit is dropped, the invitation should be resent by: Text to cell phone: [phone]  Will anyone else be joining your video visit? No          Video-Visit Details    Type of service:  Video Visit   Video Start Time:  8:34am  Video End Time: 8:51am    Originating Location (pt. Location): stationary vehicle    Distant Location (provider location):  Off-site  Platform used for Video Visit: [facility]     PRE SURGICAL WEIGHT LOSS NUTRITION APPOINTMENT    Tiffany Guevara  1980  female  [id number]  43 year old    ASSESSMENT    Desired Surgical Procedure: gastric sleeve    REASON FOR VISIT:  Tiffany Guevara is a 43 year old year old female presents today for a pre-surgical weight loss follow-up appointment. Patient accompanied by self.    DIAGNOSIS:  Weight Status Obesity Grade III BMI >40    ANTHROPOMETRICS:  Height: 65.5\"    Initial Weight: 310 lbs     Current weight: n/a    BMI: n/a    VITAMINS AND MINERALS:  1 Multivitamin with Minerals (afternoon)  600 mg Calcium with Vitamin D (BID; separate from MVT)      NUTRITION HISTORY:  Breakfast: [wakes at 6-6:30, eats at 8am] 2 pieces of sausage + eggs + toast  cream of wheat or oatmeal  Lunch: [11:30-12pm] African dish prepared by coworkers - soup + rice +/- fufu  sandwich + chips + fruit cup  fast food - [employer] - burger & fries   Supper: leftovers from weekend cooking - stew + salad  sloppy joes  usually eating out d/t kids' activities: fast food   Snacks: nuts, granola bar, yogurt, fruit    Fluids Consumed: water (32-48oz), diet coke (1-2 per day, less lately), carbonated water (occasional), coffee (1-3 cups/day; w/low-sugar creamer)  Eating slower: Yes  Chewing foods thoroughly: No  Take 20-30 minutes to consume each meal: Yes  Fluids and meals separate by at least 30 minutes: No  Carbonation: yes  Caffeine: " yes  Additional Information: Pt lost to follow up for several months. She was amidst several stressors, largely r/t moving to a new residence and family obligations. She is settled in her new home and feels ready to recommit to her health. Expectations for behavior change were reviewed.     PHYSICAL ACTIVITY:  No intentional exercise; somewhat active with kids during sports activities    DIAGNOSIS:  Previous Nutrition Diagnosis: Obesity related to long history of self- monitoring deficit and excessive energy intake evidenced by BMI of 50.8 kg/m2  No change, modified below    Previous goals:   (Remote)    Current Nutrition Diagnosis: Obesity related to long history of self-monitoring deficit and excessive energy intake as evidenced by previous BMI of >40 kg/m2.    INTERVENTION:  Nutrition Prescription: Recommended energy/nutrient modification.    GOALS:  Restart Vitamin D as prescribed  Chew food to applesauce consistency  Separate fluids from meals by 30 minutes  Eliminate caffeine and carbonation    Intervention:  - Discussed progress towards previous goals.  - Reinforced importance of making behavior changes in preparation for bariatric surgery.   - Assessed learning needs and learning preferences       NUTRITION MONITORING AND EVALUATION:  Anticipated compliance: fair  Patient demonstrated fair-good understanding.     Follow up: Continue to monitor patient closely regarding weight loss and diet.  # of visits needed: 2-3  Cleared by RD: No     TIME SPENT WITH PATIENT: 17 minutes      Carmella Negro RD, LD  Clinical Dietitian   HPI      MAEGAN      Physical Exam

## 2024-01-22 ENCOUNTER — OFFICE VISIT (OUTPATIENT)
Dept: URGENT CARE | Facility: URGENT CARE | Age: 44
End: 2024-01-22
Payer: COMMERCIAL

## 2024-01-22 ENCOUNTER — TELEPHONE (OUTPATIENT)
Dept: FAMILY MEDICINE | Facility: CLINIC | Age: 44
End: 2024-01-22
Payer: COMMERCIAL

## 2024-01-22 VITALS
OXYGEN SATURATION: 98 % | DIASTOLIC BLOOD PRESSURE: 91 MMHG | WEIGHT: 293 LBS | RESPIRATION RATE: 18 BRPM | BODY MASS INDEX: 51.29 KG/M2 | SYSTOLIC BLOOD PRESSURE: 147 MMHG | TEMPERATURE: 98 F | HEART RATE: 71 BPM

## 2024-01-22 DIAGNOSIS — S93.402A MODERATE LEFT ANKLE SPRAIN, INITIAL ENCOUNTER: Primary | ICD-10-CM

## 2024-01-22 PROCEDURE — 99213 OFFICE O/P EST LOW 20 MIN: CPT | Performed by: STUDENT IN AN ORGANIZED HEALTH CARE EDUCATION/TRAINING PROGRAM

## 2024-01-22 ASSESSMENT — PAIN SCALES - GENERAL: PAINLEVEL: EXTREME PAIN (8)

## 2024-01-22 NOTE — TELEPHONE ENCOUNTER
Patient Quality Outreach    Patient is due for the following:   Diabetes -  Microalbumin  Depression  -  PHQ-9 needed      Topic Date Due    Polio Vaccine (2 of 3 - 4-dose series) 01/31/1995    Hepatitis B Vaccine (2 of 3 - 19+ 3-dose series) 08/08/2023    COVID-19 Vaccine (3 - 2023-24 season) 09/01/2023       Next Steps:   Schedule a office visit for depression screen    Type of outreach:    Sent [employer] message.    Next Steps:  Reach out within 90 days via [employer].    Max number of attempts reached: Yes. Will try again in 90 days if patient still on fail list.    Questions for provider review:    None           Danni Bradshaw MA

## 2024-01-22 NOTE — PROGRESS NOTES
Assessment & Plan     Moderate left ankle sprain, initial encounter  - Ankle/Foot Bracing Supplies Order Ankle Brace; Left  - Crutches Order    Swelling and generalized tenderness of left ankle, [place] ankle rules negative so no imaging indicated. Discussed RICE, symptom management with patient and advised to return if symptoms do not improve in 1 week.     No follow-ups on file.    Radha Morrissey, DO  she/her  [facility]    Subjective     Tiffany Guevara is a 43 year old female who presents to clinic today for the following health issues:    HPI    MS Injury/Pain  This morning, was walking out of apartment building and slipped off sidewalk. Fell onto left side, left ankle bent and pain right afterwards, throbbing    Location: left ankle  No ice, elevation, pain meds  Injured her ankle in teenage years but not recently  Some pain with weight bearing  Sitting for a while causes pressure and soreness on the outside    Past Medical History:   Diagnosis Date    Arthritis     Chlamydia 2016    Essential hypertension 8/28/2020    Genital herpes 1999    Gestational diabetes mellitus (GDM) in third trimester     History of transfusion of packed RBC 2017    Hyperlipidemia 3/21/2019    Overview:  Created by Conversion    Morbid obesity (H) 9/6/2018    Subserous leiomyoma of uterus 3/21/2019     Allergies   Allergen Reactions    Phentermine Other (See Comments)     Hair loss    Sulfa Antibiotics Itching     Current Outpatient Medications   Medication    calcium carbonate-vitamin D (CALCIUM 600 + D) 600-10 MG-MCG per tablet    CERTAVITE/ANTIOXIDANTS tablet    Cholecalciferol (VITAMIN D) 50 MCG (2000 UT) CAPS    cholecalciferol 125 MCG (5000 UT) CAPS    docusate sodium (COLACE) 100 MG capsule    Fe Bisgly-Succ-C-Thre-B12-FA (IRON-150 PO)    ferrous fumarate 65 mg, [place]. FE,-Vitamin C 125 mg (VITRON C)  MG TABS tablet    hydrochlorothiazide (HYDRODIURIL) 25 MG tablet    ibuprofen (ADVIL/MOTRIN) 600  MG tablet    ibuprofen (ADVIL/MOTRIN) 600 MG tablet    labetalol (NORMODYNE) 300 MG tablet    Multiple Vitamins-Iron (QC DAILY MULTIVITAMINS/IRON) TABS    Multiple Vitamins-Minerals (MULTIVITAL PO)    naproxen (NAPROSYN) 500 MG tablet    polyethylene glycol (MIRALAX) 17 GM/Dose powder    Semaglutide-Weight Management (WEGOVY) 0.25 MG/0.5ML pen     No current facility-administered medications for this visit.      Review of Systems  Constitutional, HEENT, cardiovascular, pulmonary, gi and gu systems are negative, except as otherwise noted.      Objective    BP (!) 147/91 (BP Location: Left arm, Patient Position: Sitting, Cuff Size: Adult Large)   Pulse 71   Temp 98  F (36.7  C) (Tympanic)   Resp 18   Wt 142 kg (313 lb)   SpO2 98%   BMI 51.29 kg/m      Physical Exam  HENT:      Head: Normocephalic.      Mouth/Throat:      Mouth: Mucous membranes are moist.   Eyes:      Pupils: Pupils are equal, round, and reactive to light.   Cardiovascular:      Rate and Rhythm: Normal rate.   Pulmonary:      Effort: Pulmonary effort is normal.   Musculoskeletal:      Left ankle: Swelling present. No ecchymosis or lacerations. Tenderness present. Normal range of motion. Anterior drawer test negative. Normal pulse.      Left Achilles Tendon: Normal.   Neurological:      Mental Status: She is alert.        The use of Dragon/DEONTICS dictation services may have been used to construct the content in this note; any grammatical or spelling errors are non-intentional. Please contact the author of this note directly if you are in need of any clarification.DME (Durable Medical Equipment) Orders and Documentation  Orders Placed This Encounter   Procedures    Ankle/Foot Bracing Supplies Order Ankle Brace; Left    Crutches Order        The patient was assessed and it was determined the patient is in need of the following listed DME Supplies/Equipment. Please complete supporting documentation below to demonstrate medical necessity.

## 2024-02-07 ENCOUNTER — OFFICE VISIT (OUTPATIENT)
Dept: SURGERY | Facility: CLINIC | Age: 44
End: 2024-02-07
Payer: COMMERCIAL

## 2024-02-07 VITALS
SYSTOLIC BLOOD PRESSURE: 136 MMHG | OXYGEN SATURATION: 98 % | HEART RATE: 80 BPM | HEIGHT: 66 IN | DIASTOLIC BLOOD PRESSURE: 84 MMHG | BODY MASS INDEX: 47.09 KG/M2 | WEIGHT: 293 LBS

## 2024-02-07 DIAGNOSIS — E66.01 MORBID OBESITY (H): Primary | ICD-10-CM

## 2024-02-07 DIAGNOSIS — I10 ESSENTIAL HYPERTENSION: ICD-10-CM

## 2024-02-07 DIAGNOSIS — R73.03 PREDIABETES: ICD-10-CM

## 2024-02-07 PROCEDURE — 99214 OFFICE O/P EST MOD 30 MIN: CPT | Performed by: PHYSICIAN ASSISTANT

## 2024-02-07 RX ORDER — SEMAGLUTIDE 1 MG/.5ML
1 INJECTION, SOLUTION SUBCUTANEOUS WEEKLY
Qty: 2 ML | Refills: 1 | Status: SHIPPED | OUTPATIENT
Start: 2024-02-07 | End: 2024-02-07

## 2024-02-07 RX ORDER — SEMAGLUTIDE 0.5 MG/.5ML
0.5 INJECTION, SOLUTION SUBCUTANEOUS WEEKLY
Qty: 2 ML | Refills: 1 | Status: SHIPPED | OUTPATIENT
Start: 2024-02-07 | End: 2024-02-07

## 2024-02-07 RX ORDER — SEMAGLUTIDE 0.25 MG/.5ML
0.25 INJECTION, SOLUTION SUBCUTANEOUS WEEKLY
Qty: 2 ML | Refills: 0 | Status: SHIPPED | OUTPATIENT
Start: 2024-02-07 | End: 2024-02-07

## 2024-02-07 RX ORDER — SEMAGLUTIDE 1 MG/.5ML
1 INJECTION, SOLUTION SUBCUTANEOUS WEEKLY
Qty: 2 ML | Refills: 1 | Status: SHIPPED | OUTPATIENT
Start: 2024-02-07 | End: 2024-05-24

## 2024-02-07 RX ORDER — SEMAGLUTIDE 0.5 MG/.5ML
0.5 INJECTION, SOLUTION SUBCUTANEOUS WEEKLY
Qty: 2 ML | Refills: 1 | Status: SHIPPED | OUTPATIENT
Start: 2024-02-07 | End: 2024-05-24

## 2024-02-07 RX ORDER — SEMAGLUTIDE 0.25 MG/.5ML
0.25 INJECTION, SOLUTION SUBCUTANEOUS WEEKLY
Qty: 2 ML | Refills: 0 | Status: SHIPPED | OUTPATIENT
Start: 2024-02-07 | End: 2024-04-01

## 2024-02-07 NOTE — ASSESSMENT & PLAN NOTE
Switching to medical management. Discussed Wegovy - Discussed mechanism of action, common side effects, titration guidelines, and monitoring for pancreatitis/gallbladder problems/low sugars/MTC/MEN2. Medication handout given in AVS. Aware of shortages but improving. Very inconsistent with pre-surgical process so putting that on hold for now to establish consistency/reliability first.

## 2024-02-07 NOTE — PROGRESS NOTES
Pre-Bariatric Surgery Note    RE: Tiffany Guevara  MR#: [id number]  : 1980  VISIT DATE: 2024    Dear Clinic - [facility],    I had the pleasure of seeing your patient, Tiffany Guevara, in my post-bariatric surgery assessment clinic.    Assessment & Plan   Problem List Items Addressed This Visit       Morbid obesity (H) - Primary     Switching to medical management. Discussed Wegovy - Discussed mechanism of action, common side effects, titration guidelines, and monitoring for pancreatitis/gallbladder problems/low sugars/MTC/MEN2. Medication handout given in AVS. Aware of shortages but improving. Very inconsistent with pre-surgical process so putting that on hold for now to establish consistency/reliability first.         Relevant Medications    Semaglutide-Weight Management (WEGOVY) 0.25 MG/0.5ML pen    Semaglutide-Weight Management (WEGOVY) 0.5 MG/0.5ML pen    Semaglutide-Weight Management (WEGOVY) 1 MG/0.5ML pen    Prediabetes     Discussed in clinic visit. Anticipate improvement with weight loss.  Start Wegovy         Relevant Medications    Semaglutide-Weight Management (WEGOVY) 0.25 MG/0.5ML pen    Semaglutide-Weight Management (WEGOVY) 0.5 MG/0.5ML pen    Semaglutide-Weight Management (WEGOVY) 1 MG/0.5ML pen        PATIENT INSTRUCTIONS:  Plan:  Follow-up with primary care for your anemia/low iron concerns.  Start Wegovy (semaglutide)   0.25 mg once weekly for 4 weeks,   if tolerating increase to 0.5 mg weekly for 4 weeks,   if tolerating increase to 1 mg weekly    May use famotidine 20 mg twice daily as needed for nausea/heartburn when starting the medication.     If you get your medication pen and have questions about doing the injection - call or Mychart our office for the nurses to set up a virtual teaching session with you.      Goals:  I recommend eating breakfast within an hour of waking up and eating every 4-6 hours during the day to keep your metabolism up.  "Prioritizing veggies and proteins for food choices is also recommended as medically appropriate.  Recommend 64oz (2L) water daily as medically appropriate (6-8 glasses)  Recommend pursing regular exercise. 5 minutes of exercise every other day or every 2 days is a great place to start with aiming for 30 minutes 5 times a week as an end goal.  If you can, try to get some strength training twice weekly while losing weight to help preserve your muscle!      FOLLOW-UP:    Please call [phone] to schedule your next visit in 3 months.    28 minutes spent on the date of the encounter doing chart review, history and exam, result review, counseling, developing plan of care, documentation, and further activities as noted:      HPI: Here for pre-surgical restart - has forgotten appointments and also had a lot going on so hasn't made visits. Open to putting a pause on surgery and looking at medications for now to establish consistency:    AOM Considerations:  Phentermine:  Tried and hair loss  Topiramate:    GLP-1:  Denies Medullary Thyroid Ca (incl Fhx), MEN type II, denies pt was pancreatitis Hx, gallbladder problems or gastroparesis.    Naltrexone:    Wellbutrin:    Metformin:    Contrave:  Qsymia:    Discussed mechanism of action, common side effects, titration guidelines, and monitoring for pancreatitis/gallbladder problems/low sugars/MTC/MEN2. Medication handout given in AVS.        LABS/IMAGING/MEDICAL RECORDS REVIEW:   Reviewed    12/21/23 BMP glucose 109 ow WNL  6/1/23 hgA1c 6.0 preDM        PHYSICAL EXAMINATION:  /84   Pulse 80   Ht 5' 5.5\" (1.664 m)   Wt 313 lb (142 kg)   SpO2 98%   BMI 51.29 kg/m    GENERAL: Healthy, alert and no distress  EYES: Eyes grossly normal to inspection.    RESP: No audible wheeze, cough, or visible cyanosis.  No increased work of breathing.    SKIN: Visible skin clear. No significant rash, abnormal pigmentation or lesions.  NEURO: Mentation and speech appropriate for " age.  PSYCH: Mentation appears normal, affect normal/bright, judgement and insight intact, normal speech and appearance well-groomed.      Sincerely,      Justina Nascimento PA-C

## 2024-02-07 NOTE — PATIENT INSTRUCTIONS
"To ensure quality you may receive a patient satisfaction survey. The greatest compliment you can give is \"Likely to Recommend.\"    Nice to talk with you today.  Thank you for your trust. Below is the plan discussed.-  Justina Nascimento PA-C     Plan:  Follow-up with primary care for your anemia/low iron concerns.  Start Wegovy (semaglutide)   0.25 mg once weekly for 4 weeks,   if tolerating increase to 0.5 mg weekly for 4 weeks,   if tolerating increase to 1 mg weekly    May use famotidine 20 mg twice daily as needed for nausea/heartburn when starting the medication.     If you get your medication pen and have questions about doing the injection - call or Mychart our office for the nurses to set up a virtual teaching session with you.      Goals:  I recommend eating breakfast within an hour of waking up and eating every 4-6 hours during the day to keep your metabolism up. Prioritizing veggies and proteins for food choices is also recommended as medically appropriate.  Recommend 64oz (2L) water daily as medically appropriate (6-8 glasses)  Recommend pursing regular exercise. 5 minutes of exercise every other day or every 2 days is a great place to start with aiming for 30 minutes 5 times a week as an end goal.  If you can, try to get some strength training twice weekly while losing weight to help preserve your muscle!      FOLLOW-UP:    Please call [phone] to schedule your next visit in 3 months.    WEGOVY (semaglutide)      Wegovy (semaglutide) injection 2.4 mg is an injectable prescription medicine used for adults with obesity (BMI ?30) or overweight (excess weight) (BMI ?27) who also have weight-related medical problems to help them lose weight and keep the weight off.  It is a GLP-1 agonist medication. GLP1 agonists stimulate the hormone GLP-1 in your body o allow you to feel full quickly and stay full longer.    Due to the shortage, You may need to be persistent and patient to get these initial dosages due to " "the shortage.  Once you are able to obtain the 0.25 and 0.5 mg and 1 mg dose \"12 weeks of therapy\" you can begin treatment.     Directions:  Start Wegovy (semaglutide) 0.25 mg once weekly for 4 weeks, then if tolerating increase to 0.5 mg weekly for 4 weeks, then if tolerating increase to 1 mg weekly for 4 weeks, then if tolerating increase to 1.7 mg weekly for 4 weeks, then if tolerating increase to 2.4 mg weekly thereafter.      -Each Wegovy pen is a once weekly single-dose prefilled pen with a pen injector already built within the pen. Discard the Wegovy pen after use in sharps container.     Common Side Effects:    nausea, headache, diarrhea, stomach upset.  If these become unmanageable call or mychart.    Serious Side effects:   Pancreatitis (inflammation of the pancreas) has been associated with this type of medication, but is very rare.Symptoms of pancreatitis include: Pain in your upper stomach area which may travel to your back and be worse after eating.     Storage:   Store the prescription in the refrigerator. Once it is time to use the Wegovy pen, you can keep the pen at room temperature and it is good for up to 28 days at room temperature.     How to inject:  For a video on how to use the pen please go to:  https://[email or web address]/about-wegovy/how-to-use-the-wegovy-pen.html#itemTwo       For any questions or concerns please send a [employer] message to our team or call our weight management call center at [phone] during regular business hours. For questions during evenings or weekends your messages will be addressed during the next business day.  For emergencies please call 911 or seek immediate medical care.     There is a small chance you may have some low blood sugar after taking the medication.   The signs of low blood sugar are:  Weakness  Shaky   Hungry  Sweating  Confusion      See below for ways to treat low blood sugar without adding in lots of extra calories.      Treating Low Blood " Sugar    If you have symptoms of low blood sugar (sweating, shaking, dizzy, confused) eat 15 grams of carbs and wait 15 minutes:    Glucose Tabs are best for sugars under 70 -  Dex4 or BD Glucose tablets are good, you will need to take 3-4 of these to equal 15 grams.     One small box of raisins  4 oz fruit juice box or   cup fruit juice  1 small apple  1 small banana    cup canned fruit in water    English muffin or a slice of bread with jelly   1 low fat frozen waffle with sugar-free syrup    cup cottage cheese with   cup frozen or fresh blueberries  1 cup skim or low-fat milk    cup whole grain cereal  4-6 crackers such as Triscuits      This medication is usually not covered by insurance and can be quite expensive. Sometimes a prior authorization is required, which may take up to 1-2 weeks for an insurance company to make a decision if they will cover the medication. Please be patient, you will be notified after a decision has been made.    Contact the nurse via [employer] or call [phone] if you have any questions or concerns. (Do not stop taking it if you don't think it's working. For some people it works without them knowing it.)     In order to get refills of this or any medication we prescribe you must be seen in the medical weight mgmt clinic every 2-4 months.    Using Your Wegovy Pen  Medicine (semaglutide)    Storing your pens  Store your pens in the refrigerator until you are ready to use them. Don't let them freeze.  Your pen may be stored at room temperature for 28 days or fewer. Just make sure the temperature doesn't get higher than 86 or lower than 46 degrees Fahrenheit.   Protect the pens from light.  Never use any pens that have .    Check your pen before use:  The liquid in the pen window should be clear and colorless. Bubbles are okay to see.   Do not use the pen if you can see the window contains any specks or it is cloudy or has changed color.  Make sure you have the medication and  dose your health care provider prescribed.    Getting ready to inject:   1. Wash and dry your hands well.  2. Make sure the counter you use to place your supplies on is clean.  3. Make sure your injection time will not be interrupted by children or pets.  4. Have alcohol wipes or alcohol and cotton balls available to clean the injection site.   5. Choose your injection site. It can be on your stomach, back of upper arms, or upper legs. Remember to change your injection site each time you inject. Try to be at least 1 inch away from the previous one. Stay at least 1 inch away from your belly button.     Inject your dose:  1. Pull off the grey cap off the pen. Throw it in the trash. Do not put the cap back on the pen.   2. Clean the injection site with alcohol.   3. Push the grey part of the pen firmly against your skin. This will start the injection.  4. When the pen is injecting, you will hear 2 clicks. The first click tells you the injection has started. The second one tells you the injection is continuing.   5. The injection is done when the yellow bar in the glass window at the bottom of the pen has stopped moving.   6. This injection is given 1 day a week. The pens come in  5 doses ranging from 0.25 mg to 2.4 mg. Each dose comes in a different color pen.  7. If you miss a dose, take is as soon as you remember. Do not take a missed dose, if it is within 2 days of the next dose.    8. Your injection may be best tolerated if given at night.     Disposing of your pens:  Dispose of your pens in a puncture-resistant container (hard plastic bottle) or Sharps container.  Check with the county you live in on how to dispose of the container.  Do not recycle the container with used pens.     Of note, you may not be able to  your medication right away. It may require a prior authorization from your insurance company. This may take a week or more.

## 2024-02-13 RX ORDER — LABETALOL 300 MG/1
TABLET, FILM COATED ORAL
Qty: 180 TABLET | Refills: 0 | Status: SHIPPED | OUTPATIENT
Start: 2024-02-13

## 2024-02-22 ENCOUNTER — ALLIED HEALTH/NURSE VISIT (OUTPATIENT)
Dept: FAMILY MEDICINE | Facility: CLINIC | Age: 44
End: 2024-02-22
Payer: COMMERCIAL

## 2024-02-22 DIAGNOSIS — Z23 ENCOUNTER FOR IMMUNIZATION: Primary | ICD-10-CM

## 2024-02-22 PROCEDURE — 99207 PR NO CHARGE NURSE ONLY: CPT

## 2024-02-22 PROCEDURE — 90471 IMMUNIZATION ADMIN: CPT

## 2024-02-22 PROCEDURE — 90746 HEPB VACCINE 3 DOSE ADULT IM: CPT

## 2024-02-22 PROCEDURE — 90472 IMMUNIZATION ADMIN EACH ADD: CPT

## 2024-02-22 PROCEDURE — 90713 POLIOVIRUS IPV SC/IM: CPT

## 2024-02-22 NOTE — PROGRESS NOTES
Prior to immunization administration, verified patients identity using patient s name and date of birth. Please see Immunization Activity for additional information.     Screening Questionnaire for Adult Immunization    Are you sick today?   No   Do you have allergies to medications, food, a vaccine component or latex?   No   Have you ever had a serious reaction after receiving a vaccination?   No   Do you have a long-term health problem with heart, lung, kidney, or metabolic disease (e.g., diabetes), asthma, a blood disorder, no spleen, complement component deficiency, a cochlear implant, or a spinal fluid leak?  Are you on long-term aspirin therapy?   No   Do you have cancer, leukemia, HIV/AIDS, or any other immune system problem?   No   Do you have a parent, brother, or sister with an immune system problem?   No   In the past 3 months, have you taken medications that affect  your immune system, such as prednisone, other steroids, or anticancer drugs; drugs for the treatment of rheumatoid arthritis, Crohn s disease, or psoriasis; or have you had radiation treatments?   No   Have you had a seizure, or a brain or other nervous system problem?   No   During the past year, have you received a transfusion of blood or blood    products, or been given immune (gamma) globulin or antiviral drug?   No   For women: Are you pregnant or is there a chance you could become       pregnant during the next month?   No   Have you received any vaccinations in the past 4 weeks?   No     Immunization questionnaire answers were all negative.    I have reviewed the following standing orders:   This patient is due and qualifies for the Hepatitis B vaccine.    Click here for Hepatitis B Standing Order    I have reviewed the vaccines inclusion and exclusion criteria; No concerns regarding eligibility.     Patient instructed to remain in clinic for 15 minutes afterwards, and to report any adverse reactions.     Screening performed by  Leslee Patterson MA on 2/22/2024 at 10:23 AM.

## 2024-04-01 ENCOUNTER — OFFICE VISIT (OUTPATIENT)
Dept: SURGERY | Facility: CLINIC | Age: 44
End: 2024-04-01
Payer: COMMERCIAL

## 2024-04-01 VITALS
HEART RATE: 69 BPM | BODY MASS INDEX: 47.09 KG/M2 | DIASTOLIC BLOOD PRESSURE: 78 MMHG | HEIGHT: 66 IN | SYSTOLIC BLOOD PRESSURE: 134 MMHG | WEIGHT: 293 LBS | OXYGEN SATURATION: 98 %

## 2024-04-01 DIAGNOSIS — R73.03 PREDIABETES: ICD-10-CM

## 2024-04-01 DIAGNOSIS — K59.00 CONSTIPATION, UNSPECIFIED CONSTIPATION TYPE: ICD-10-CM

## 2024-04-01 DIAGNOSIS — I10 ESSENTIAL HYPERTENSION: ICD-10-CM

## 2024-04-01 DIAGNOSIS — E66.01 MORBID OBESITY WITH BMI OF 50.0-59.9, ADULT (H): Primary | ICD-10-CM

## 2024-04-01 DIAGNOSIS — E78.5 HYPERLIPIDEMIA, UNSPECIFIED HYPERLIPIDEMIA TYPE: ICD-10-CM

## 2024-04-01 PROCEDURE — 99214 OFFICE O/P EST MOD 30 MIN: CPT | Performed by: PHYSICIAN ASSISTANT

## 2024-04-01 RX ORDER — SEMAGLUTIDE 1.7 MG/.75ML
1.7 INJECTION, SOLUTION SUBCUTANEOUS WEEKLY
Qty: 3 ML | Refills: 3 | Status: SHIPPED | OUTPATIENT
Start: 2024-04-01 | End: 2024-07-26

## 2024-04-01 RX ORDER — POLYETHYLENE GLYCOL 3350 17 G/17G
1 POWDER, FOR SOLUTION ORAL DAILY PRN
Qty: 255 G | Refills: 0 | Status: SHIPPED | OUTPATIENT
Start: 2024-04-01

## 2024-04-01 NOTE — PATIENT INSTRUCTIONS
"Nice to talk with you today. Below is the plan discussed.- Justina Nascimento PA-C   Pt Instructions:  Continue Wegovy titration. Start 1.0 mg once weekly next week. After doing the 1.0 mg dose for at least 1 month and then increase to 1.7 mg dose until I see you again.  Start some Miralax - one capful daily until stools are loose and then taper down.   Establish for primary care for anemia/iron deficiency concerns.       Goals:  Continue Focus on healthy food choices - prioritizing protein and veggies in your meals. I recommend eating every 4-6 hours to reduce the risk of low blood sugars and keep your metabolism up during the day.  Recommend pursing regular exercise. 5 minutes of exercise every other day or every 2 days is a great place to start with aiming for 30 minutes 5 times a week as an end goal.  If you can, try to get some strength training twice weekly while losing weight to help preserve your muscle!  Recommend 64oz (2L) water daily as medically appropriate (6-8 glasses)  Focus on healthy food choices - prioritizing protein and veggies in your meals. I recommend eating every 4-6 hours to reduce the risk of low blood sugars and keep your metabolism up during the day.        Follow up:    Call [phone] to schedule next visit in 3-4 months      WEGOVY (semaglutide)      Wegovy (semaglutide) injection 2.4 mg is an injectable prescription medicine used for adults with obesity (BMI ?30) or overweight (excess weight) (BMI ?27) who also have weight-related medical problems to help them lose weight and keep the weight off.  It is a GLP-1 agonist medication. GLP1 agonists stimulate the hormone GLP-1 in your body o allow you to feel full quickly and stay full longer.    Due to the shortage, You may need to be persistent and patient to get these initial dosages due to the shortage.  Once you are able to obtain the 0.25 and 0.5 mg dose \"8 weeks of therapy\" you can begin treatment.     Directions:  Start Wegovy " (semaglutide) 0.25 mg once weekly for 4 weeks, then if tolerating increase to 0.5 mg weekly for 4 weeks, then if tolerating increase to 1 mg weekly for 4 weeks, then if tolerating increase to 1.7 mg weekly for 4 weeks, then if tolerating increase to 2.4 mg weekly thereafter.      -Each Wegovy pen is a once weekly single-dose prefilled pen with a pen injector already built within the pen. Discard the Wegovy pen after use in sharps container.     Common Side Effects:    nausea, headache, diarrhea, stomach upset.  If these become unmanageable call or mychart.    Serious Side effects:   Pancreatitis (inflammation of the pancreas) has been associated with this type of medication, but is very rare.Symptoms of pancreatitis include: Pain in your upper stomach area which may travel to your back and be worse after eating.     Storage:   Store the prescription in the refrigerator. Once it is time to use the Wegovy pen, you can keep the pen at room temperature and it is good for up to 28 days at room temperature.     How to inject:  For a video on how to use the pen please go to:  https://www.[employer]/about-wegovy/how-to-use-the-wegovy-pen.html#itemTwo       For any questions or concerns please send a [employer] message to our team or call our weight management call center at [phone] during regular business hours. For questions during evenings or weekends your messages will be addressed during the next business day.  For emergencies please call 911 or seek immediate medical care.       There is a small chance you may have some low blood sugar after taking the medication.   The signs of low blood sugar are:  Weakness  Shaky   Hungry  Sweating  Confusion      See below for ways to treat low blood sugar without adding in lots of extra calories.      Treating Low Blood Sugar    If you have symptoms of low blood sugar (sweating, shaking, dizzy, confused) eat 15 grams of carbs and wait 15 minutes:    Glucose Tabs are best for  sugars under 70 -  Dex4 or BD Glucose tablets are good, you will need to take 3-4 of these to equal 15 grams.     One small box of raisins  4 oz fruit juice box or   cup fruit juice  1 small apple  1 small banana    cup canned fruit in water    English muffin or a slice of bread with jelly   1 low fat frozen waffle with sugar-free syrup    cup cottage cheese with   cup frozen or fresh blueberries  1 cup skim or low-fat milk    cup whole grain cereal  4-6 crackers such as Triscuits      This medication is usually not covered by insurance and can be quite expensive. Sometimes a prior authorization is required, which may take up to 1-2 weeks for an insurance company to make a decision if they will cover the medication. Please be patient, you will be notified after a decision has been made.    Contact the nurse via [facility] or call [phone] if you have any questions or concerns. (Do not stop taking it if you don't think it's working. For some people it works without them knowing it.)     In order to get refills of this or any medication we prescribe you must be seen in the medical weight mgmt clinic every 2-4 months.

## 2024-04-01 NOTE — ASSESSMENT & PLAN NOTE
Patient was congratulated on wt loss success thus far. Healthy habits to assist with further weight loss were discussed. Tiffany will continue Wegovy titration.

## 2024-04-01 NOTE — PROGRESS NOTES
2024      Medical Weight Management Note     Tiffany Guevara  MRN:  [id number]  :  1980    Assessment & Plan   Problem List Items Addressed This Visit       Morbid obesity with BMI of 50.0-59.9, adult (H) - Primary     Patient was congratulated on wt loss success thus far. Healthy habits to assist with further weight loss were discussed. Tiffany will continue Wegovy titration.           Relevant Medications    Semaglutide-Weight Management (WEGOVY) 1.7 MG/0.75ML pen    polyethylene glycol (MIRALAX) 17 GM/Dose powder    Hyperlipidemia     Discussed in clinic visit. Anticipate improvement with weight loss.           Relevant Medications    Semaglutide-Weight Management (WEGOVY) 1.7 MG/0.75ML pen    Essential hypertension     Discussed in clinic visit. Anticipate improvement with weight loss.         Relevant Medications    Semaglutide-Weight Management (WEGOVY) 1.7 MG/0.75ML pen    Prediabetes     Discussed in clinic visit. Anticipate improvement with weight loss.         Relevant Medications    Semaglutide-Weight Management (WEGOVY) 1.7 MG/0.75ML pen     Other Visit Diagnoses       Constipation, unspecified constipation type        Relevant Medications    polyethylene glycol (MIRALAX) 17 GM/Dose powder             AOM Considerations:  Phentermine:   Tried and hair loss  Topiramate:       GLP-1:        Denies Medullary Thyroid Ca (incl Fhx), MEN type II, denies pt was pancreatitis Hx, gallbladder problems or gastroparesis.      Naltrexone:        Wellbutrin:         Metformin:         Contrave:  Qsymia:     Discussed mechanism of action, common side effects, titration guidelines, and monitoring for pancreatitis/gallbladder problems/low sugars/MTC/MEN2. Medication handout given in AVS.    PATIENT INSTRUCTIONS:  Pt Instructions:  Continue Wegovy titration. Start 1.0 mg once weekly next week. After doing the 1.0 mg dose for at least 1 month and then increase to 1.7 mg dose until I see you again.  Start  some Miralax - one capful daily until stools are loose and then taper down.   Establish for primary care for anemia/iron deficiency concerns.       Goals:  Continue Focus on healthy food choices - prioritizing protein and veggies in your meals. I recommend eating every 4-6 hours to reduce the risk of low blood sugars and keep your metabolism up during the day.  Recommend pursing regular exercise. 5 minutes of exercise every other day or every 2 days is a great place to start with aiming for 30 minutes 5 times a week as an end goal.  If you can, try to get some strength training twice weekly while losing weight to help preserve your muscle!  Recommend 64oz (2L) water daily as medically appropriate (6-8 glasses)  Focus on healthy food choices - prioritizing protein and veggies in your meals. I recommend eating every 4-6 hours to reduce the risk of low blood sugars and keep your metabolism up during the day.        Follow up:    Call [phone] to schedule next visit in 3-4 months    33 minutes spent on the date of the encounter doing chart review, history and exam, result review, counseling, developing plan of care, documentation, and further activities as noted      INTERVAL HISTORY:  Tiffany returns for medical weight management follow up.  Last seen on 2/7/2024 by myself and plan at that time was that she was going to switch to medical management with Wegovy.  She previously had been working on presurgery but had forgotten appointments and had a lot going on so has not been able to make visits and so was going to put a pause on surgery to work on medications and establish consistency.    Was hard to find Wegovy initially but now does have it and will start 1.0 mg next week.     Working on healthy food choices, but hard - feeling some cravings.    Occasional heart burn - just once the other day.     Cutting back on pop as well.     Unsure if she still wants to do surgery - friend having health complications after  "surgery.     WEIGHT METRICS:  Body mass index is 51.24 kg/m .   Current Weight: 312 lb 11.2 oz (141.8 kg)  Last Visits Weight: 313 lb (142 kg)  Initial Weight (lbs): 305 lbs  Cumulative weight loss (lbs): -7.7  Weight Loss Percentage: -2.52%    Wt Readings from Last 10 Encounters:   04/01/24 312 lb 11.2 oz (141.8 kg)   02/07/24 313 lb (142 kg)   01/22/24 313 lb (142 kg)   10/12/23 310 lb (140.6 kg)   06/01/23 308 lb 6.4 oz (139.9 kg)   05/23/23 310 lb (140.6 kg)   05/23/23 310 lb (140.6 kg)   10/28/22 307 lb 6.4 oz (139.4 kg)   10/06/22 310 lb 6 oz (140.8 kg)   09/16/22 309 lb 6 oz (140.3 kg)           Initially felt the effects and reduced amount. Often eats twice a day and water, but then more urges to 'eat stupid stuff'.    Still bought lots of fruits and veggies and incorporating in meals. Felt she had lost close to 8 lbs. Felt that water weight had come down - clothes fitting different.     LABS:  Reviewed in Epic    12/23 BMP glucose 109 ow WNL    6/1/23 hgA1c 6.0    BP Readings from Last 6 Encounters:   04/01/24 134/78   02/07/24 136/84   01/22/24 (!) 147/91   06/01/23 129/74   10/30/22 138/51   10/28/22 138/78       Pulse Readings from Last 6 Encounters:   04/01/24 69   02/07/24 80   01/22/24 71   06/01/23 81   10/30/22 63   10/28/22 70       PE:  /78   Pulse 69   Ht 5' 5.5\" (1.664 m)   Wt 312 lb 11.2 oz (141.8 kg)   SpO2 98%   BMI 51.24 kg/m    GENERAL: Healthy, alert and no distress  EYES: Eyes grossly normal to inspection.    RESP: No audible wheeze, cough, or visible cyanosis.  No increased work of breathing.    SKIN: Visible skin clear. No significant rash, abnormal pigmentation or lesions.  NEURO: Mentation and speech appropriate for age.  PSYCH: Mentation appears normal, affect normal/bright, judgement and insight intact, normal speech and appearance well-groomed.      Sincerely,      Justina Nascimento PA-C         "

## 2024-05-02 ENCOUNTER — PATIENT OUTREACH (OUTPATIENT)
Dept: CARE COORDINATION | Facility: CLINIC | Age: 44
End: 2024-05-02
Payer: COMMERCIAL

## 2024-05-16 ENCOUNTER — PATIENT OUTREACH (OUTPATIENT)
Dept: CARE COORDINATION | Facility: CLINIC | Age: 44
End: 2024-05-16

## 2024-05-22 ENCOUNTER — OFFICE VISIT (OUTPATIENT)
Dept: OBGYN | Facility: CLINIC | Age: 44
End: 2024-05-22
Payer: COMMERCIAL

## 2024-05-22 VITALS
BODY MASS INDEX: 50.06 KG/M2 | SYSTOLIC BLOOD PRESSURE: 145 MMHG | DIASTOLIC BLOOD PRESSURE: 87 MMHG | WEIGHT: 293 LBS | OXYGEN SATURATION: 99 % | HEART RATE: 79 BPM

## 2024-05-22 DIAGNOSIS — Z12.31 ENCOUNTER FOR SCREENING MAMMOGRAM FOR BREAST CANCER: Primary | ICD-10-CM

## 2024-05-22 DIAGNOSIS — Z01.419 ENCOUNTER FOR GYNECOLOGICAL EXAMINATION WITHOUT ABNORMAL FINDING: ICD-10-CM

## 2024-05-22 PROCEDURE — 99396 PREV VISIT EST AGE 40-64: CPT | Performed by: OBSTETRICS & GYNECOLOGY

## 2024-05-22 PROCEDURE — 99459 PELVIC EXAMINATION: CPT | Performed by: OBSTETRICS & GYNECOLOGY

## 2024-05-22 NOTE — PATIENT INSTRUCTIONS
If you have any questions regarding your visit, Please contact your care team.     [employer]: [phone]    To Schedule an Appointment 24/7  Call: [phone]     Vince Estrada MD  Medical Director        Lima-REA Wiley-RN  Mariya Grubbs-Surgery Scheduler  Joanna-Surgery Scheduler               Tuesday-[place]  7:30 a.m-4:30 p.m    Thursday-[place]  7:30 a.m-4:30 p.m    Typical Surgery Days: Tuesday or Friday [facility]  [address] [place] [address]  PH: [phone]    Imaging Scheduling all locations  PH: [phone]     [facility]  [address] [place] [address] [place] [address]  PH: [phone]    [facility]  [address] [place] [address]  PH: [phone] Fax      **Surgeries** Our Surgery Schedulers will contact you to schedule. If you do not receive a call within 3 business days, please call [phone].    Urgent Care locations:  [facility] Monday-Friday  10 am - 8 pm  Saturday and Sunday   9 am - 5 pm  Monday-Friday   10 am - 8 pm  Saturday and Sunday   9 am - 5 pm   [phone]   If you need a medication refill, please contact your pharmacy. Please allow 3 business days for your refill to be completed.  As always, Thank you for trusting us with your healthcare needs!    see additional instructions from your care team below

## 2024-05-22 NOTE — PROGRESS NOTES
Tiffany is a 43 year old  here for annual exam. She is currently using tubal for birth control.      ROS: Ten point review of systems was reviewed and negative except the above.      Last paps:    Lab Results   Component Value Date    PAP NIL 10/08/2020    PAP NIL 2009     Last cholesterol:   Cholesterol   Date Value Ref Range Status   2017 217 (H) <=199 mg/dL Final   ]        Past Surgical History:   Procedure Laterality Date     SECTION       SECTION       SECTION, TUBAL LIGATION, COMBINED      HC INSERT IMPLANTABLE CONTRACEPTIVE CAPSULE      Nexplanon    HC INSERTION INTRAUTERINE DEVICE      ParaGard    HC REMOVAL IMPLATABLE CONTRACEPTIVE CAPSULE      HC REMOVE INTRAUTERINE DEVICE      INCISION/DRAIN ABSCESS EXTRA  2019    skin of right breast    TUBAL LIGATION Bilateral     TUBAL LIGATION  2020     Past Medical History:   Diagnosis Date    Arthritis     Chlamydia 2016    Essential hypertension 2020    Genital herpes     Gestational diabetes     Gestational diabetes mellitus (GDM) in third trimester     History of transfusion of packed RBC     Hyperlipidemia 2019    Overview:  Created by Conversion    Morbid obesity (H) 2018    Subserous leiomyoma of uterus 2019        .     Allergies   Allergen Reactions    Phentermine Other (See Comments)     Hair loss    Sulfa Antibiotics Itching       Family History   Adopted: Yes   Family history unknown: Yes   Problem Relation Age of Onset    Family history unknown: Yes    Substance Abuse Mother     Mental Illness Mother     Diabetes Paternal Grandfather     Heart Disease Paternal Grandfather     Heart Disease Maternal Aunt      Social History     Socioeconomic History    Marital status:      Spouse name: stephanie Teresaed    Number of children: 3    Years of education: Not on file    Highest education level: Not on file   Occupational History    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 No   Social History Narrative    Not on file     Social Determinants of Health     Financial Resource Strain: Not on file   Food Insecurity: Not on file   Transportation Needs: Not on file   Physical Activity: Not on file   Stress: Not on file   Social Connections: Not on file   Interpersonal Safety: Not on file   Housing Stability: Not on file           PE: BP (!) 145/87 (BP Location: Right arm, Patient Position: Sitting, Cuff Size: Adult Large)   Pulse 79   Wt 138.6 kg (305 lb 8 oz)   SpO2 99%   BMI 50.06 kg/m    Body mass index is 50.06 kg/m .    General Appearance:  healthy, alert, active, no distress  Cardiovascular:  Regular rate and Rhythm  Neck: Supple, no adenopathy, and thyroid normal  Lungs:  Clear, without wheeze, rale or rhonchi  Breast: normal breast exam  Abdomen: Benign, Soft, flat, non-tender, No masses, organomegaly, No inguinal nodes, and Bowel sounds normoactive.   Pelvic:       - Ext: Vulva and perineum are normal without lesion, mass or discharge        - Urethra: normal without discharge or scarring no hypermobility       - Bladder: No tenderness, No masses       - Vagina: without discharge and rugated       - Cervix: normal       - Uterus:Mildly enlarged, fibroids palpable anteriorly       - Adnexa: Normal without masses or tenderness       - Rectal: deferred    A/P Well Woman,      -  I discussed the new pap recommendations regarding screening.  Explained the rationale for increased intervals between paps.  Questions asked and answered.  She does agree to this regiment.    - Pap was not performed   -  BC: tubal  (Z12.31)  Encounter for screening mammogram for breast cancer  (primary encounter diagnosis)  Comment:   Plan: MA Screen Bilateral w/Oli            (Z01.419) Encounter for gynecological examination without abnormal finding  Comment:   Plan: Lipid panel reflex to direct LDL Non-fasting,         HEMOGLOBIN A1C                 - Labs: No orders of the defined types were placed in this encounter.      -  Encouraged healthy diet, regular exercise, self-breast examination.  Vince Estrada MD FACOG

## 2024-05-22 NOTE — PROGRESS NOTES
2024      Return Medical Weight Management Note     Tiffany Guevara  MRN:  [id number]  :  1980    Assessment & Plan   Problem List Items Addressed This Visit       Morbid obesity with BMI of 50.0-59.9, adult (H) - Primary     Patient was congratulated on wt loss success thus far. Healthy habits to assist with further weight loss were discussed. Tiffany will continue Wegovy titration.           Relevant Medications    Semaglutide-Weight Management (WEGOVY) 2.4 MG/0.75ML pen    Other Relevant Orders    Basic metabolic panel    Hyperlipidemia    Relevant Medications    Semaglutide-Weight Management (WEGOVY) 2.4 MG/0.75ML pen    Essential hypertension    Relevant Medications    Semaglutide-Weight Management (WEGOVY) 2.4 MG/0.75ML pen    Prediabetes    Relevant Medications    Semaglutide-Weight Management (WEGOVY) 2.4 MG/0.75ML pen        AOM Considerations:  Phentermine:   Tried and hair loss  Topiramate:       GLP-1:        Denies Medullary Thyroid Ca (incl Fhx), MEN type II, denies pt was pancreatitis Hx, gallbladder problems or gastroparesis.      Naltrexone:        Wellbutrin:         Metformin:         Contrave:  Qsymia:      PATIENT INSTRUCTIONS:  Pt Instructions:  Continue Wegovy and increase to 2.4 mg once Wegovy.   Labs ordered.  Please call [phone] to set up a lab appt.       Goals:  Aim for eating only once or twice a week at a restaurant.  Focus on healthy food choices - prioritizing protein and veggies in your meals. I recommend eating every 4-6 hours to reduce the risk of low blood sugars and try to minimize snacking between meals. Stay well hydrated though the day as well.  Recommend 64oz (2L) water daily as medically appropriate (6-8 glasses)  Recommend pursing regular exercise. 5 minutes of exercise every other day or every 2 days is a great place to start with aiming for 30 minutes 5 times a week as an end goal.  If you can, try to get some strength training twice weekly while  "losing weight to help preserve your muscle!      Follow up:    Call [phone] to schedule next visit in 4 months.    16 minutes spent on the date of the encounter doing chart review, history and exam, result review, counseling, developing plan of care, documentation, and further activities as noted      INTERVAL HISTORY:  Tiffany returns for medical weight management follow up.  Last seen on 4/1/2024 by myself.  Plan at that time was to continue Wegovy titration as well as initiate MiraLAX as needed for constipation.    Does do a lot of fast food due to convenience. Had some better portion control w/last menses. Does a lot of eating at work. Working on reducing carbs and rice. Instead of parboil rice will do frannie and work on smaller portions. Medication has been helpful overall.    Hasn't added Miralax, but willing to try. Doing some more walking but not consistent. Trying to get outdoors.     WEIGHT METRICS:  Body mass index is 50.92 kg/m .   Current Weight: 306 lb (138.8 kg)     Initial Weight (lbs): 305 lbs  Cumulative weight loss (lbs): -1  Weight Loss Percentage: -0.33%    Wt Readings from Last 10 Encounters:   05/24/24 306 lb (138.8 kg)   05/22/24 305 lb 8 oz (138.6 kg)   04/01/24 312 lb 11.2 oz (141.8 kg)   02/07/24 313 lb (142 kg)   01/22/24 313 lb (142 kg)   10/12/23 310 lb (140.6 kg)   06/01/23 308 lb 6.4 oz (139.9 kg)   05/23/23 310 lb (140.6 kg)   05/23/23 310 lb (140.6 kg)   10/28/22 307 lb 6.4 oz (139.4 kg)             LABS:  Reviewed in Epic    12/21/23  ow WNL      BP Readings from Last 6 Encounters:   05/24/24 133/85   05/22/24 (!) 145/87   04/01/24 134/78   02/07/24 136/84   01/22/24 (!) 147/91   06/01/23 129/74       Pulse Readings from Last 6 Encounters:   05/24/24 64   05/22/24 79   04/01/24 69   02/07/24 80   01/22/24 71   06/01/23 81       PE:  /85   Pulse 64   Resp 16   Ht 5' 5\" (1.651 m)   Wt 306 lb (138.8 kg)   SpO2 98%   BMI 50.92 kg/m    GENERAL: Healthy, alert and " no distress  EYES: Eyes grossly normal to inspection.    RESP: No audible wheeze, cough, or visible cyanosis.  No increased work of breathing.    SKIN: Visible skin clear. No significant rash, abnormal pigmentation or lesions.  NEURO: Mentation and speech appropriate for age.  PSYCH: Mentation appears normal, affect normal/bright, judgement and insight intact, normal speech and appearance well-groomed.      Sincerely,      NICO LaraC

## 2024-05-24 ENCOUNTER — OFFICE VISIT (OUTPATIENT)
Dept: SURGERY | Facility: CLINIC | Age: 44
End: 2024-05-24
Payer: COMMERCIAL

## 2024-05-24 VITALS
SYSTOLIC BLOOD PRESSURE: 133 MMHG | WEIGHT: 293 LBS | OXYGEN SATURATION: 98 % | BODY MASS INDEX: 48.82 KG/M2 | HEIGHT: 65 IN | HEART RATE: 64 BPM | RESPIRATION RATE: 16 BRPM | DIASTOLIC BLOOD PRESSURE: 85 MMHG

## 2024-05-24 DIAGNOSIS — R73.03 PREDIABETES: ICD-10-CM

## 2024-05-24 DIAGNOSIS — I10 ESSENTIAL HYPERTENSION: ICD-10-CM

## 2024-05-24 DIAGNOSIS — E66.01 MORBID OBESITY WITH BMI OF 50.0-59.9, ADULT (H): Primary | ICD-10-CM

## 2024-05-24 DIAGNOSIS — E78.5 HYPERLIPIDEMIA, UNSPECIFIED HYPERLIPIDEMIA TYPE: ICD-10-CM

## 2024-05-24 PROCEDURE — 99212 OFFICE O/P EST SF 10 MIN: CPT | Performed by: PHYSICIAN ASSISTANT

## 2024-05-24 RX ORDER — SEMAGLUTIDE 2.4 MG/.75ML
2.4 INJECTION, SOLUTION SUBCUTANEOUS WEEKLY
Qty: 3 ML | Refills: 2 | Status: SHIPPED | OUTPATIENT
Start: 2024-05-24 | End: 2024-07-09 | Stop reason: SINTOL

## 2024-05-24 NOTE — PATIENT INSTRUCTIONS
Nice to talk with you today. Below is the plan discussed.-  Justina Nascimento PA-C     Pt Instructions:  Continue Wegovy and increase to 2.4 mg once Wegovy.   Labs ordered.  Please call [phone] to set up a lab appt.       Goals:  Aim for eating only once or twice a week at a restaurant.  Focus on healthy food choices - prioritizing protein and veggies in your meals. I recommend eating every 4-6 hours to reduce the risk of low blood sugars and try to minimize snacking between meals. Stay well hydrated though the day as well.  Recommend 64oz (2L) water daily as medically appropriate (6-8 glasses)  Recommend pursing regular exercise. 5 minutes of exercise every other day or every 2 days is a great place to start with aiming for 30 minutes 5 times a week as an end goal.  If you can, try to get some strength training twice weekly while losing weight to help preserve your muscle!      Follow up:    Call [phone] to schedule next visit in 4 months.

## 2024-06-09 ENCOUNTER — HEALTH MAINTENANCE LETTER (OUTPATIENT)
Age: 44
End: 2024-06-09

## 2024-07-03 ENCOUNTER — TELEPHONE (OUTPATIENT)
Dept: SURGERY | Facility: CLINIC | Age: 44
End: 2024-07-03
Payer: COMMERCIAL

## 2024-07-03 NOTE — TELEPHONE ENCOUNTER
Received triage call from patient.    She tells me that for the past 2+ weeks, she has been feeling very tired all the time.    She reports she just started her second box of 2.4 mg pens but questions if this box is slightly stronger than her first box.    Of note, her first box was left in the trunk of car for a few hours and became warm. She eventually put it in the fridge.    Informed pt I would reach out to provider to see if a dose decrease is advised.    Encouraged pt to increase her fluids and focus on a few small protein packed meals during the day and not to skip meals.    Tanna WILKERSON RN

## 2024-07-05 NOTE — TELEPHONE ENCOUNTER
Called pt re: Fatigue  No answer. Asked to call clinic, number provided or asked to respond to [facility] message.  Tanna WILKERSON RN

## 2024-07-08 ENCOUNTER — TELEPHONE (OUTPATIENT)
Dept: SURGERY | Facility: CLINIC | Age: 44
End: 2024-07-08
Payer: COMMERCIAL

## 2024-07-08 NOTE — TELEPHONE ENCOUNTER
Patient calling to inform Justina BECK that she will be stopping the Wegovy for now.    She tells me she was in the ED over the weekend due to extreme fatigue and was found to have a low hgb.    She underwent an iron infusion and was prescribed oral iron.     She tells me she is going to hold off on Wegovy until she sees Justina at the end of the month.    Will forward to provider as an FYI.    Tanna WILKERSON RN

## 2024-07-09 ENCOUNTER — OFFICE VISIT (OUTPATIENT)
Dept: FAMILY MEDICINE | Facility: CLINIC | Age: 44
End: 2024-07-09
Payer: COMMERCIAL

## 2024-07-09 VITALS
TEMPERATURE: 97.8 F | HEIGHT: 65 IN | DIASTOLIC BLOOD PRESSURE: 83 MMHG | WEIGHT: 293 LBS | OXYGEN SATURATION: 100 % | BODY MASS INDEX: 48.82 KG/M2 | RESPIRATION RATE: 16 BRPM | HEART RATE: 103 BPM | SYSTOLIC BLOOD PRESSURE: 126 MMHG

## 2024-07-09 DIAGNOSIS — R07.9 CHEST PAIN, UNSPECIFIED TYPE: ICD-10-CM

## 2024-07-09 DIAGNOSIS — D25.9 UTERINE LEIOMYOMA, UNSPECIFIED LOCATION: ICD-10-CM

## 2024-07-09 DIAGNOSIS — I10 ESSENTIAL HYPERTENSION: ICD-10-CM

## 2024-07-09 DIAGNOSIS — Z09 HOSPITAL DISCHARGE FOLLOW-UP: Primary | ICD-10-CM

## 2024-07-09 DIAGNOSIS — D50.0 IRON DEFICIENCY ANEMIA DUE TO CHRONIC BLOOD LOSS: ICD-10-CM

## 2024-07-09 PROCEDURE — 99214 OFFICE O/P EST MOD 30 MIN: CPT | Performed by: PHYSICIAN ASSISTANT

## 2024-07-09 ASSESSMENT — PAIN SCALES - GENERAL: PAINLEVEL: NO PAIN (0)

## 2024-07-09 NOTE — PROGRESS NOTES
"  Assessment & Plan     Hospital discharge follow-up  Reviewed [facility] records, lab results, imaging results    Chest pain, unspecified type  Feeling improved however stress test recommended outpatient, patient still interested in this.  - Exercise Stress Test - Adult; Future    Iron deficiency anemia due to chronic blood loss  Tried taking oral iron since discharge and did not tolerate it well.  Has not tolerated well in the past.  Does not want to continue taking this.  Hesitant of any surgical options to improve the blood loss.  Would be interested in parenteral iron as she tolerated this well in the hospital.  Referral placed to hematology to discuss.  Referral also placed for OB/GYN to discuss other options for her vaginal bleeding and fibroids.  - Ob/Gyn  Referral; Future  - Adult Oncology/Hematology  Referral; Future    Uterine leiomyoma, unspecified location  Will follow-up with OB/GYN to discuss options, has attempted many surgical options but did tolerate an ablation in the past.  - Ob/Gyn  Referral; Future    Essential hypertension  Under control today.  Patient will monitor this at home as it is possible she is having low blood pressures as well with her ongoing weight loss.  Of note was very high in the hospital.    Did recommend she restart a multivitamin with vitamin D.    MED REC REQUIRED  Post Medication Reconciliation Status:  Discharge medications reconciled and changed, see notes/orders  BMI  Estimated body mass index is 49.02 kg/m  as calculated from the following:    Height as of this encounter: 1.651 m (5' 5\").    Weight as of this encounter: 133.6 kg (294 lb 9.6 oz).   Weight management plan: Continue to follow-up with weight management          Subjective   Tiffany is a 44 year old, presenting for the following health issues:  Hospital F/U        7/9/2024    10:54 AM   Additional Questions   Roomed by Gayle   Accompanied by self     HPI       ED/UC " "Followup:    Facility:  [facility]  Date of visit: 07/05/2024    Reason for visit: Chest Pain  Current Status: Feeling better.Injection dosage too high    Having symptoms for about 2 months, questions if higher Wegovy dose triggered this as it was around the time that she increased.  Presented to the ED 7/5/2024 for fatigue, chest pain, exertional fatigue, admitted overnight.  Troponins were high but stable, no EKG changes, echo unremarkable.  They recommended outpatient stress test.  Recommended she stop her Wegovy as her fatigue may have been related.  She also had worsened hemoglobin and given a dose of parenteral iron, patient did not find this very helpful.  Discharged on oral iron.  She did try taking this 2 days ago and felt dizzy, vision changes, upset stomach, weak, did not take it yesterday or today and has felt improved again.        Objective    /83 (BP Location: Left arm, Patient Position: Sitting, Cuff Size: Adult Large)   Pulse 103   Temp 97.8  F (36.6  C) (Temporal)   Resp 16   Ht 1.651 m (5' 5\")   Wt 133.6 kg (294 lb 9.6 oz)   LMP 06/10/2024 (Approximate)   SpO2 100%   BMI 49.02 kg/m    Body mass index is 49.02 kg/m .  Physical Exam   GENERAL: alert and no distress  EYES: Eyes grossly normal to inspection, PERRL and conjunctivae and sclerae normal  HENT: normal cephalic/atraumatic, oropharynx clear, and oral mucous membranes moist  RESP: lungs clear to auscultation - no rales, rhonchi or wheezes  CV: regular rate and rhythm, normal S1 S2, no S3 or S4, no murmur, click or rub, no peripheral edema             Signed Electronically by: Poornima Molina PA-C    "

## 2024-07-12 ENCOUNTER — PATIENT OUTREACH (OUTPATIENT)
Dept: ONCOLOGY | Facility: CLINIC | Age: 44
End: 2024-07-12
Payer: COMMERCIAL

## 2024-07-13 NOTE — TELEPHONE ENCOUNTER
New Patient Hematology Nurse Navigator Note     Referral Date: 07.9.24    Referring provider: Poornima Molina PA-C     Referring Clinic/Organization: [facility]     Referred to:  hematology     Requested provider (if applicable): First available - did not specify     Evaluation for :     iron deficiency anemia, doesn't tolerate oral iron. Ref to OBGYN too for DUB        Clinical History (per Nurse review of records provided):    Iron deficiency anemia due to chronic blood loss  Tried taking oral iron since discharge and did not tolerate it well.  Has not tolerated well in the past.  Does not want to continue taking this.  Hesitant of any surgical options to improve the blood loss.  Would be interested in parenteral iron as she tolerated this well in the hospital.  Referral placed to hematology to discuss.  Referral also placed for OB/GYN to discuss other options for her vaginal bleeding and fibroids.    Received Venoferx3  in [facility] in  2019   given Venofer 300mg in the ED at [facility] on 7/5/2024   Component  Ref Range & Units 7/6/2024   TIBC  250 - 450 ug/dL 381   IRON SATURATION  15 - 50 % 3 Low    FERRITIN, SERUM  8.0 - 388.0 ng/mL 8.0   Iron  50 - 170 ug/dL 10 Low    TRANSFERRIN, SERUM  202 - 364 mg/dL 300     Ref Range & Units 7/5/2024   WBC  4.3 - 10.8 K/uL 6.9   RBC  4.20 - 5.40 M/uL 4.14 Low    Hemoglobin  12.0 - 16.0 gm/dL 8.5 Low    Hematocrit  36.0 - 48.0 % 28.5 Low    MCV  80 - 100 fL 69 Low    MCH  27 - 33 pg 21 Low    MCHC  33 - 36 gm/dL 30 Low    RDW  11.5 - 14.5 % 18.5 High    Platelet Count  150 - 400 K/   MPV    Comment: Unable to calculate MPV due to abnormal platelet distribution.   PMN %  % 56.2   IG %  <=1.0 % 0.3   Lymphocyte %  % 32.9   Monocyte %  % 7.7   Eosinophil %  % 2.5   Basophil %  % 0.4   PMN Absolute  1.80 - 7.80 K/uL 3.85   Lymphocyte Absolute  1.00 - 4.00 K/uL 2.26   Monocyte Absolute  0.00 - 1.00 K/uL 0.53   Eosinophil  Absolute  0.00 - 0.45 K/uL 0.17   Basophil Absolute  0.00 - 0.20 K/uL 0.03   Nucl RBC %  0.0 - 0.0 /100 WBC 0.0     8 yr ago 08/16/2015   Hgb A2 Quant  2.2 - 3.5 % 3.0   Hgb F Quant  0.0 - 2.0 % <0.8   Hemoglobin ELP Normal hemoglobin electrophoresis pattern.    Interpreted by Brayden Novoa M.D.       Referral updates and Plan:   Proceed to scheduling     Evakomal Andradeland  Hematology Nurse Navigation   [phone]    [facility] / Hematology   [phone]   [email or web address]

## 2024-07-26 DIAGNOSIS — I10 ESSENTIAL HYPERTENSION: ICD-10-CM

## 2024-07-26 DIAGNOSIS — R73.03 PREDIABETES: ICD-10-CM

## 2024-07-26 DIAGNOSIS — E66.01 MORBID OBESITY WITH BMI OF 50.0-59.9, ADULT (H): ICD-10-CM

## 2024-07-26 DIAGNOSIS — E78.5 HYPERLIPIDEMIA, UNSPECIFIED HYPERLIPIDEMIA TYPE: ICD-10-CM

## 2024-07-26 RX ORDER — SEMAGLUTIDE 1.7 MG/.75ML
1.7 INJECTION, SOLUTION SUBCUTANEOUS WEEKLY
Qty: 3 ML | Refills: 2 | Status: SHIPPED | OUTPATIENT
Start: 2024-07-26

## 2024-08-13 ENCOUNTER — TELEPHONE (OUTPATIENT)
Dept: SURGERY | Facility: CLINIC | Age: 44
End: 2024-08-13
Payer: COMMERCIAL

## 2024-08-13 NOTE — TELEPHONE ENCOUNTER
Prior Authorization Retail Medication Request    Medication/Dose: Semaglutide-Weight Management (WEGOVY) 1.7 MG/0.75ML pen  Diagnosis and ICD code (if different than what is on RX):  [E66.01, Z68.43]  [R73.03] [E78.5] [I10]   New/renewal/insurance change PA/secondary ins. PA:  Previously Tried and Failed:  Diets, exercise, life style changes.   Rationale:  AOM Considerations:  Phentermine:   Tried and hair loss  Topiramate:       GLP-1:        Denies Medullary Thyroid Ca (incl Fhx), MEN type II, denies pt was pancreatitis Hx, gallbladder problems or gastroparesis.      Naltrexone:        Wellbutrin:         Metformin:         Contrave:  Qsymia:    Insurance   Primary: ARE [11] - ARE [facility] [[id number]]   Insurance ID:    [id number]     Secondary (if applicable):PA  Insurance ID:  If applicable    Pharmacy Information (if different than what is on RX)  Name:  [facility]  Phone:  [phone]  Fax:[phone]

## 2024-08-26 NOTE — TELEPHONE ENCOUNTER
RECORDS STATUS - ALL OTHER DIAGNOSIS      RECORDS RECEIVED FROM: [facility]   NOTES STATUS DETAILS   OFFICE NOTE from referring provider Epic 7/9/2024 - Poornima Molina PA-C   DISCHARGE SUMMARY from [facility] 2/27/2016 - [facility]   DISCHARGE REPORT from the ER [facility]  7/5/2024 - [facility]   MEDICATION LIST [facility]    LABS     PATHOLOGY REPORTS     ANYTHING RELATED TO DIAGNOSIS [facility] 7/6/2024

## 2024-09-22 DIAGNOSIS — I10 ESSENTIAL HYPERTENSION: ICD-10-CM

## 2024-09-30 NOTE — TELEPHONE ENCOUNTER
martinem for pt. Gave her the number to reschedule her appt. Cancelled her today as she has not completed her new pt questionnaire which is a requirement for the appt today    Lindsay Sparrow MA    
Statement Selected

## 2024-10-03 RX ORDER — HYDROCHLOROTHIAZIDE 25 MG/1
TABLET ORAL
Qty: 90 TABLET | Refills: 1 | Status: SHIPPED | OUTPATIENT
Start: 2024-10-03

## 2024-10-08 ENCOUNTER — PRE VISIT (OUTPATIENT)
Dept: ONCOLOGY | Facility: CLINIC | Age: 44
End: 2024-10-08
Payer: COMMERCIAL

## 2024-10-08 ENCOUNTER — ANCILLARY PROCEDURE (OUTPATIENT)
Dept: MAMMOGRAPHY | Facility: CLINIC | Age: 44
End: 2024-10-08
Attending: OBSTETRICS & GYNECOLOGY
Payer: COMMERCIAL

## 2024-10-08 DIAGNOSIS — Z12.31 ENCOUNTER FOR SCREENING MAMMOGRAM FOR BREAST CANCER: ICD-10-CM

## 2024-10-08 PROCEDURE — 77067 SCR MAMMO BI INCL CAD: CPT | Performed by: STUDENT IN AN ORGANIZED HEALTH CARE EDUCATION/TRAINING PROGRAM

## 2024-10-08 PROCEDURE — 77063 BREAST TOMOSYNTHESIS BI: CPT | Performed by: STUDENT IN AN ORGANIZED HEALTH CARE EDUCATION/TRAINING PROGRAM

## 2024-10-24 ENCOUNTER — LAB (OUTPATIENT)
Dept: LAB | Facility: CLINIC | Age: 44
End: 2024-10-24
Payer: MEDICAID

## 2024-10-24 DIAGNOSIS — Z01.419 ENCOUNTER FOR GYNECOLOGICAL EXAMINATION WITHOUT ABNORMAL FINDING: ICD-10-CM

## 2024-10-24 DIAGNOSIS — E66.01 MORBID OBESITY WITH BMI OF 50.0-59.9, ADULT (H): ICD-10-CM

## 2024-10-24 LAB
ANION GAP SERPL CALCULATED.3IONS-SCNC: 10 MMOL/L (ref 7–15)
BUN SERPL-MCNC: 9.5 MG/DL (ref 6–20)
CALCIUM SERPL-MCNC: 9.7 MG/DL (ref 8.8–10.4)
CHLORIDE SERPL-SCNC: 103 MMOL/L (ref 98–107)
CHOLEST SERPL-MCNC: 233 MG/DL
CREAT SERPL-MCNC: 0.81 MG/DL (ref 0.51–0.95)
EGFRCR SERPLBLD CKD-EPI 2021: >90 ML/MIN/1.73M2
EST. AVERAGE GLUCOSE BLD GHB EST-MCNC: 117 MG/DL
FASTING STATUS PATIENT QL REPORTED: YES
FASTING STATUS PATIENT QL REPORTED: YES
GLUCOSE SERPL-MCNC: 99 MG/DL (ref 70–99)
HBA1C MFR BLD: 5.7 % (ref 0–5.6)
HCO3 SERPL-SCNC: 26 MMOL/L (ref 22–29)
HDLC SERPL-MCNC: 46 MG/DL
LDLC SERPL CALC-MCNC: 169 MG/DL
NONHDLC SERPL-MCNC: 187 MG/DL
POTASSIUM SERPL-SCNC: 4.2 MMOL/L (ref 3.4–5.3)
SODIUM SERPL-SCNC: 139 MMOL/L (ref 135–145)
TRIGL SERPL-MCNC: 88 MG/DL

## 2024-10-24 PROCEDURE — 36415 COLL VENOUS BLD VENIPUNCTURE: CPT

## 2024-10-24 PROCEDURE — 80061 LIPID PANEL: CPT

## 2024-10-24 PROCEDURE — 80048 BASIC METABOLIC PNL TOTAL CA: CPT

## 2024-10-24 PROCEDURE — 83036 HEMOGLOBIN GLYCOSYLATED A1C: CPT

## 2024-11-04 ENCOUNTER — PATIENT OUTREACH (OUTPATIENT)
Dept: GASTROENTEROLOGY | Facility: CLINIC | Age: 44
End: 2024-11-04
Payer: COMMERCIAL

## 2024-11-04 DIAGNOSIS — Z12.11 SPECIAL SCREENING FOR MALIGNANT NEOPLASMS, COLON: Primary | ICD-10-CM

## 2024-11-04 NOTE — PROGRESS NOTES
She has known GYN blood loss, at this time ok for screening, has upcoming PCP visit 11/20 and can reassess then if needed.

## 2024-11-04 NOTE — PROGRESS NOTES
Patient requested colonoscopy screening but also has significant blood loss/anemia. Will send to PCP to order if diagnostic colonoscopy is preferred or EGD add-on would be warranted at this time.

## 2024-11-06 NOTE — PROGRESS NOTES
"Reviewed ordering under upcoming PCP visit with Dr. Berrios. Ok to order screening under her.    CRC Screening Colonoscopy Referral Review    Patient meets the inclusion criteria for screening colonoscopy standing order.    Ordering/Referring Provider:  Dr. BLANE Berrios    BMI: Estimated body mass index is 49.02 kg/m  as calculated from the following:    Height as of 7/9/24: 1.651 m (5' 5\").    Weight as of 7/9/24: 133.6 kg (294 lb 9.6 oz).     Sedation:  Does patient have any of the following conditions affecting sedation?  No medical conditions affecting sedation.    Previous Scopes:  Any previous recommendations or follow up needs based on previous scope?  Location recommendations: hospital only due to low Hgb    Medical Concerns to Postpone Order:  Does patient have any of the following medical concerns that should postpone/delay colonoscopy referral?  No medical conditions affecting colonoscopy referral.    Final Referral Details:  Based on patient's medical history patient is appropriate for referral order with moderate sedation. If patient's BMI > 50 do not schedule in ASC.  "

## 2024-11-08 ENCOUNTER — OFFICE VISIT (OUTPATIENT)
Dept: FAMILY MEDICINE | Facility: CLINIC | Age: 44
End: 2024-11-08
Payer: COMMERCIAL

## 2024-11-08 ENCOUNTER — TELEPHONE (OUTPATIENT)
Dept: SURGERY | Facility: CLINIC | Age: 44
End: 2024-11-08

## 2024-11-08 VITALS
SYSTOLIC BLOOD PRESSURE: 147 MMHG | HEART RATE: 81 BPM | OXYGEN SATURATION: 100 % | RESPIRATION RATE: 16 BRPM | WEIGHT: 293 LBS | BODY MASS INDEX: 48.82 KG/M2 | TEMPERATURE: 98.1 F | DIASTOLIC BLOOD PRESSURE: 86 MMHG | HEIGHT: 65 IN

## 2024-11-08 DIAGNOSIS — B35.0 TINEA CAPITIS: ICD-10-CM

## 2024-11-08 DIAGNOSIS — R73.03 PREDIABETES: Primary | ICD-10-CM

## 2024-11-08 DIAGNOSIS — R06.83 SNORING: ICD-10-CM

## 2024-11-08 DIAGNOSIS — D50.0 IRON DEFICIENCY ANEMIA DUE TO CHRONIC BLOOD LOSS: ICD-10-CM

## 2024-11-08 DIAGNOSIS — E66.01 MORBID OBESITY WITH BMI OF 50.0-59.9, ADULT (H): ICD-10-CM

## 2024-11-08 DIAGNOSIS — L65.9 HAIR LOSS: ICD-10-CM

## 2024-11-08 DIAGNOSIS — I10 ESSENTIAL HYPERTENSION: ICD-10-CM

## 2024-11-08 PROCEDURE — 36415 COLL VENOUS BLD VENIPUNCTURE: CPT | Performed by: PREVENTIVE MEDICINE

## 2024-11-08 PROCEDURE — G2211 COMPLEX E/M VISIT ADD ON: HCPCS | Performed by: PREVENTIVE MEDICINE

## 2024-11-08 PROCEDURE — 99214 OFFICE O/P EST MOD 30 MIN: CPT | Performed by: PREVENTIVE MEDICINE

## 2024-11-08 PROCEDURE — 80076 HEPATIC FUNCTION PANEL: CPT | Performed by: PREVENTIVE MEDICINE

## 2024-11-08 RX ORDER — TERBINAFINE HYDROCHLORIDE 250 MG/1
250 TABLET ORAL DAILY
Qty: 30 TABLET | Refills: 0 | Status: SHIPPED | OUTPATIENT
Start: 2024-11-08

## 2024-11-08 RX ORDER — FERROUS GLUCONATE 324(38)MG
324 TABLET ORAL
Qty: 90 TABLET | Refills: 1 | Status: SHIPPED | OUTPATIENT
Start: 2024-11-08

## 2024-11-08 RX ORDER — LABETALOL 300 MG/1
300 TABLET, FILM COATED ORAL 2 TIMES DAILY
Qty: 180 TABLET | Refills: 1 | Status: SHIPPED | OUTPATIENT
Start: 2024-11-08

## 2024-11-08 ASSESSMENT — PAIN SCALES - GENERAL: PAINLEVEL_OUTOF10: NO PAIN (0)

## 2024-11-08 NOTE — PROGRESS NOTES
Assessment & Plan     Prediabetes  -working on lifestyle changes  -had been on Wegovy but stopped due to side effects   - REVIEW OF HEALTH MAINTENANCE PROTOCOL ORDERS    Lab Results   Component Value Date    A1C 5.7 10/24/2024    A1C 6.0 06/01/2023    A1C 5.5 06/11/2021    A1C 5.3 02/21/2020    A1C 5.6 07/02/2019    A1C 5.3 06/04/2019    A1C 5.4 02/26/2019         Essential hypertension  -not taking medication daily  -refills provided   - labetalol (NORMODYNE) 300 MG tablet  Dispense: 180 tablet; Refill: 1    Morbid obesity with BMI of 50.0-59.9, adult (H)  -followed by the weight clinic  -has follow up scheduled    Wt Readings from Last 2 Encounters:   11/08/24 137.8 kg (303 lb 12.8 oz)   07/09/24 133.6 kg (294 lb 9.6 oz)        Iron deficiency anemia due to chronic blood loss  -was advised to have iron infusions   - ferrous gluconate (FERGON) 324 (38 Fe) MG tablet  Dispense: 90 tablet; Refill: 1    Tinea capitis  - terbinafine (LAMISIL) 250 MG tablet  Dispense: 30 tablet; Refill: 0  - Hepatic panel (Albumin, ALT, AST, Bili, Alk Phos, TP)  - Hepatic panel (Albumin, ALT, AST, Bili, Alk Phos, TP)    Hair loss  -ongoing hair issues since adolescence   - Adult Dermatology  Referral    Snoring  -missed appointment last time due to work schedule   - Adult Sleep Eval & Management  Referral          Marques Allen is a 44 year old, presenting for the following health issues:  Hair/Scalp Problem (Hair loss) and Referral (Sleep apnea)        11/8/2024    12:21 PM   Additional Questions   Roomed by Gayle   Accompanied by self     History of Present Illness       Reason for visit:  Follow up and to talk about hairloss    She eats 2-3 servings of fruits and vegetables daily.She consumes 1 sweetened beverage(s) daily.She exercises with enough effort to increase her heart rate 9 or less minutes per day.  She exercises with enough effort to increase her heart rate 3 or less days per week. She is missing 2  "dose(s) of medications per week.     Had to stop Wegovy due to side effects  Has had chest pain and seen in ED for this, has stress test scheduled       Referral to go back and do a sleep apnea test  Snoring+  Missed last appointment due to work.      Blood pressure at home not checked  Has not taken blood pressure medication this morning.  Has cut back on pop and coffee  Will be seeing Weight clinic 12/24.    The 10-year ASCVD risk score (Brijesh ROTHMAN, et al., 2019) is: 2.5%    Values used to calculate the score:      Age: 44 years      Sex: Female      Is Non- : No      Diabetic: No      Tobacco smoker: No      Systolic Blood Pressure: 147 mmHg      Is BP treated: Yes      HDL Cholesterol: 46 mg/dL      Total Cholesterol: 233 mg/dL      Hair loss concerns+  -has anemia+  -hair loss since adolescence     Did not get iron infusions          Objective    BP (!) 147/86 (BP Location: Right arm, Patient Position: Sitting, Cuff Size: Adult Large)   Pulse 81   Temp 98.1  F (36.7  C) (Temporal)   Resp 16   Ht 1.651 m (5' 5\")   Wt 137.8 kg (303 lb 12.8 oz)   LMP 10/09/2024 (Approximate)   SpO2 100%   BMI 50.55 kg/m    Body mass index is 50.55 kg/m .  Physical Exam   GENERAL APPEARANCE: healthy, alert and no distress  EYES: Eyes grossly normal to inspection and conjunctivae and sclerae normal  RESP: lungs clear to auscultation - no rales, rhonchi or wheezes  CV: regular rates and rhythm, normal S1 S2, no S3 or S4 and no murmur, click or rub  SKIN: no suspicious lesions or rashes  NEURO: Normal strength and tone, mentation intact and speech normal  PSYCH: mentation appears normal  Scalp: wig removed to examine scalp. Patches of alopecia, scaling++      No results found for this or any previous visit (from the past 24 hours).        Signed Electronically by: Estefania Berrios MD MPH      "

## 2024-11-08 NOTE — PATIENT INSTRUCTIONS
At [facility], we strive to deliver an exceptional experience to you, every time we see you. If you receive a survey, please let us know what we are doing well and/or what we could improve upon, as we do value your feedback.  If you have MyChart, you can expect to receive results automatically within 24 hours of their completion.  Your provider will send a note interpreting your results as well.   If you do not have MyChart, you should receive your results in about a week by mail.    Your care team:                            Family Medicine Internal Medicine   MD Yvan Sloan, MD Faiza Waddell, MD Nestor Loya, MD Becka Silverman, PAOlvinC    Solomon Cardona, MD Pediatrics   Estefania Berrios, MD Symone Arellano, MD Humera Taylor, APRN CNP Laura Lang APRN CNP   MD Mey Thomson, MD Grisel Wilson, CNP     [place], CNP Same-Day Provider (No follow-up visits)   MAYANK Tam, DNP Courtney Santos, MAYANK Noble, FNP, BC NICO McgillC     Clinic hours: Monday - Thursday 7 am-6 pm; Fridays 7 am-5 pm.   Urgent care: Monday - Friday 10 am- 8 pm; Saturday and Sunday 9 am-5 pm.    Clinic: [phone]       [facility]: Monday - Thursday 8 am - 7 pm; Friday 8 am - 6 pm  [facility]: [phone]

## 2024-11-08 NOTE — TELEPHONE ENCOUNTER
General Call    Contacts       Contact Date/Time Type Contact Phone/Fax    11/08/2024 01:20 PM CST Phone (Incoming) Tiffany Hernandez (Self) [phone] (M)          Reason for Call:  call back    What are your questions or concerns:  pt would like a call back regarding her lab results and going back on wegovy    Could we send this information to you in [facility] or would you prefer to receive a phone call?:   Patient would prefer a phone call   Okay to leave a detailed message?: Yes at Cell number on file:    Telephone Information:   Mobile [phone]

## 2024-11-08 NOTE — TELEPHONE ENCOUNTER
Seeing Justina Nascimento PA-C on 12/18.  Reviewed labs - WNL.    Last seen in May and multiple no-shows since that time.    Needs appointment to discuss medication management.    Tanna WILKERSON RN, BSN

## 2024-11-10 NOTE — RESULT ENCOUNTER NOTE
Tiffany,     Liver function tests are within normal limits.     Please do not hesitate to call us at [phone] if you have any questions or concerns.    Thank you,    Estefania Berrios MD MPH

## 2024-11-12 LAB
ALBUMIN SERPL BCG-MCNC: 4.3 G/DL (ref 3.5–5.2)
ALP SERPL-CCNC: 104 U/L (ref 40–150)
ALT SERPL W P-5'-P-CCNC: 25 U/L (ref 0–50)
AST SERPL W P-5'-P-CCNC: 24 U/L (ref 0–45)
BILIRUB DIRECT SERPL-MCNC: <0.2 MG/DL (ref 0–0.3)
BILIRUB SERPL-MCNC: <0.2 MG/DL
PROT SERPL-MCNC: 7.8 G/DL (ref 6.4–8.3)

## 2024-11-27 NOTE — TELEPHONE ENCOUNTER
I called patient and scheduled her to see Delfina Salazar at the [facility] tomorrow.  Patient pleased.  Sasha Silvestre, JADYN  December 23, 2019 1:24 PM       <-- Click to add NO significant Past Surgical History

## 2024-12-13 NOTE — PROGRESS NOTES
"Tiffany is a 44 year old who is being evaluated via a billable video visit.      The patient has been notified of following:     \"This video visit will be conducted via a call between you and your physician/provider. We have found that certain health care needs can be provided without the need for an in-person physical exam.  This service lets us provide the care you need with a video conversation.  If a prescription is necessary we can send it directly to your pharmacy.  If lab work is needed we can place an order for that and you can then stop by our lab to have the test done at a later time.    Video visits are billed at different rates depending on your insurance coverage.  Please reach out to your insurance provider with any questions.    If during the course of the call the physician/provider feels a video visit is not appropriate, you will not be charged for this service.\"    Patient has given verbal consent for Video visit? Yes    How would you like to obtain your AVS? MyChart    If the video visit is dropped, the invitation should be resent by: Text to cell phone: [phone]    Will anyone else be joining your video visit? No    I    Video-Visit Details    Type of service:  Video Visit    Originating Location (pt. Location): Work in [place]     Distant Location (provider location):   [facility]    Platform used for Video Visit: [employer]    Video Start Time: 11:08 AM    Video End Time:11:24 AM    Text to join 10:57 am, resent text at 11 am, called on [employer] at 11:05 am No answer - left  w/scheduling line. Called at desk phone 11:05      2024      Return Medical Weight Management Note     Tiffany Guevara  MRN:  [id number]  :  1980    Assessment & Plan   Problem List Items Addressed This Visit       Morbid obesity with BMI of 50.0-59.9, adult (H) - Primary     Plan to restart medical management. Will try for Zepbound to see if covered - pt would like to " stay at lowest effective dose. Reviewed risks/side effects including hair loss which she reported w/Wegovy. She reports has seen PCP and told iron deficiency contributing. Recommend she follow-up w/them for that and I also discussed increased risk for that hair loss to recur with Zepbound and weight loss. She reports understanding and still wants to start. Appreciate MTM med follow-up in 2-3 months given inconsistent with scheduling and follow-up the past year         Relevant Medications    tirzepatide-Weight Management (ZEPBOUND) 2.5 MG/0.5ML prefilled pen    tirzepatide-Weight Management (ZEPBOUND) 5 MG/0.5ML prefilled pen    Other Relevant Orders    Med Therapy Management Referral        AOM Considerations:  Phentermine:   Tried and hair loss  Topiramate:       GLP-1:        Denies Medullary Thyroid Ca (incl Fhx), MEN type II, denies pt was pancreatitis Hx, gallbladder problems or gastroparesis.      Naltrexone:        Wellbutrin:         Metformin:         Contrave:  Qsymia:    PATIENT INSTRUCTIONS:  Pt Instructions:  Follow-up with primary care for your low iron, your hair loss, and talking to primary care about stress test/etc as well in terms of exercises you could do that's safe for you.  Start Zepbound:  You'll start at 2.5 mg once weekly for 4 weeks.   If you're tolerating it well, increase to 5 mg once weekly thereafter until I see you again.    You can take over the counter famotidine (Pepcid) 20 mg once or twice daily as needed for nausea/heartburn.    4. Referral placed for Medication Therapy Management Pharmacist. They will call you to schedule.      Goals:  I recommend eating breakfast within an hour of waking up and eating every 4-6 hours during the day and try minimize snacking between meals. Prioritizing veggies and proteins for food choices is also recommended as medically appropriate.  Recommend pursing regular exercise. 5 minutes of exercise every other day or every 2 days is a great place to  start with aiming for 30 minutes 5 times a week as an end goal.  If you can, try to get some strength training twice weekly while losing weight to help preserve your muscle!      Follow up:    Call [phone] to schedule next visit in next available. Be in touch on [employer] for refills/concerns between visits    27 minutes spent on the date of the encounter doing chart review, history and exam, result review, counseling, developing plan of care, documentation, and further activities as noted      INTERVAL HISTORY:  Tiffany returns for medical weight management follow up.  Last seen on 5/24/2024 with myself.  At that time she was going to continue with Wegovy.    Phone call from 11/8/2024 interested in going back to Wegovy.  With multiple no-shows needed to be seen for an appointment prior to starting.    Bel Vinot messaging from August that she hasn't been able to  Wegovy due to insurance concerns/issues and wanting to talk about surgery again.     Pt reports that her insurance is back up again.    Reviewed Zepbound also as an option - Discussed mechanism of action, common side effects, titration guidelines, and  discussed risks for pancreatitis/gallbladder problems/gastroparesis/low sugars/MTC/MEN2. Medication handout given in AVS. Pt reports had hair loss with Wegovy and reports this is influenced by her iron as well- discussed can be a risk with Zepbound as well. Pt voiced understanding and states still wants to start Zepbound.    WEIGHT METRICS:  Body mass index is 52.75 kg/m .   Current Weight: 317 lb (143.8 kg) (patient  reported)  Last Visits Weight: 306 lb (138.8 kg)  Initial Weight (lbs): 305 lbs  Cumulative weight loss (lbs): -12  Weight Loss Percentage: -3.93%    Wt Readings from Last 10 Encounters:   12/18/24 317 lb (143.8 kg)   11/08/24 303 lb 12.8 oz (137.8 kg)   07/09/24 294 lb 9.6 oz (133.6 kg)   05/24/24 306 lb (138.8 kg)   05/22/24 305 lb 8 oz (138.6 kg)   04/01/24 312 lb 11.2 oz (141.8 kg)  "  02/07/24 313 lb (142 kg)   01/22/24 313 lb (142 kg)   10/12/23 310 lb (140.6 kg)   06/01/23 308 lb 6.4 oz (139.9 kg)                 LABS:  Labs reviewed in Epic    10/24/2024 BMP normal    BP Readings from Last 6 Encounters:   11/08/24 (!) 147/86   07/09/24 126/83   05/24/24 133/85   05/22/24 (!) 145/87   04/01/24 134/78   02/07/24 136/84       Pulse Readings from Last 6 Encounters:   11/08/24 81   07/09/24 103   05/24/24 64   05/22/24 79   04/01/24 69   02/07/24 80       PE:  Ht 5' 5\" (1.651 m)   Wt 317 lb (143.8 kg)   LMP 10/09/2024 (Approximate)   BMI 52.75 kg/m    GENERAL: Healthy, alert and no distress  EYES: Eyes grossly normal to inspection.    RESP: No audible wheeze, cough, or visible cyanosis.  No increased work of breathing.    SKIN: Visible skin clear. No significant rash, abnormal pigmentation or lesions.  NEURO: Mentation and speech appropriate for age.  PSYCH: Mentation appears normal, affect normal/bright, judgement and insight intact, normal speech and appearance well-groomed.      Sincerely,      Justina Nascimento PA-C         "

## 2024-12-18 ENCOUNTER — VIRTUAL VISIT (OUTPATIENT)
Dept: SURGERY | Facility: CLINIC | Age: 44
End: 2024-12-18
Payer: COMMERCIAL

## 2024-12-18 ENCOUNTER — TELEPHONE (OUTPATIENT)
Dept: SURGERY | Facility: CLINIC | Age: 44
End: 2024-12-18

## 2024-12-18 VITALS — BODY MASS INDEX: 48.82 KG/M2 | HEIGHT: 65 IN | WEIGHT: 293 LBS

## 2024-12-18 DIAGNOSIS — E66.01 MORBID OBESITY WITH BMI OF 50.0-59.9, ADULT (H): Primary | ICD-10-CM

## 2024-12-18 PROCEDURE — 99213 OFFICE O/P EST LOW 20 MIN: CPT | Mod: 95 | Performed by: PHYSICIAN ASSISTANT

## 2024-12-18 PROCEDURE — G2211 COMPLEX E/M VISIT ADD ON: HCPCS | Mod: 95 | Performed by: PHYSICIAN ASSISTANT

## 2024-12-18 NOTE — ASSESSMENT & PLAN NOTE
Plan to restart medical management. Will try for Zepbound to see if covered - pt would like to stay at lowest effective dose. Reviewed risks/side effects including hair loss which she reported w/Wegovy. She reports has seen PCP and told iron deficiency contributing. Recommend she follow-up w/them for that and I also discussed increased risk for that hair loss to recur with Zepbound and weight loss. She reports understanding and still wants to start. Appreciate MTM med follow-up in 2-3 months given inconsistent with scheduling and follow-up the past year

## 2024-12-18 NOTE — TELEPHONE ENCOUNTER
"Prior Authorization Retail Medication Request    Medication/Dose: Zepbound 2.5 and 5 mg    ICD code (if different than what is on RX):  [E66.01, Z68.43]     Previously Tried and Failed:  Phentermine:   Tried and hair loss     Rationale:  diet and lifestyle  Wegovy = fatigue  Phentermine = hair loss    Hx of obesity   Ht: 5'5\"    Body mass index is 52.75 kg/m .  12/18/24  Current Weight:  317 lb (143.8 kg) 12/18/24          Insurance Name:      [facility]     Insurance ID:  [id number]       Pharmacy Information (if different than what is on RX)  [facility]  [id number] [address] [facility] [address]   Phone:  [phone]     "

## 2024-12-18 NOTE — PATIENT INSTRUCTIONS
Nice to talk with you today. Below is the plan discussed.-  Justina Nascimento PA-C     Pt Instructions:  Follow-up with primary care for your low iron, your hair loss, and talking to primary care about stress test/etc as well in terms of exercises you could do that's safe for you.  Start Zepbound:  You'll start at 2.5 mg once weekly for 4 weeks.   If you're tolerating it well, increase to 5 mg once weekly thereafter until I see you again.    You can take over the counter famotidine (Pepcid) 20 mg once or twice daily as needed for nausea/heartburn.    4. Referral placed for Medication Therapy Management Pharmacist. They will call you to schedule.      Goals:  I recommend eating breakfast within an hour of waking up and eating every 4-6 hours during the day and try minimize snacking between meals. Prioritizing veggies and proteins for food choices is also recommended as medically appropriate.  Recommend pursing regular exercise. 5 minutes of exercise every other day or every 2 days is a great place to start with aiming for 30 minutes 5 times a week as an end goal.  If you can, try to get some strength training twice weekly while losing weight to help preserve your muscle!      Follow up:    Call [phone] to schedule next visit in next available. Be in touch on [employer] for refills/concerns between visits    Zepbound (Tirzepatide)    Zepbound (Tirzepatide): is an injectable prescription medicine recently FDA approved for adults with obesity or overweight with an additional condition affected by their weight.      It is given as a shot once a week. It activates the body's receptors for GIP (glucose-dependent insulinotropic polypeptide) and GLP-1 (glucagon-like peptide-1) receptor, two naturally occurring hormones that help tell the brain that you are full. It also works is by slowing down how quickly food leaves your stomach.     You will start to feel fish more quickly, notice portion changes and eat less often  between meals.  Patients are advised to eat slowly and purposefully. Give yourself less portions. You may find after starting this medication you have a new point of fullness. Maintain consistent eating schedule with meals +/- snacks and get in good hydration.    Dosing:  Initial dose: 2.5 mg injected subcutaneously once weekly for 4 weeks  Further dose changes can include: increase to 5 mg once weekly for 4 weeks, then 7.5 mg once weekly for 4 weeks, then 10 mg once weekly for 4 weeks then 12.5 mg once weekly for 4 weeks, then 15 mg (maximum dose) once weekly.    Dose changes are based on how you are tolerating the current dose, what benefits you are seeing at the current dose and if improvement in effectiveness of the drug is needed. The goal is to find the dose that works best for you with the least amount of side effects. You may find you reach this at a lower dose than the maximum dose.     Side effects: Common side effects include nausea, Heartburn,diarrhea, constipation. This is worse when starting the medication and with dose dose escalation.  It does improve with time. Stay adequately hydrated and eat small portions to decrease the risk of side effects.     The risk of pancreatitis (inflammation of the pancreas) has been associated with this type of medication, but is very rare.  If you have had pancreatitis in the past, this medication may not be for you. If you experience persistent severe abdominal pain (sometimes radiating to the back potentially accompanied by vomiting and have a fever), stop the medication and contact your provider immediately for assessment. They will do a blood test to check for pancreatitis.       Caution:   Tirzepatide (Zepbound, Mounjaro) May decrease effectiveness of your oral birth control while starting and with dose adjustments.  Use backup forms of birth control if necessary.      For any questions or concerns please send a [employer] message to our team or call our weight  management call center at [phone] during regular business hours.     There is a small chance you may have some low blood sugar after taking the medication.   The signs of low blood sugar are:  Weakness  Shaky   Hungry  Sweating  Confusion      See below for ways to treat low blood sugar without adding in lots of extra calories.      Treating Low Blood Sugar    If you have symptoms of low blood sugar (sweating, shaking, dizzy, confused) eat 15 grams of carbs and wait 15 minutes:    Glucose Tabs are best for sugars under 70 -  Dex4 or BD Glucose tablets are good, you will need to take 3-4 of these to equal 15 grams.     One small box of raisins  4 oz fruit juice box or   cup fruit juice  1 small apple  1 small banana    cup canned fruit in water    English muffin or a slice of bread with jelly   1 low fat frozen waffle with sugar-free syrup    cup cottage cheese with   cup frozen or fresh blueberries  1 cup skim or low-fat milk    cup whole grain cereal  4-6 crackers such as Triscuits      This medication is usually not covered by insurance and can be quite expensive. Sometimes a prior authorization is required, which may take up to 1-2 weeks for an insurance company to make a decision if they will cover the medication. Please be patient, you will be notified after a decision has been made.    Contact the nurse via [employer] or call [phone] if you have any questions or concerns. (Do not stop taking it if you don't think it's working. For some people it works without them knowing it.)     In order to get refills of this or any medication we prescribe you must be seen in the medical weight mgmt clinic every 2-4 months.    Using Your Mounjaro/Zepbound Pen  Medicine (tirzepatide)    Storing your pens  Store your pens in the refrigerator until you are ready to use them. Don't let them freeze.  Your pen may be stored at room temperature for 21 days or fewer. Just make sure the temperature doesn't get higher than 86  or lower than 46 degrees Fahrenheit.   Protect the pens from light.  Never use any pens that have .    Check your pen before use:  The liquid in the pen window should be clear or light yellow. Bubbles are okay to see.   Do not use the pen if you can see the window contains any specks, is cloudy, or has changed color.  Make sure you have the medication and dose your health care provider prescribed.    Getting ready to inject:   1. Wash and dry your hands well.  2. Make sure the counter you use to place your supplies on is clean.  3. Make sure your injection time will not be interrupted by children or pets.  4. Have alcohol wipes or alcohol and cotton balls available to clean the injection site.   5. Choose your injection site. It can be on your stomach, back of upper arms, or upper legs. Remember to change your injection site each time you inject. Try to be at least 1 inch away from the previous one. Stay at least 1 inch away from your belly button.       Inject your dose:  1. Each pen is prefilled with one dose of medicine.    2. Pull off the grey cap at the bottom of the pen. Throw it in the trash. Do not put the cap back on the pen.   3. Make sure your pen is in the locked position. You will find the lock at the top of the pen.   4. Clean the injection site with alcohol.   5. Place the clear part of the pen against your skin. Unlock the pen by turning it to the unlock  position at the top.   6. Press and hold the purple injection button at the top of the pen. Listen for 2 clicks. The last click tells you the injection has been completed.   7. This injection is given 1 day a week. If you need to change the day you inject, there needs to be at least 3 days or 72 hours between the two doses.  8. If you miss a dose, you may take the dose within 4 days or 96 hours of the missed dose.   9. Your injection may be best tolerated if given at night.     Disposing of your pens:  Dispose of your pens in a  puncture-resistant container (hard plastic bottle) or Sharps container.  Check with the county you live in on how to dispose of the container.  Do not recycle the container with used pens.       Of note, you may not be able to  your medication right away. It may require a prior authorization from your insurance company. This may take a week or more.

## 2024-12-18 NOTE — TELEPHONE ENCOUNTER
Prior Authorization Retail Medication Request    Medication/Dose: tirzepatide-Weight Management (ZEPBOUND) 2.5 MG/0.5ML prefilled pen  tirzepatide-Weight Management (ZEPBOUND) 5 MG/0.5ML prefilled pen  Diagnosis and ICD code (if different than what is on RX):    New/renewal/insurance change PA/secondary ins. PA:alisa elias   Previously Tried and Failed:  phentermine  Rationale:  wt loss    Insurance   Primary: [facility]  Insurance ID:  [id number]    Pharmacy Information (if different than what is on RX)  Name:  [facility]  Phone:  [phone]   Fax:[phone]    Clinic Information  Preferred routing pool for dept communication: [facility] pa med

## 2024-12-20 NOTE — TELEPHONE ENCOUNTER
MOm states that his previous diagnosis of Autism was rediagnosed at school to Asperger's.   PRIOR AUTHORIZATION DENIED    Medication: ZEPBOUND 5 MG/0.5ML SC SOAJ    Insurance Company: [employer] - Phone [phone] Fax [phone]    Denial Date: 12/20/2024    Denial Reason(s): Patient needs to try and fail Saxenda.     Appeal Information:

## 2024-12-20 NOTE — TELEPHONE ENCOUNTER
PA Initiation    Medication: ZEPBOUND 5 MG/0.5ML SC SOAJ  Insurance Company: [employer] - Phone [phone] Fax [phone]  Pharmacy Filling the Rx: [id number] - [place], [place] - [address] [facility] [address] [place]  Filling Pharmacy Phone: [phone]  Filling Pharmacy Fax: [phone]  Start Date: 12/19/2024

## 2024-12-20 NOTE — TELEPHONE ENCOUNTER
PRIOR AUTHORIZATION DENIED    Medication: ZEPBOUND 2.5 MG/0.5ML SC SOAJ    Insurance Company: [employer] - Phone [phone] Fax [phone]    Denial Date: 12/20/2024    Denial Reason(s): Patient needs to try and fail Saxenda.     Appeal Information:

## 2024-12-20 NOTE — TELEPHONE ENCOUNTER
PA Initiation    Medication: ZEPBOUND 2.5 MG/0.5ML SC SOAJ  Insurance Company: [employer] - Phone [phone] Fax [phone]  Pharmacy Filling the Rx: [facility] #[id number] - [place], [place] - [address] AT [facility] [address] [place]  Filling Pharmacy Phone: [phone]  Filling Pharmacy Fax: [phone]  Start Date: 12/19/2024

## 2025-01-14 ENCOUNTER — MYC MEDICAL ADVICE (OUTPATIENT)
Dept: SURGERY | Facility: CLINIC | Age: 45
End: 2025-01-14
Payer: COMMERCIAL

## 2025-01-14 ENCOUNTER — TELEPHONE (OUTPATIENT)
Dept: SURGERY | Facility: CLINIC | Age: 45
End: 2025-01-14
Payer: COMMERCIAL

## 2025-01-14 DIAGNOSIS — E66.01 MORBID OBESITY WITH BMI OF 50.0-59.9, ADULT (H): Primary | ICD-10-CM

## 2025-01-14 NOTE — TELEPHONE ENCOUNTER
General Call    Contacts       Contact Date/Time Type Contact Phone/Fax    01/14/2025 01:02 PM CST Phone (Incoming) Tiffany Hernandez (Self) [phone] (M)          Reason for Call:  call back     What are your questions or concerns:  pt would like a call back regarding her Zepbound prescription      Could we send this information to you in [employer] or would you prefer to receive a phone call?:   Patient would like to be contacted via [employer]

## 2025-01-14 NOTE — TELEPHONE ENCOUNTER
msg sent to pt - copied 12/23/24  msg sent re: starting Wegovy or Saxenda.  Janice Irwin, MS, RD, RN

## 2025-01-15 ENCOUNTER — TELEPHONE (OUTPATIENT)
Dept: SURGERY | Facility: CLINIC | Age: 45
End: 2025-01-15
Payer: COMMERCIAL

## 2025-01-15 NOTE — TELEPHONE ENCOUNTER
"Per KW: \"Would send Saxenda titration pen/directions with needle tips. Recommend MTM referral as well to help with monitoring with that titration and switching to maintenance. Really should be seen in 2-3 months     - Justina Nascimento PA-C   "

## 2025-01-15 NOTE — TELEPHONE ENCOUNTER
"Prior Authorization Retail Medication Request    Medication/Dose: liraglutide - Weight Management (SAXENDA) 18 MG/3ML pen  Diagnosis and ICD code (if different than what is on RX):  [E66.01, Z68.43]    New/renewal/insurance change PA/secondary ins. PA:  Previously Tried and Failed:  Diets, exercise, life style changes  Rationale:  Morbid obesity with BMI of 50.0-59.9, adult    12/18/2024  10:05 AM   Vital Signs    Weight (LB) 317 lb    Height 5' 5\"    BMI (Calculated) 52.75    AOM Considerations:  Phentermine:   Tried and hair loss  Topiramate:       GLP-1:        Denies Medullary Thyroid Ca (incl Fhx), MEN type II, denies pt was pancreatitis Hx, gallbladder problems or gastroparesis.      Naltrexone:        Wellbutrin:         Metformin:         Contrave:  Qsymia:        Insurance   Primary: [employer] [[id number]] - ARE [facility] [[id number]]   Insurance ID:    [id number]     Secondary (if applicable):PA  Insurance ID:  If applicable    Pharmacy Information (if different than what is on RX)  Name:  [facility]  Phone:  [phone]  Fax:[phone]    Clinic Information  Preferred routing pool for dept communication:     "

## 2025-01-16 ENCOUNTER — TELEPHONE (OUTPATIENT)
Dept: SURGERY | Facility: CLINIC | Age: 45
End: 2025-01-16
Payer: COMMERCIAL

## 2025-01-16 NOTE — TELEPHONE ENCOUNTER
MTM referral from: [place] clinic visit (referral by provider)    MTM referral outreach attempt #1 on January 16, 2025 at 2:33 PM      Outcome: Spoke with patient She is going to check with the pharmacy to see if she is able to get the medication and then she will call back to schedule    NextGxDXt Message Sent    [facility]

## 2025-01-17 NOTE — TELEPHONE ENCOUNTER
Central Prior Authorization Team - Phone: [phone]     PA Initiation    Medication: SAXENDA 18 MG/3ML SC SOPN  Insurance Company: [employer] - Phone [phone] Fax [phone]  Pharmacy Filling the Rx: [facility] #[id number] - [place], [place] - [address] AT [facility] [address] [place]  Filling Pharmacy Phone: [phone]  Filling Pharmacy Fax:    Start Date: 1/17/2025

## 2025-01-21 NOTE — TELEPHONE ENCOUNTER
Central Prior Authorization Team - Phone: [phone]     Prior Authorization Approval    Medication: SAXENDA 18 MG/3ML SC SOPN  Authorization Effective Date: 1/17/2025  Authorization Expiration Date: 7/17/2025  Approved Dose/Quantity: 15  Reference #:     Insurance Company: [employer] - Phone [phone] Fax [phone]  Expected CoPay: $    CoPay Card Available:      Financial Assistance Needed:   Which Pharmacy is filling the prescription: [facility] #[id number] - [place], [place] - [address] [facility] AT [facility]  Pharmacy Notified: YES  Patient Notified: YES Instructed pharmacy to notify patient once order is ready.

## 2025-02-21 ENCOUNTER — TELEPHONE (OUTPATIENT)
Dept: PHARMACY | Facility: CLINIC | Age: 45
End: 2025-02-21
Payer: COMMERCIAL

## 2025-02-21 NOTE — TELEPHONE ENCOUNTER
Detail Level: Detailed Patient needs to be rescheduled for their virtual visit due to Reason for Reschedule: Out-of-State    Scheduling team, please refer to service line late cancellation/no-show policies and reach out to patient at a later date for rescheduling.    Appointment mode: Video  Provider: Josephine Davis    Detail Level: Generalized Detail Level: Zone

## 2025-02-24 ENCOUNTER — TELEPHONE (OUTPATIENT)
Dept: SURGERY | Facility: CLINIC | Age: 45
End: 2025-02-24
Payer: COMMERCIAL

## 2025-02-24 NOTE — TELEPHONE ENCOUNTER
Returned pt's called re: Saxenda.  Pt seeing you.   Appt is not until Wednesday 2/26/25.    Pt id on Saxenda 2.4 - took last dose that was not quite full dose 2/22/25.  Pt was tolerating the previous dose.  Only thing was that pt felt thirsty.    Pt wondering if needs fill prior to the appt on 2/26.  Informed pt that pt can go 14 days between appts but will forward message to Faustino Burton who pt is seeing 2/26.  If wants to do anything different will let her know.  If not, will not hear from us until sees Josephine.    Pt informed can keep the pen out of fridge for 30 days.  Patient verbalized understanding and is agreeable to plan.    Plz let me know if you want to order or do differently and I will update pt.   Thx! Janice Irwin, MS, RD, RN

## 2025-02-24 NOTE — TELEPHONE ENCOUNTER
General Call    Contacts       Contact Date/Time Type Contact Phone/Fax    02/24/2025 09:21 AM CST Phone (Incoming) Tiffany Hernandez (Self) [phone] ()          Reason for Call:  call back    What are your questions or concerns:  pt would like a call back regarding Zepbound    Could we send this information to you in [facility] or would you prefer to receive a phone call?:   Patient would prefer a phone call   Okay to leave a detailed message?: Yes at Cell number on file:    Telephone Information:   Mobile [phone]

## 2025-02-26 ENCOUNTER — VIRTUAL VISIT (OUTPATIENT)
Dept: PHARMACY | Facility: CLINIC | Age: 45
End: 2025-02-26
Attending: PHYSICIAN ASSISTANT
Payer: COMMERCIAL

## 2025-02-26 VITALS — WEIGHT: 293 LBS | BODY MASS INDEX: 51.59 KG/M2

## 2025-02-26 DIAGNOSIS — E66.01 MORBID OBESITY WITH BMI OF 50.0-59.9, ADULT (H): Primary | ICD-10-CM

## 2025-02-26 DIAGNOSIS — Z78.9 TAKES DIETARY SUPPLEMENTS: ICD-10-CM

## 2025-02-26 DIAGNOSIS — D50.0 IRON DEFICIENCY ANEMIA DUE TO CHRONIC BLOOD LOSS: ICD-10-CM

## 2025-02-26 DIAGNOSIS — I10 BENIGN ESSENTIAL HYPERTENSION: ICD-10-CM

## 2025-02-26 ASSESSMENT — PAIN SCALES - GENERAL: PAINLEVEL_OUTOF10: NO PAIN (0)

## 2025-02-26 NOTE — Clinical Note
Renu going GREAT for Tiffany! She is going to continue on this for now and may pursue if weight loss slows after on 3mg Saxenda in the future. I see her again in May.  I asked schedulers to call her to schedule follow up with you this summer too.  Josephine

## 2025-02-26 NOTE — NURSING NOTE
Is the patient currently in the state of [place]? YES    Visit mode:VIDEO    If the visit is dropped, the patient can be reconnected by: VIDEO VISIT: Send to e-mail at: [email or web address]    Will anyone else be joining the visit? NO  (If patient encounters technical issues they should call [phone]    How would you like to obtain your AVS? MyChart    Are changes needed to the allergy or medication list? No    Are refills needed on medications prescribed by this physician? NO    Reason for visit: Medication Therapy Management    Louise MOTLEY

## 2025-02-26 NOTE — PROGRESS NOTES
Medication Therapy Management (MTM) Encounter    ASSESSMENT:                            Medication Adherence/Access: See below for considerations.    Weight management : discussed risk/benefit of starting Zepbound given more potent for weight management vs. Saxenda, but patient had severe nausea with Wegovy that made intolerable. Given Saxenda works well for patient, she elected to optimize Saxenda first and if weight management stalls, will purse Zepbound in the future. Given patient tolerating Saxenda well, but has been without a dose for 4 days, recommend repeat Saxenda 2.4 mg dose x 1 week then increase to optimized 3mg dose for safety and efficacy. Discussed dietary and behavioral modifications.       Hypertension: blood pressure not at goal <130/80 mmHg. May see some blood pressure reduction with weight loss. Given blood pressure at goal prior to last PCP visit, recommend home blood pressure monitoring at this time. Follow up with PCP if remains elevated    Supplements:  due for iron studies - Medication Therapy Management to coordinate with PCP.            PLAN:                            Saxenda reordered today to your pharmacy:   - since you have been off of this for a few days, repeat Saxenda 2.4mg dose daily for 1 week   - if you feel Saxenda 2.4 mg works well and no side effects after 1 week, you may increase to Saxenda 3 mg daily    To help with tolerability and effectiveness of Saxenda :  Eat 3 meals with protein focus daily to help with nausea. If you forget to eat, you may feel nausea as a hunger cue.  Focus on getting protein in first with each meal and snack.   A good starting goal is 60 g protein daily (track this, especially if at weight loss plateau). Once you consistently are getting 60g daily, try getting 90 g protein daily.  Drink plenty of water - goal 64 oz throughout the day  You may try Metamucil, Benefiber, or Citrucel to help feel more full (less nausea) and have softer, more consistent  bowel movements.  To optimize weight management - work on incorporating resistance training/weight lifting to build muscle and improve overall metabolism of adipose tissue.    Your blood pressure was elevated at your last visit with Dr. Berrios. Please monitor your blood pressure at home or at a community pharmacy weekly to monitor it. If it remains above 140/90 mmHg - please discuss with your PCP.    To prepare to take your blood pressure:  Do not consume any caffeinated beverages (tea, coffee, soda), do not smoke, do not exercise for 30 minutes before measuring your blood pressure.  Sit quietly for at least 5 minutes before starting to measure your blood pressure.    To measure your blood pressure:  Sit with both feet flat on the floor. Do not stand, do not lie down.  Place the cuff on your arm above your elbow so that your elbow can bend comfortably.  Pull the cuff tight enough to stay in place and allow for your fingers to fit between your arm and the cuff.  Position the cuff so that the cord lies down the inside of your arm.  Do not talk while you are using the machine.  Press the START button. It will squeeze your arm and then release slowly.   When you see the numbers on the screen, you are done.   Write the numbers down and the time of day so that you can track what they are.      You are due to follow up with labs for your iron supplement. Please make an appointment with your PCP to discuss these labs.    Follow-up:   Appointments in Next Year      May 01, 2025 1:00 PM  Pharmacist Visit with Josephine Davis RPH  [facility] MT ([facility] ) [phone]            SUBJECTIVE/OBJECTIVE:                          Tiffany Guevara is a 44 year old female seen for an initial visit. She was referred to me from [facility] .      Reason for visit: Medication Therapy Management weight management .    Allergies/ADRs: Reviewed in  chart  Past Medical History: Reviewed in chart  Tobacco: She reports that she has never smoked. She has never used smokeless tobacco.  Alcohol: not currently using    Medication Adherence/Access: Medication barriers: obtaining medication from insurance. Insurance won't cover Zepbound unless tried/failed Wegovy and Saxenda. Patient ok with this for now as Saxenda works well for her (better than Wegovy - see below).    Weight Management   Saxenda 2.4 mg once daily (last dose 4 days ago - ran out given had to miss Medication Therapy Management appointment due to travel last week)    12/18/24 Return Medical Weight Management Visit with Justina Nascimento PA-C    Patient reports no current medication side effects. Finds Saxenda works well for appetite and weight management. Tolerates better than Wegovy - no side effects and helps to limit serving size and eat less fried or fast foods which she feels is helping lose weight. Estimated down 10lbs, but body shape change is more pronounced she notes as clothes fitting better.    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          Exercise/Activity: active at work - working to balance being active at home with caring for family which can be a difficult balance..     Medications Tried/Failed/Considerations:  Wegovy: could not tolerate - severe nausea, not able to teat consistently    Per visit notes with Justina Nascimento PA-C with [facility]:    Phentermine:   Tried and hair loss  GLP-1:        Denies Medullary Thyroid Ca (incl Fhx), MEN type II, denies pt was pancreatitis Hx, gallbladder problems or gastroparesis.       Initial Consult Weight: 310 lbs (up to 317 lbs since starting with [facility])     Current weight today: 310 lbs 0 oz  Cumulative Weight Loss: -7 lb, -2.3% from  "highest     Wt Readings from Last 4 Encounters:   02/26/25 (!) 310 lb (140.6 kg)   12/18/24 317 lb (143.8 kg)   11/08/24 303 lb 12.8 oz (137.8 kg)   07/09/24 294 lb 9.6 oz (133.6 kg)     Estimated body mass index is 51.59 kg/m  as calculated from the following:    Height as of 12/18/24: 5' 5\" (1.651 m).    Weight as of this encounter: 310 lb (140.6 kg).      Hypertension   Labetalol 300 mg twice daily  Hydrocholorthiazide 25 mg once daily     Follows with Dr. Estefania Berrios  No issues reported.      Patient reports no current medication side effects.  BP Readings from Last 3 Encounters:   11/08/24 (!) 147/86   07/09/24 126/83   05/24/24 133/85         Supplements:   Iron gluconate 324 mg once daily  Multivitamin once daily     No issues reported.      Today's Vitals: Wt (!) 310 lb (140.6 kg)   BMI 51.59 kg/m    ----------------      I spent 26 minutes with this patient today. All changes were made via collaborative practice agreement with Justina Nascimento.     A summary of these recommendations was sent via cicayda.    Josephine Davis, Pharm D., MPH    Medication Therapy Management Pharmacist   [facility]      Telemedicine Visit Details  The patient's medications can be safely assessed via a telemedicine encounter.  Type of service:  Video Conference via ZipList  Originating Location (pt. Location): Home    Distant Location (provider location):  Off-site  Start Time:  1:05 PM  End Time:  1:31 PM     Medication Therapy Recommendations  Benign essential hypertension   1 Current Medication: labetalol (NORMODYNE) 300 MG tablet   Current Medication Sig: Take 1 tablet (300 mg) by mouth 2 times daily.   Rationale: Medication requires monitoring - Needs additional monitoring - Effectiveness   Recommendation: Self-Monitoring   Status: Patient Agreed - Adherence/Education   Identified Date: 2/26/2025 Completed Date: 2/26/2025         Morbid obesity with BMI of 50.0-59.9, adult (H)   1 Current " Medication: liraglutide - Weight Management (SAXENDA) 18 MG/3ML pen   Current Medication Sig: Inject 0.6 mg subcutaneously daily for 7 days, THEN 1.2 mg daily for 7 days, THEN 1.8 mg daily for 7 days, THEN 2.4 mg daily for 7 days, THEN 3 mg daily.   Rationale: Dose too low - Dosage too low - Effectiveness   Recommendation: Increase Dose   Status: Accepted per CPA   Identified Date: 2/26/2025 Completed Date: 2/26/2025

## 2025-02-26 NOTE — PATIENT INSTRUCTIONS
Recommendations from MTM Pharmacist visit:                                                    MTM (medication therapy management) is a service provided by a clinical pharmacist designed to help you get the most of out of your medicines.  You may be sent a phone or email survey evaluating today's visit.  Please provide feedback you have for the service he received today if you are able.      Saxenda reordered today to your pharmacy:   - since you have been off of this for a few days, repeat Saxenda 2.4mg dose daily for 1 week   - if you feel Saxenda 2.4 mg works well and no side effects after 1 week, you may increase to Saxenda 3 mg daily    To help with tolerability and effectiveness of Saxenda :  Eat 3 meals with protein focus daily to help with nausea. If you forget to eat, you may feel nausea as a hunger cue.  Focus on getting protein in first with each meal and snack.   A good starting goal is 60 g protein daily (track this, especially if at weight loss plateau). Once you consistently are getting 60g daily, try getting 90 g protein daily.  Drink plenty of water - goal 64 oz throughout the day  You may try Metamucil, Benefiber, or Citrucel to help feel more full (less nausea) and have softer, more consistent bowel movements.  To optimize weight management - work on incorporating resistance training/weight lifting to build muscle and improve overall metabolism of adipose tissue.    Your blood pressure was elevated at your last visit with Dr. Berrios. Please monitor your blood pressure at home or at a community pharmacy weekly to monitor it. If it remains above 140/90 mmHg - please discuss with your PCP.    To prepare to take your blood pressure:  Do not consume any caffeinated beverages (tea, coffee, soda), do not smoke, do not exercise for 30 minutes before measuring your blood pressure.  Sit quietly for at least 5 minutes before starting to measure your blood pressure.    To measure your blood pressure:  Sit with  "both feet flat on the floor. Do not stand, do not lie down.  Place the cuff on your arm above your elbow so that your elbow can bend comfortably.  Pull the cuff tight enough to stay in place and allow for your fingers to fit between your arm and the cuff.  Position the cuff so that the cord lies down the inside of your arm.  Do not talk while you are using the machine.  Press the START button. It will squeeze your arm and then release slowly.   When you see the numbers on the screen, you are done.   Write the numbers down and the time of day so that you can track what they are.      You are due to follow up with labs for your iron supplement. Please make an appointment with your PCP to discuss these labs.    Follow-up:   Appointments in Next Year      May 01, 2025 1:00 PM  Pharmacist Visit with Josephine Davis RPH  [facility] MTM ([facility] ) [phone]           It was great speaking with you today.  I value your experience and would be very thankful for your time in providing feedback in our clinic survey. In the next few days, you may receive an email or text message from [email or web address] with a link to a survey related to your  clinical pharmacist.\"     To schedule another MTM appointment, please call the clinic directly ([facility] Phone Number: [phone] (schedules for [facility] - providers, dietitians, health coaches) or you may call the MTM scheduling line at [phone] or toll-free at [phone].     My Clinical Pharmacist's contact information:                                                      Please feel free to contact me with any questions or concerns you have.      Josephine Davis, Pharm D., MPH    Medication Therapy Management Pharmacist   [facility]          Meal Replacement Shake Options:   *Protein Shake Criteria: no more than 210 " Calories, at least 20 grams of protein, and less than 10 grams of sugar   Premier Protein (160 Calories, 30 g protein)  Slim Fast Advanced Nutrition (180 Calories, 20 g protein)  Muscle Milk, lactose-free, 17 oz bottle (210 Calories, 30 g protein)  Integrated Supplements, no artificial sugars (110 Calories, 20 g protein)  Boost/Ensure Max (160 calories, 30 gm protein)   Fairlife Protein Shakes (160-230 calories, 26-42 gm protein)  Aldi's [facility] Protein Powder (180 calories, 30 gm protein)   Orgain Protein Shakes (130-160 calories, 20-26 gm protein)     Meal Replacement Bar Options:  Quest Protein Bars (190 Calories, 20 g protein)  Built Bar (170 Calories, 15-20 g protein)  One Protein Bar (210 calories, 20 g protein)  Withams Signature Protein Bar ([employer]) (190 Calories, 21 g protein)  Pure Protein Bars (180 Calories, 21 g protein)    Low Calorie Frozen Meal:  Healthy Choice Power Bowls  Lean Cuisine  Smart Ones  William Ames      ---------------------------------------------------------------  Tips to Increase the Protein in Your Diet  You may need more protein in your diet to help you heal from an illness, surgery or wound. Extra protein can also help you gain weight. Here are some ideas for adding high-protein foods to your meals.  Meat and fish  Add chopped cooked meat to vegetables, salads, casseroles, soups, sauces and biscuit dough.  Use in omelets, soufflés, quiches, sandwich fillings and chicken or turkey stuffing.  Wrap in pie crust or biscuit dough to make a turnover.  Add to stuffed baked potatoes.  Make a dip with diced meat or flaked fish mixed with sour cream and spices.  Chopped, hard-cooked eggs  Add to salads.  Use for snacks and sandwich filling.  High-protein milk  To make high-protein milk, mix 1 quart whole milk with 1 cup powdered milk.  Add to cream soups, mashed potatoes, scrambled eggs, cereals and dried eggnog mix.  Use as an ingredient in puddings, custards, hot chocolate, milk  shakes and pancakes.  Powdered milk  If you don't have any high-protein milk on hand, you can use powdered milk. Add 3 tablespoons to:  gravies, sauces, cream soups, mayonnaise  casseroles, meat patties, meatloaf, tuna salad, deviled ham  scalloped or mashed potatoes, creamed spinach  scrambled eggs, egg salad  cereals  yogurt, milk drinks, ice cream, frozen desserts, puddings, custards.  Add 4 to 6 tablespoons powdered milk to make:  cream sauces  breads, muffins, pancakes, waffles, cookies, cakes  cream pies, frostings, cake fillings  fruit cobblers, bread or rice pudding, gelatin desserts.  For high-protein eggnog, add 3 to 6 tablespoons powdered milk to prepared eggnog.  Hard or soft cheese  Melt on sandwiches, breads, tortillas, hamburgers, hot dogs, other meats, vegetables, eggs and pies.  Grate into soups, chili, sauces, casseroles, vegetables, potatoes, rice, noodles or meatloaf.  Eat with toast or crackers, or melt for cindy dip.  Cottage cheese or ricotta cheese  Mix with or scoop on top of fruits and vegetables.  Add to casseroles, lasagna, spaghetti, noodles and egg dishes (omelets, scrambled eggs, soufflés).  Use in gelatin, pudding-type desserts, cheesecake and pancake batter.  Use to stuff crepes, pasta shells or manicotti.  Fruit yogurt  Blend with fruits for a fruit smoothie.  Use as a dip for fruits and vegetables.  Scoop on top of pancakes or waffles.  Tofu  Blend silken tofu with fruits and juices for a smoothie.  Add chunks of firm tofu to soups and stews, or crumble into meatloaf.  Blend dried onion soup mix into soft or silken tofu for dip.  Use pureed silken tofu for part of the mayonnaise, sour cream, cream cheese or ricotta cheese called for in recipes.  Beans  Use cooked beans or peas in soups, casseroles, pasta, tacos and burritos.  Nuts and seeds  Note: For children under 3, discuss with the child's care team.  Use in casseroles, breads, muffins, pancakes, cookies and waffles.  Sprinkle  on fruits, cereals, ice cream, yogurt, vegetables and salads.  Mix with raisins, dried fruits and chocolate chips for a snack.  Nut butters  Note: For children under 3, discuss with the child's care team.  Spread on sandwiches, toast, muffins, crackers, waffles, pancakes and fruit slices.  Use as a dip for raw vegetables.  Blend with milk drinks, or swirl through ice cream, yogurt or hot cereal.  Nutrition supplements (nutrition bars, drinks and powders)  Add powders to milk drinks and desserts.  Mix with ice cream, milk and fruit for a high-protein milk shake.    For informational purposes only. Not to replace the advice of your health care provider. Clinically reviewed by Sondra Thompson, TENZIN, VIMAL, and the Clinical Nutrition Service Line. Copyright   2005 [facility], [place]. All rights reserved. [employer] 531240 - REV 04/24.      -----------------------------------------------------------------------------------------------------------------  Learning About High-Protein Foods  What foods are high in protein?     The foods you eat contain nutrients, such as vitamins and minerals. Protein is a nutrient. Your body needs the right amount to stay healthy and work as it should. You can use the list below to help you make choices about which foods to eat.  Here are some examples of foods that are high in protein.  Dairy and dairy alternatives  Cheese  Milk  Soy milk  Yogurt (especially Greek)  Meat  Beef  Chicken  Ham  Lamb  Lunch meat  Pork  Sausage  Turkey  Other protein foods  Hemp seeds!  Beans (black, garbanzo, kidney, lima)  Eggs  Hummus  Lentils  Nuts  Peanut butter and other nut butters  Peas  Soybeans  Tofu  Veggie or soy liborio (Check the nutrition label for the amount of protein in each serving.)  Seafood  Anchovies  Cod  Crab  Halibut  Drury  Sardines  Shrimp  Tilapia  Patillas  Tuna  Protein supplements  Bars (Check the nutrition label for the amount of protein in each  "serving.)  Drinks  Powders  Work with your doctor to find out how much of this nutrient you need. Depending on your health, you may need more or less of it in your diet.  Where can you learn more?  Go to https://www.[employer].net/patiented  Enter P335 in the search box to learn more about \"Learning About High-Protein Foods.\"  Current as of: September 20, 2023               Content Version: 14.0    1182-0343 feedPack.   Care instructions adapted under license by your healthcare professional. If you have questions about a medical condition or this instruction, always ask your healthcare professional. [employer], [employer] disclaims any warranty or liability for your use of this information.         "

## 2025-02-27 ENCOUNTER — TELEPHONE (OUTPATIENT)
Dept: PHARMACY | Facility: CLINIC | Age: 45
End: 2025-02-27
Payer: COMMERCIAL

## 2025-02-27 NOTE — TELEPHONE ENCOUNTER
2/27 left  for staff oJsephine Cook, [facility]  P [facility] Bariatric Scheduling Registration Pool  Hi folks!    Can someone call Tiffany and get her on [employer]'s calendar for some time this summer when she has an opening?    THank you!!  Josephine

## 2025-02-28 ENCOUNTER — TELEPHONE (OUTPATIENT)
Dept: PHARMACY | Facility: CLINIC | Age: 45
End: 2025-02-28
Payer: COMMERCIAL

## 2025-02-28 NOTE — TELEPHONE ENCOUNTER
General Call    Contacts       Contact Date/Time Type Contact Phone/Fax    02/28/2025 02:35 PM CST Phone (Incoming) Tiffany Hernandez (Self) [phone] (M)          Reason for Call:  call back     What are your questions or concerns:  pt would like a call back regarding her medication    Could we send this information to you in [place] or would you prefer to receive a phone call?:   Patient would prefer a phone call   Okay to leave a detailed message?: Yes at Cell number on file:    Telephone Information:   Mobile [phone]

## 2025-03-03 ENCOUNTER — TELEPHONE (OUTPATIENT)
Dept: SURGERY | Facility: CLINIC | Age: 45
End: 2025-03-03
Payer: COMMERCIAL

## 2025-03-07 NOTE — TELEPHONE ENCOUNTER
03/07/25 called [facility] to check on Appeal status for Zepbound, Rep verified they received on 03/03/2025, and Appeal is pending with a decision date by 04/02/2025

## 2025-03-07 NOTE — TELEPHONE ENCOUNTER
Medication Appeal Initiation    Medication: ZEPBOUND 2.5 MG/0.5ML SC SOLN  Appeal Start Date:  3/3/2025  Insurance Company: [employer]  Insurance Phone: [phone]  Insurance Fax: [phone]  Comments:    Faxed appeal letter on 03/03/25

## 2025-03-10 ENCOUNTER — MYC MEDICAL ADVICE (OUTPATIENT)
Dept: PHARMACY | Facility: CLINIC | Age: 45
End: 2025-03-10

## 2025-03-10 ENCOUNTER — OFFICE VISIT (OUTPATIENT)
Dept: URGENT CARE | Facility: URGENT CARE | Age: 45
End: 2025-03-10
Payer: COMMERCIAL

## 2025-03-10 VITALS
BODY MASS INDEX: 51.72 KG/M2 | OXYGEN SATURATION: 97 % | WEIGHT: 293 LBS | RESPIRATION RATE: 16 BRPM | HEART RATE: 71 BPM | SYSTOLIC BLOOD PRESSURE: 138 MMHG | TEMPERATURE: 98 F | DIASTOLIC BLOOD PRESSURE: 76 MMHG

## 2025-03-10 DIAGNOSIS — I10 ESSENTIAL HYPERTENSION: ICD-10-CM

## 2025-03-10 DIAGNOSIS — R42 DIZZINESS: Primary | ICD-10-CM

## 2025-03-10 PROCEDURE — 3078F DIAST BP <80 MM HG: CPT | Performed by: EMERGENCY MEDICINE

## 2025-03-10 PROCEDURE — 3075F SYST BP GE 130 - 139MM HG: CPT | Performed by: EMERGENCY MEDICINE

## 2025-03-10 PROCEDURE — 99214 OFFICE O/P EST MOD 30 MIN: CPT | Performed by: EMERGENCY MEDICINE

## 2025-03-10 NOTE — PROGRESS NOTES
Assessment & Plan     Diagnosis:    ICD-10-CM    1. Dizziness  R42       2. Essential hypertension  I10    Continue your Losartan and HTCZ and follow up with PCP for HTN management; check daily and journal to aid PCP decision making.       Medical Decision Making:  Tiffany Guevara is a 44 year old female who presents for evaluation of lightheadedness.  This is clearly not a vertiginous sensation so would not do further workup for peripheral or central vertigo.  A broad differential of their lightheadedness was considered including anemia, electrolyte abnormality, medication, stroike, ACS, orthostasis, dissection, dehydration, presyncope, arrhythmias, metabolic disturbances, endocrine disturbances, hyperglycemia, infection, etc. patient was off of her blood pressure medication for about a weeks and restarted this 4 days ago.  She woke up in the middle the night and stood up very quickly and felt lightheaded/dizzy, sat back down and has not experienced the symptoms since.  No LOC.  She is hemodynamically stable here.  She does have some ongoing abdominal cramping but has history of fibroids and is following with OB next week.  No concerns for STDs or PID.  No serious etiologies of lightheadedness were detected today.  Supportive outpatient management is indicated.       Nahun Rose PA-C  [facility]    Subjective     Tiffany Guevara is a 44 year old female who presents to clinic today for the following health issues:  Chief Complaint   Patient presents with    Dizziness     In the middle of the night last night felt dizzy - felt dizzy for an hour. Does not feel good don't know what is going on.     Hypertension     Hx of  elevated BP    Abdominal Cramping     Pt is having irregular menstrual cycle, felt some tightness on left side of abdomin - also  noticed some light pinkish discharge. Pt tubes is tied - would like to confirm she is not having a tubal pregnancy. Sx going on for 1  "week       HPI    Ismael is a 44 year old female with hypertension presenting with dizziness/lightheadedness. She says she had a 1-hour episode of lightheadedness upon waking last night and standing up \"really fast\" to run to the bathroom. She is concerned her blood pressure is high. She missed 3 days of her labetalol and hydrochlorothiazide last week but has been taking all her medications since. Denies headaches or vision changes. No room spinning dizziness.    Patient denies any chest pain, shortness of breath, palpitations, or cough. No nausea, vomiting, or diarrhea.     Review of Systems    See HPI    Objective      Vitals: /76 (BP Location: Right arm, Patient Position: Sitting, Cuff Size: Adult Large)   Pulse 71   Temp 98  F (36.7  C) (Tympanic)   Resp 16   Wt (!) 141 kg (310 lb 12.8 oz)   SpO2 97%   BMI 51.72 kg/m        Patient Vitals for the past 24 hrs:   BP Temp Temp src Pulse Resp SpO2 Weight   03/10/25 1723 138/76 -- -- -- -- -- --   03/10/25 1622 (!) 163/93 98  F (36.7  C) Tympanic 71 16 97 % (!) 141 kg (310 lb 12.8 oz)       Vital signs reviewed by: Nahun Rose PA-C    Physical Exam   Constitutional: Patient is alert and cooperative. No acute distress.  Ears: External ear canals are normal.  Mouth: Mucous membranes are moist. Normal tongue and tonsil. Posterior oropharynx is clear.  Eyes: Conjunctivae and lids are normal. PERRL.  Cardiovascular: Regular rate and rhythm. No murmurs, rubs or gallops.  Pulmonary/Chest: Lungs are clear to auscultation throughout. Effort normal. No respiratory distress. No wheezes, rales or rhonchi.  GI: Abdomen is soft and non-tender throughout.  Neurological: Alert and oriented x3. Strength and sensation are intact and symmetric in the bilateral upper and lower extremities. CN 3-7 & 9-12 intact.  Skin: No rash noted on visualized skin.  Psychiatric:The patient has a normal mood and affect.       Nahun Rose PA-C, March 10, 2025      "

## 2025-03-11 NOTE — TELEPHONE ENCOUNTER
Clinical Pharmacy Note    Called [place]/ [facility] per patient request to evaluate status update of Zepbound appeal.    Spoke to Emma - had to transfer to [facility] for appeals info [phone].    Spoke to Ariel with [facility] on update of Zepbound Appeal. Per process - no determination until 4/2/25. Requested to zac as urgent given patient out of med.    Was not marked urgent - this was corrected and expedited. Expect this to be resulted within next several days. No specific date they could give.    Will update patient.      Josephine Davis, Pharm D., MPH    Medication Therapy Management Pharmacist   [facility]

## 2025-03-11 NOTE — TELEPHONE ENCOUNTER
MEDICATION APPEAL APPROVED    Medication: ZEPBOUND 2.5 MG/0.5ML SC SOLN  Authorization Effective Date: 3/11/2025  Authorization Expiration Date: 9/11/2025  Approved Dose/Quantity: 2  Reference #:     Appeal Insurance Company: [employer]  Expected CoPay: $       CoPay Card Available:    Financial Assistance Needed: no  Filling Pharmacy:    Patient Notified: sent rx to pharmacy and called patient  Comments:

## 2025-04-08 ENCOUNTER — PATIENT OUTREACH (OUTPATIENT)
Dept: CARE COORDINATION | Facility: CLINIC | Age: 45
End: 2025-04-08

## 2025-04-22 ENCOUNTER — PATIENT OUTREACH (OUTPATIENT)
Dept: CARE COORDINATION | Facility: CLINIC | Age: 45
End: 2025-04-22
Payer: COMMERCIAL

## 2025-05-01 ENCOUNTER — VIRTUAL VISIT (OUTPATIENT)
Dept: PHARMACY | Facility: CLINIC | Age: 45
End: 2025-05-01
Attending: PHYSICIAN ASSISTANT
Payer: COMMERCIAL

## 2025-05-01 VITALS — BODY MASS INDEX: 51.59 KG/M2 | WEIGHT: 293 LBS

## 2025-05-01 DIAGNOSIS — I10 ESSENTIAL HYPERTENSION: ICD-10-CM

## 2025-05-01 DIAGNOSIS — E66.01 MORBID OBESITY WITH BMI OF 50.0-59.9, ADULT (H): Primary | ICD-10-CM

## 2025-05-01 RX ORDER — HYDROCHLOROTHIAZIDE 25 MG/1
25 TABLET ORAL
Qty: 90 TABLET | Refills: 0 | Status: SHIPPED | OUTPATIENT
Start: 2025-05-01

## 2025-05-01 ASSESSMENT — PAIN SCALES - GENERAL: PAINLEVEL_OUTOF10: NO PAIN (0)

## 2025-05-01 NOTE — PROGRESS NOTES
Medication Therapy Management (MTM) Encounter    ASSESSMENT:                            Medication Adherence/Access: No issues identified.    Weight management :   Given patient tolerating Zepbound well, but not realizing significant weight management, recommend dose increase. Do not believe Zepbound to be worsening mental health- significant enviornmental factors and recommend further discussion with PCP tomorrow). Recommend dose increase Zepbound to first clinical dose. Will continue to follow closely given history of poor tolerability with Ozempic; tolerated Saxenda well and favorable side effect profile with Zepbound. Discussed dietary and behavioral modifications for medication safety and efficacy.       PLAN:                            Increase Zepbound to 5 mg weekly - sent to   [facility] #[id number] - [place], [place] - [address] AT [address]       To help with tolerability and effectiveness of Zepbound :  Eat 3 meals with protein focus daily to help with nausea. If you forget to eat, you may feel nausea as a hunger cue.  Focus on getting protein in first with each meal and snack.   A good starting goal is 60 g protein daily (track this, especially if at weight loss plateau). Once you consistently are getting 60g daily, try getting 90 g protein daily.  See list below of protein-rich food ideas  Drink plenty of water - goal 64 oz throughout the day  You may try Metamucil, Benefiber, or Citrucel to help feel more full (less nausea) and have softer, more consistent bowel movements.  To optimize weight management - work on incorporating resistance training/weight lifting to build muscle and improve overall metabolism of adipose tissue.      Follow-up:   Appointments in Next Year      May 08, 2025 2:30 PM  (Arrive by 2:10 PM)  Provider Visit with Praneeth Mai MD  [facility] ([facility]) [phone]     Jun 18, 2025  1:00 PM  Pharmacist Visit with Josephine aDvis RPH  [facility] ([facility] ) [phone]     Aug 12, 2025 11:30 AM  (Arrive by 11:20 AM)  Return Weight Management Visit with Justina Nascimento PA-C  [facility] ([facility] ) [phone]            SUBJECTIVE/OBJECTIVE:                          Tiffany Guevara is a 44 year old female seen for a follow-up visit.       Reason for visit: Medication Therapy Management - weight management .    Allergies/ADRs: Reviewed in chart  Past Medical History: Reviewed in chart  Tobacco: She reports that she has never smoked. She has never used smokeless tobacco.  Alcohol: not currently using    Medication Adherence/Access: no issues reported.    Weight Management   Zepbound 2.5 mg weekly x 8 weeks    12/18/24 Return Medical Weight Management Visit with Justina Nascimento PA-C; next visit 8/12/25    Patient reports no GI side effects. Feeling more anxiety and wonders if this is med or life circumstances (feels like has been needing treatment for a while and now feeling ready to treat - has PCP visit tomorrow to discuss). Feels Zepbound helpful overall - would like to increase as no effects like semaglutide at this time.    Nutrition/Eating Habits: working on increasing protein - this feels good and satiated with this. Working to add fruits, veggies, whole grains.    Increasing water - goal to get 64 oz per day (gets close most days)          Exercise/Activity: active at work - working to balance being active at home with caring for family which can be a difficult balance..     Medications Tried/Failed/Considerations:  Wegovy: could not tolerate - severe nausea, not able to teat consistently    Per visit notes with Justina Nascimento PA-C with [facility]:    Phentermine:   Tried and hair loss  GLP-1:        Denies Medullary Thyroid Ca (incl Fhx),  "MEN type II, denies pt was pancreatitis Hx, gallbladder problems or gastroparesis.       Initial Consult Weight: 310 lbs (up to 317 lbs since [facility])         Current weight today: 310 lbs 0 oz  Cumulative Weight Loss: -7 lb, -2.2% from  highest    Wt Readings from Last 4 Encounters:   05/01/25 (!) 310 lb (140.6 kg)   03/10/25 (!) 310 lb 12.8 oz (141 kg)   02/26/25 (!) 310 lb (140.6 kg)   12/18/24 317 lb (143.8 kg)     Estimated body mass index is 51.59 kg/m  as calculated from the following:    Height as of 12/18/24: 5' 5\" (1.651 m).    Weight as of this encounter: 310 lb (140.6 kg).        Today's Vitals: Wt (!) 310 lb (140.6 kg)   BMI 51.59 kg/m    ----------------      I spent 19 minutes with this patient today. All changes were made via collaborative practice agreement with Justina Nascimento.     A summary of these recommendations was sent via [employer].    Josephine Davis, Pharm D., MPH    Medication Therapy Management Pharmacist   [facility]      Telemedicine Visit Details  The patient's medications can be safely assessed via a telemedicine encounter.  Type of service:  Telephone visit  Originating Location (pt. Location): Home    Distant Location (provider location):  Off-site  Start Time:  1:04 PM  End Time: 1:23 PM     Medication Therapy Recommendations  Morbid obesity with BMI of 50.0-59.9, adult (H)   1 Current Medication: tirzepatide-Weight Management (ZEPBOUND) 2.5 MG/0.5ML prefilled pen (Discontinued)   Current Medication Sig: Inject 0.5 mLs (2.5 mg) subcutaneously every 7 days.   Rationale: Dose too low - Dosage too low - Effectiveness   Recommendation: Increase Dose   Status: Accepted per CPA   Identified Date: 5/1/2025 Completed Date: 5/1/2025            "

## 2025-05-01 NOTE — NURSING NOTE
Current patient location: [address] [place] [address] N    [facility] [address]    Is the patient currently in the state of [place]? YES    Visit mode: VIDEO    If the visit is dropped, the patient can be reconnected by:VIDEO VISIT: Text to cell phone:   Telephone Information:   Mobile [phone]    and VIDEO VISIT: Send to e-mail at: [email or web address]    Will anyone else be joining the visit? NO  (If patient encounters technical issues they should call [phone]    Are changes needed to the allergy or medication list? No    Are refills needed on medications prescribed by this physician? NO    Rooming Documentation:  Questionnaire(s) completed    Reason for visit: Medication Therapy Management    Paty Talley VVF    Pt states would like to go up in dose for the zepbound due to not seeing any help in suppressing appetite and would like to look into starting a med to help with anxiety

## 2025-05-01 NOTE — Clinical Note
Tiffany is tolerating Zepbound well (severe rxn to semaglutide in the past, but tolerated Saxenda - so monitoring closely). We increased to Zepbound 5mg today and I will see her again in June, sees you in Aug so have good follow up to help monitor for safety and efficacy!  Josephine

## 2025-05-01 NOTE — PATIENT INSTRUCTIONS
Recommendations from MTM Pharmacist visit:                                                    MTM (medication therapy management) is a service provided by a clinical pharmacist designed to help you get the most of out of your medicines.  You may be sent a phone or email survey evaluating today's visit.  Please provide feedback you have for the service he received today if you are able.      Increase Zepbound to 5 mg weekly - sent to   [facility] #[id number] - [place], [place] - [address] AT [address]       To help with tolerability and effectiveness of Zepbound :  Eat 3 meals with protein focus daily to help with nausea. If you forget to eat, you may feel nausea as a hunger cue.  Focus on getting protein in first with each meal and snack.   A good starting goal is 60 g protein daily (track this, especially if at weight loss plateau). Once you consistently are getting 60g daily, try getting 90 g protein daily.  See list below of protein-rich food ideas  Drink plenty of water - goal 64 oz throughout the day  You may try Metamucil, Benefiber, or Citrucel to help feel more full (less nausea) and have softer, more consistent bowel movements.  To optimize weight management - work on incorporating resistance training/weight lifting to build muscle and improve overall metabolism of adipose tissue.      Follow-up:   Appointments in Next Year      May 08, 2025 2:30 PM  (Arrive by 2:10 PM)  [facility] [phone]     Jun 18, 2025 1:00 PM  Pharmacist Visit with Josephine Davis RPH  [facility] ([facility] ) [phone]     Aug 12, 2025 11:30 AM  (Arrive by 11:20 AM)  Return Weight Management Visit with Justina Nascimento PA-C  [facility] ([facility] )  "[phone]                It was great speaking with you today.  I value your experience and would be very thankful for your time in providing feedback in our clinic survey. In the next few days, you may receive an email or text message from [facility] with a link to a survey related to your  clinical pharmacist.\"     To schedule another MT appointment, please call the clinic directly ([facility] Phone Number: [phone] (schedules for [facility] and [facility] - providers, dietitians, health coaches) or you may call the MTM scheduling line at [phone] or toll-free at [phone].     My Clinical Pharmacist's contact information:                                                      Please feel free to contact me with any questions or concerns you have.      Josephine Davis, Pharm D., MPH    Medication Therapy Management Pharmacist   [facility]          Meal Replacement Shake Options:   *Protein Shake Criteria: no more than 210 Calories, at least 20 grams of protein, and less than 10 grams of sugar   Premier Protein (160 Calories, 30 g protein)  Slim Fast Advanced Nutrition (180 Calories, 20 g protein)  Muscle Milk, lactose-free, 17 oz bottle (210 Calories, 30 g protein)  Integrated Supplements, no artificial sugars (110 Calories, 20 g protein)  Boost/Ensure Max (160 calories, 30 gm protein)   Fairlife Protein Shakes (160-230 calories, 26-42 gm protein)  Aldi's Elevation Protein Powder (180 calories, 30 gm protein)   Orgain Protein Shakes (130-160 calories, 20-26 gm protein)     Meal Replacement Bar Options:  Quest Protein Bars (190 Calories, 20 g protein)  Built Bar (170 Calories, 15-20 g protein)  One Protein Bar (210 calories, 20 g protein)  [place] Signature Protein Bar ([employer]) (190 Calories, 21 g protein)  Pure Protein Bars (180 Calories, 21 g protein)    Low Calorie Frozen Meal:  Healthy Choice Power Bowls  Lean " Leslee Meng  William Becerramelissa Ames      ---------------------------------------------------------------  Tips to Increase the Protein in Your Diet  You may need more protein in your diet to help you heal from an illness, surgery or wound. Extra protein can also help you gain weight. Here are some ideas for adding high-protein foods to your meals.  Meat and fish  Add chopped cooked meat to vegetables, salads, casseroles, soups, sauces and biscuit dough.  Use in omelets, soufflés, quiches, sandwich fillings and chicken or turkey stuffing.  Wrap in pie crust or biscuit dough to make a turnover.  Add to stuffed baked potatoes.  Make a dip with diced meat or flaked fish mixed with sour cream and spices.  Chopped, hard-cooked eggs  Add to salads.  Use for snacks and sandwich filling.  High-protein milk  To make high-protein milk, mix 1 quart whole milk with 1 cup powdered milk.  Add to cream soups, mashed potatoes, scrambled eggs, cereals and dried eggnog mix.  Use as an ingredient in puddings, custards, hot chocolate, milk shakes and pancakes.  Powdered milk  If you don't have any high-protein milk on hand, you can use powdered milk. Add 3 tablespoons to:  gravies, sauces, cream soups, mayonnaise  casseroles, meat patties, meatloaf, tuna salad, deviled ham  scalloped or mashed potatoes, creamed spinach  scrambled eggs, egg salad  cereals  yogurt, milk drinks, ice cream, frozen desserts, puddings, custards.  Add 4 to 6 tablespoons powdered milk to make:  cream sauces  breads, muffins, pancakes, waffles, cookies, cakes  cream pies, frostings, cake fillings  fruit cobblers, bread or rice pudding, gelatin desserts.  For high-protein eggnog, add 3 to 6 tablespoons powdered milk to prepared eggnog.  Hard or soft cheese  Melt on sandwiches, breads, tortillas, hamburgers, hot dogs, other meats, vegetables, eggs and pies.  Grate into soups, chili, sauces, casseroles, vegetables, potatoes, rice, noodles or meatloaf.  Eat  with toast or crackers, or melt for cindy dip.  Cottage cheese or ricotta cheese  Mix with or scoop on top of fruits and vegetables.  Add to casseroles, lasagna, spaghetti, noodles and egg dishes (omelets, scrambled eggs, soufflés).  Use in gelatin, pudding-type desserts, cheesecake and pancake batter.  Use to stuff crepes, pasta shells or manicotti.  Fruit yogurt  Blend with fruits for a fruit smoothie.  Use as a dip for fruits and vegetables.  Scoop on top of pancakes or waffles.  Tofu  Blend silken tofu with fruits and juices for a smoothie.  Add chunks of firm tofu to soups and stews, or crumble into meatloaf.  Blend dried onion soup mix into soft or silken tofu for dip.  Use pureed silken tofu for part of the mayonnaise, sour cream, cream cheese or ricotta cheese called for in recipes.  Beans  Use cooked beans or peas in soups, casseroles, pasta, tacos and burritos.  Nuts and seeds  Note: For children under 3, discuss with the child's care team.  Use in casseroles, breads, muffins, pancakes, cookies and waffles.  Sprinkle on fruits, cereals, ice cream, yogurt, vegetables and salads.  Mix with raisins, dried fruits and chocolate chips for a snack.  Nut butters  Note: For children under 3, discuss with the child's care team.  Spread on sandwiches, toast, muffins, crackers, waffles, pancakes and fruit slices.  Use as a dip for raw vegetables.  Blend with milk drinks, or swirl through ice cream, yogurt or hot cereal.  Nutrition supplements (nutrition bars, drinks and powders)  Add powders to milk drinks and desserts.  Mix with ice cream, milk and fruit for a high-protein milk shake.    For informational purposes only. Not to replace the advice of your health care provider. Clinically reviewed by Sondra Thompson, TENZIN, LD, and the Clinical Nutrition Service Line. Copyright   2005 [facility]. All rights reserved. [employer] [id number] - REV  "04/24.      -----------------------------------------------------------------------------------------------------------------  Learning About High-Protein Foods  What foods are high in protein?     The foods you eat contain nutrients, such as vitamins and minerals. Protein is a nutrient. Your body needs the right amount to stay healthy and work as it should. You can use the list below to help you make choices about which foods to eat.  Here are some examples of foods that are high in protein.  Dairy and dairy alternatives  Cheese  Milk  Soy milk  Yogurt (especially Greek)  Meat  Beef  Chicken  Ham  Lamb  Lunch meat  Pork  Sausage  Turkey  Other protein foods  Hemp seeds!  Beans (black, garbanzo, kidney, lima)  Eggs  Hummus  Lentils  Nuts  Peanut butter and other nut butters  Peas  Soybeans  Tofu  Veggie or soy liborio (Check the nutrition label for the amount of protein in each serving.)  Seafood  Anchovies  Cod  Crab  Halibut  [place]  Sardines  Shrimp  Tilapia  [place]  Tuna  Protein supplements  Bars (Check the nutrition label for the amount of protein in each serving.)  Drinks  Powders  Work with your doctor to find out how much of this nutrient you need. Depending on your health, you may need more or less of it in your diet.  Where can you learn more?  Go to https://www.[employer].net/patiented  Enter P335 in the search box to learn more about \"Learning About High-Protein Foods.\"  Current as of: September 20, 2023               Content Version: 14.0    4409-0632 [employer].   Care instructions adapted under license by your healthcare professional. If you have questions about a medical condition or this instruction, always ask your healthcare professional. [employer] disclaims any warranty or liability for your use of this information.         "

## 2025-05-21 ENCOUNTER — MYC REFILL (OUTPATIENT)
Dept: PHARMACY | Facility: CLINIC | Age: 45
End: 2025-05-21
Payer: COMMERCIAL

## 2025-05-21 DIAGNOSIS — E66.01 MORBID OBESITY WITH BMI OF 50.0-59.9, ADULT (H): ICD-10-CM

## 2025-05-22 NOTE — TELEPHONE ENCOUNTER
Too early - provided 5 refills 5/1/25.  Refused per clinic protocol and pt notified.  Janice Irwin, MS, RD, RN

## 2025-06-18 ENCOUNTER — VIRTUAL VISIT (OUTPATIENT)
Dept: PHARMACY | Facility: CLINIC | Age: 45
End: 2025-06-18
Attending: PHYSICIAN ASSISTANT
Payer: COMMERCIAL

## 2025-06-18 VITALS — BODY MASS INDEX: 49.92 KG/M2 | WEIGHT: 293 LBS

## 2025-06-18 DIAGNOSIS — E66.01 MORBID OBESITY WITH BMI OF 50.0-59.9, ADULT (H): Primary | ICD-10-CM

## 2025-06-18 ASSESSMENT — PAIN SCALES - GENERAL: PAINLEVEL_OUTOF10: NO PAIN (0)

## 2025-06-18 NOTE — NURSING NOTE
Is the patient currently in the state of [place]? yes    Location: vehicle, not driving    Visit mode:VIDEO    If the visit is dropped, the patient can be reconnected by: VIDEO VISIT: Text to cell phone:   Telephone Information:   Mobile [phone]       Will anyone else be joining the visit? NO  (If patient encounters technical issues they should call [phone]    Are changes needed to the allergy or medication list? No    Are refills needed on medications prescribed by this physician? NO    Reason for visit: Medication Therapy Management      Monica Nowak, Virtual Visit Facilitator    QNR Status: NA

## 2025-06-18 NOTE — PATIENT INSTRUCTIONS
"Recommendations from MTM Pharmacist visit:                                                    MTM (medication therapy management) is a service provided by a clinical pharmacist designed to help you get the most of out of your medicines.  You may be sent a phone or email survey evaluating today's visit.  Please provide feedback you have for the service he received today if you are able.    Increase Zepbound to 7.5 mg weekly.  Sent to   Pharmacy    [id number] - [place], [place] - [address] AT [facility]       Helpful Nutritional/Eating Behavior Recommendations When On Injectable GLP-1 Agonist  Water intake: Hydrate, hydrate, hydrate!!   64-96 oz water daily (this does not include coffee/caffeine containing beverages)  Ensure adequate protein intake   This is to ensure your body has the building blocks necessary for adequate muscle maintenance and decrease likelihood of severity of muscle loss  Ensure getting in protein with each meal -   Protein can come from come from meat, dairy products, nuts, some vegetables, and certain grains and beans  Examples of high quality or \"complete\" proteins: chicken, turkey, lean ground beef, salmon, tuna, certain beans and lentils  Ensure adequate fiber intake   At least 21 grams per day for women   At least 30 grams per day for men   A food that has 3 or more gram fiber is considered a \"good fiber choice\"   Consider starting multivitamin containing calcium and vitamin D if food intake more limited   Examples: Centrum, Nature Made, One-A-Day  To minimize side effects:   Smaller more frequent meals   Do not skip meals  Avoid foods that may worsen acid reflux, heartburn, bowel movements (fatty, rich, greasy, or acidic foods)   Stop eating when feeling satiated (~80% full)   Ensure having consistent bowel movements (every day or every 2 days at minimum)  If you are having straining, hard stools or having bowel movement every 3rd day or less, " "reach out to the care team     Follow-up:   Appointments in Next Year      Jul 23, 2025 2:30 PM  (Arrive by 2:10 PM)  Preventative Adult Visit with Nestor Loya MD  [facility] ([facility] ) [phone]     Aug 12, 2025 11:30 AM  (Arrive by 11:20 AM)  Return Weight Management Visit with Justina Nascimento PA-C  [facility] [place] ([facility] ) [phone]     Oct 13, 2025 1:00 PM  Pharmacist Visit with Josephine Davis RPH  [facility] ([facility] ) [phone]            It was great speaking with you today.  I value your experience and would be very thankful for your time in providing feedback in our clinic survey. In the next few days, you may receive an email or text message from [employer] with a link to a survey related to your  clinical pharmacist.\"     To schedule another MTM appointment, please call the clinic directly ([facility] Phone Number: [phone] (schedules for [facility] and [facility] - providers, dietitians, health coaches) or you may call the MTM scheduling line at [phone] or toll-free at [phone].     My Clinical Pharmacist's contact information:                                                      Please feel free to contact me with any questions or concerns you have.      Josephine Davis, Pharm D., MPH    Medication Therapy Management Pharmacist   [facility]      Try doing the \"Healthy Eating Plate\" method at home by following these rules: Start with a 9-inch dinner plate.         Meal Replacement Shake Options:   *Protein Shake Criteria: no more than 210 Calories, at least 20 grams of protein, and less than 10 grams of sugar   Premier Protein (160 Calories, 30 g protein)  Slim Fast Advanced Nutrition (180 Calories, 20 g " protein)  Muscle Milk, lactose-free, 17 oz bottle (210 Calories, 30 g protein)  Integrated Supplements, no artificial sugars (110 Calories, 20 g protein)  Boost/Ensure Max (160 calories, 30 gm protein)   Fairlife Protein Shakes (160-230 calories, 26-42 gm protein)  Aldi's Elevation Protein Powder (180 calories, 30 gm protein)   Orgain Protein Shakes (130-160 calories, 20-26 gm protein)     Meal Replacement Bar Options:  Quest Protein Bars (190 Calories, 20 g protein)  Built Bar (170 Calories, 15-20 g protein)  One Protein Bar (210 calories, 20 g protein)  [place] Signature Protein Bar ([employer]) (190 Calories, 21 g protein)  Pure Protein Bars (180 Calories, 21 g protein)    Low Calorie Frozen Meal:  Healthy Choice Power Bowls  Lean Cuisine  Smart Ones  William Ames      ---------------------------------------------------------------  Tips to Increase the Protein in Your Diet  You may need more protein in your diet to help you heal from an illness, surgery or wound. Extra protein can also help you gain weight. Here are some ideas for adding high-protein foods to your meals.  Meat and fish  Add chopped cooked meat to vegetables, salads, casseroles, soups, sauces and biscuit dough.  Use in omelets, soufflés, quiches, sandwich fillings and chicken or turkey stuffing.  Wrap in pie crust or biscuit dough to make a turnover.  Add to stuffed baked potatoes.  Make a dip with diced meat or flaked fish mixed with sour cream and spices.  Chopped, hard-cooked eggs  Add to salads.  Use for snacks and sandwich filling.  High-protein milk  To make high-protein milk, mix 1 quart whole milk with 1 cup powdered milk.  Add to cream soups, mashed potatoes, scrambled eggs, cereals and dried eggnog mix.  Use as an ingredient in puddings, custards, hot chocolate, milk shakes and pancakes.  Powdered milk  If you don't have any high-protein milk on hand, you can use powdered milk. Add 3 tablespoons to:  gravies, sauces, cream soups,  mayonnaise  casseroles, meat patties, meatloaf, tuna salad, deviled ham  scalloped or mashed potatoes, creamed spinach  scrambled eggs, egg salad  cereals  yogurt, milk drinks, ice cream, frozen desserts, puddings, custards.  Add 4 to 6 tablespoons powdered milk to make:  cream sauces  breads, muffins, pancakes, waffles, cookies, cakes  cream pies, frostings, cake fillings  fruit cobblers, bread or rice pudding, gelatin desserts.  For high-protein eggnog, add 3 to 6 tablespoons powdered milk to prepared eggnog.  Hard or soft cheese  Melt on sandwiches, breads, tortillas, hamburgers, hot dogs, other meats, vegetables, eggs and pies.  Grate into soups, chili, sauces, casseroles, vegetables, potatoes, rice, noodles or meatloaf.  Eat with toast or crackers, or melt for cindy dip.  Cottage cheese or ricotta cheese  Mix with or scoop on top of fruits and vegetables.  Add to casseroles, lasagna, spaghetti, noodles and egg dishes (omelets, scrambled eggs, soufflés).  Use in gelatin, pudding-type desserts, cheesecake and pancake batter.  Use to stuff crepes, pasta shells or manicotti.  Fruit yogurt  Blend with fruits for a fruit smoothie.  Use as a dip for fruits and vegetables.  Scoop on top of pancakes or waffles.  Tofu  Blend silken tofu with fruits and juices for a smoothie.  Add chunks of firm tofu to soups and stews, or crumble into meatloaf.  Blend dried onion soup mix into soft or silken tofu for dip.  Use pureed silken tofu for part of the mayonnaise, sour cream, cream cheese or ricotta cheese called for in recipes.  Beans  Use cooked beans or peas in soups, casseroles, pasta, tacos and burritos.  Nuts and seeds  Note: For children under 3, discuss with the child's care team.  Use in casseroles, breads, muffins, pancakes, cookies and waffles.  Sprinkle on fruits, cereals, ice cream, yogurt, vegetables and salads.  Mix with raisins, dried fruits and chocolate chips for a snack.  Nut butters  Note: For children under  3, discuss with the child's care team.  Spread on sandwiches, toast, muffins, crackers, waffles, pancakes and fruit slices.  Use as a dip for raw vegetables.  Blend with milk drinks, or swirl through ice cream, yogurt or hot cereal.  Nutrition supplements (nutrition bars, drinks and powders)  Add powders to milk drinks and desserts.  Mix with ice cream, milk and fruit for a high-protein milk shake.    For informational purposes only. Not to replace the advice of your health care provider. Clinically reviewed by Sondra Thompson, TENZIN, LD, and the Clinical Nutrition Service Line. Copyright   2005 [facility]. All rights reserved. [employer] 881164 - REV 04/24.      -----------------------------------------------------------------------------------------------------------------  Learning About High-Protein Foods  What foods are high in protein?     The foods you eat contain nutrients, such as vitamins and minerals. Protein is a nutrient. Your body needs the right amount to stay healthy and work as it should. You can use the list below to help you make choices about which foods to eat.  Here are some examples of foods that are high in protein.  Dairy and dairy alternatives  Cheese  Milk  Soy milk  Yogurt (especially Greek)  Meat  Beef  Chicken  Ham  Lamb  Lunch meat  Pork  Sausage  Turkey  Other protein foods  Hemp seeds!  Beans (black, garbanzo, kidney, lima)  Eggs  Hummus  Lentils  Nuts  Peanut butter and other nut butters  Peas  Soybeans  Tofu  Veggie or soy liborio (Check the nutrition label for the amount of protein in each serving.)  Seafood  Anchovies  Cod  Crab  Halibut  Belcher  Sardines  Shrimp  Tilapia  [place]  Tuna  Protein supplements  Bars (Check the nutrition label for the amount of protein in each serving.)  Drinks  Powders  Work with your doctor to find out how much of this nutrient you need. Depending on your health, you may need more or less of it in your diet.  Where can you learn  "more?  Go to https://www.healthZymer[employer].net/patiented  Enter P335 in the search box to learn more about \"Learning About High-Protein Foods.\"  Current as of: September 20, 2023               Content Version: 14.0    0248-6441 [employer]wise, [employer].   Care instructions adapted under license by your healthcare professional. If you have questions about a medical condition or this instruction, always ask your healthcare professional. Healthwise, [employer] disclaims any warranty or liability for your use of this information.         "

## 2025-06-18 NOTE — PROGRESS NOTES
"Medication Therapy Management (MTM) Encounter    ASSESSMENT:                            Medication Adherence/Access: No issues identified.    Weight management :    Given tolerating well and below weight loss goal of 5%, agree with dose increase Zepbound. Discussed dietary and behavioral modifications for medication safety and efficacy.       PLAN:                            Increase Zepbound to 7.5 mg weekly.    Helpful Nutritional/Eating Behavior Recommendations When On Injectable GLP-1 Agonist  Water intake: Hydrate, hydrate, hydrate!!   64-96 oz water daily (this does not include coffee/caffeine containing beverages)  Ensure adequate protein intake   This is to ensure your body has the building blocks necessary for adequate muscle maintenance and decrease likelihood of severity of muscle loss  Ensure getting in protein with each meal -   Protein can come from come from meat, dairy products, nuts, some vegetables, and certain grains and beans  Examples of high quality or \"complete\" proteins: chicken, turkey, lean ground beef, salmon, tuna, certain beans and lentils  Ensure adequate fiber intake   At least 21 grams per day for women   At least 30 grams per day for men   A food that has 3 or more gram fiber is considered a \"good fiber choice\"   Consider starting multivitamin containing calcium and vitamin D if food intake more limited   Examples: Centrum, Nature Made, One-A-Day  To minimize side effects:   Smaller more frequent meals   Do not skip meals  Avoid foods that may worsen acid reflux, heartburn, bowel movements (fatty, rich, greasy, or acidic foods)   Stop eating when feeling satiated (~80% full)   Ensure having consistent bowel movements (every day or every 2 days at minimum)  If you are having straining, hard stools or having bowel movement every 3rd day or less, reach out to the care team       Follow-up:   Appointments in Next Year      Jul 23, 2025 2:30 PM  (Arrive by 2:10 PM)  Preventative Adult " Visit with Nestor Loya MD  [facility] ([facility] ) [phone]     Aug 12, 2025 11:30 AM  (Arrive by 11:20 AM)  Return Weight Management Visit with Justina Nascimento PA-C  [facility] [place] ([facility] ) [phone]     Oct 13, 2025 1:00 PM  Pharmacist Visit with Josephine Davis RPH  [facility] MT ([facility] ) [phone]            SUBJECTIVE/OBJECTIVE:                          Tiffany Guevara is a 45 year old female seen for a follow-up visit.       Reason for visit: Medication Therapy Management - weight management .    Allergies/ADRs: Reviewed in chart  Past Medical History: Reviewed in chart  Tobacco: She reports that she has never smoked. She has never used smokeless tobacco.  Alcohol: not currently using    Medication Adherence/Access: no issues reported.    Weight Management   Zepbound 5 mg weekly x 8 weeks    12/18/24 Return Medical Weight Management Visit with Justina Nascmiento PA-C; next visit 8/12/25    Patient reports no side effects. Feels ready to increase dose given feels that current dose is plateauing on weight loss. Appetite is somewhat more difficult to manage during menses and is going through perimenopause too so feels that time to increase Zepbound. Feels clothing is looser so this feels good too.    Nutrition/Eating Habits: working on increasing protein (fish, chicken, beef) - this feels good and satiated with this. Working to add fruits, veggies, whole grains. Eating more red meat given low Fe. Eating 3-4 meals per day - eats when hungry.    Increasing water - goal to get 64 oz per day (3-4 x 16 oz bottles per day).      Exercise/Activity: active at work - working to balance being active at home with caring for family which can be a difficult balance..     Medications Tried/Failed/Considerations:  Wegovy: could not  "tolerate - severe nausea, not able to teat consistently    Per visit notes with Justina Nascimento PA-C with [facility]:    Phentermine:   Tried and hair loss  GLP-1:        Denies Medullary Thyroid Ca (incl Fhx), MEN type II, denies pt was pancreatitis Hx, gallbladder problems or gastroparesis.       Initial Consult Weight: 310 lbs (up to 317 lbs since starting with [facility])           Current weight today: 300 lbs 0 oz  Cumulative Weight Loss: -10 lb, -3.2% from baseline    Wt Readings from Last 4 Encounters:   06/18/25 300 lb (136.1 kg)   05/01/25 (!) 310 lb (140.6 kg)   03/10/25 (!) 310 lb 12.8 oz (141 kg)   02/26/25 (!) 310 lb (140.6 kg)     Estimated body mass index is 49.92 kg/m  as calculated from the following:    Height as of 12/18/24: 5' 5\" (1.651 m).    Weight as of this encounter: 300 lb (136.1 kg).        Today's Vitals: Wt 300 lb (136.1 kg)   BMI 49.92 kg/m    ----------------      I spent 17 minutes with this patient today. All changes were made via collaborative practice agreement with Justina Nascimento.     A summary of these recommendations was sent via [employer].    Josephine Davis, Pharm D., MPH    Medication Therapy Management Pharmacist   [facility]     Telemedicine Visit Details  The patient's medications can be safely assessed via a telemedicine encounter.  Type of service:  Telephone visit  Originating Location (pt. Location): Home    Distant Location (provider location):  Off-site  Start Time: 1:10 PM  End Time: 1:27 PM     Medication Therapy Recommendations  Morbid obesity with BMI of 50.0-59.9, adult (H)   1 Current Medication: tirzepatide-Weight Management (ZEPBOUND) 5 MG/0.5ML prefilled pen (Discontinued)   Current Medication Sig: Inject 0.5 mLs (5 mg) subcutaneously every 7 days.   Rationale: Dose too low - Dosage too low - Effectiveness   Recommendation: Increase Dose   Status: Accepted per CPA   Identified " Date: 6/18/2025 Completed Date: 6/18/2025

## 2025-06-18 NOTE — Clinical Note
Doing GREAT on Zepbound!! Increased to Zepbound 7.5mg today. Seeing you in August. Me in Oct.  Josephine

## 2025-07-27 ENCOUNTER — HEALTH MAINTENANCE LETTER (OUTPATIENT)
Age: 45
End: 2025-07-27

## 2025-08-12 ENCOUNTER — VIRTUAL VISIT (OUTPATIENT)
Dept: SURGERY | Facility: CLINIC | Age: 45
End: 2025-08-12
Payer: COMMERCIAL

## 2025-08-12 VITALS — HEIGHT: 65 IN | BODY MASS INDEX: 49.92 KG/M2

## 2025-08-12 DIAGNOSIS — E78.5 HYPERLIPIDEMIA, UNSPECIFIED HYPERLIPIDEMIA TYPE: ICD-10-CM

## 2025-08-12 DIAGNOSIS — I10 ESSENTIAL HYPERTENSION: ICD-10-CM

## 2025-08-12 DIAGNOSIS — E66.813 CLASS 3 SEVERE OBESITY WITH BODY MASS INDEX (BMI) OF 45.0 TO 49.9 IN ADULT (H): Primary | ICD-10-CM

## 2025-08-12 DIAGNOSIS — R73.03 PREDIABETES: ICD-10-CM

## 2025-08-12 PROCEDURE — 98006 SYNCH AUDIO-VIDEO EST MOD 30: CPT | Performed by: PHYSICIAN ASSISTANT

## 2025-09-02 DIAGNOSIS — I10 ESSENTIAL HYPERTENSION: ICD-10-CM

## 2025-09-02 RX ORDER — HYDROCHLOROTHIAZIDE 25 MG/1
25 TABLET ORAL DAILY
Qty: 90 TABLET | Refills: 0 | Status: SHIPPED | OUTPATIENT
Start: 2025-09-02